# Patient Record
Sex: MALE | Race: WHITE | NOT HISPANIC OR LATINO | URBAN - METROPOLITAN AREA
[De-identification: names, ages, dates, MRNs, and addresses within clinical notes are randomized per-mention and may not be internally consistent; named-entity substitution may affect disease eponyms.]

---

## 2018-02-12 ENCOUNTER — AMBULATORY SURGICAL CENTER (OUTPATIENT)
Dept: URBAN - METROPOLITAN AREA SURGERY 20 | Facility: SURGERY | Age: 68
End: 2018-02-12

## 2018-02-12 ENCOUNTER — OFFICE (OUTPATIENT)
Dept: URBAN - METROPOLITAN AREA PATHOLOGY 4 | Facility: PATHOLOGY | Age: 68
End: 2018-02-12

## 2018-02-12 DIAGNOSIS — D12.2 BENIGN NEOPLASM OF ASCENDING COLON: ICD-10-CM

## 2018-02-12 DIAGNOSIS — D12.0 BENIGN NEOPLASM OF CECUM: ICD-10-CM

## 2018-02-12 DIAGNOSIS — K64.0 FIRST DEGREE HEMORRHOIDS: ICD-10-CM

## 2018-02-12 DIAGNOSIS — D12.6 BENIGN NEOPLASM OF COLON, UNSPECIFIED: ICD-10-CM

## 2018-02-12 DIAGNOSIS — Z86.010 PERSONAL HISTORY OF COLONIC POLYPS: ICD-10-CM

## 2018-02-12 LAB
GI HISTOLOGY: A. UNSPECIFIED: (no result)
GI HISTOLOGY: B. SELECT: (no result)
GI HISTOLOGY: C. SELECT: (no result)
GI HISTOLOGY: PDF REPORT: (no result)

## 2018-02-12 PROCEDURE — 88305 TISSUE EXAM BY PATHOLOGIST: CPT | Performed by: INTERNAL MEDICINE

## 2018-02-12 PROCEDURE — 45380 COLONOSCOPY AND BIOPSY: CPT | Mod: 59 | Performed by: INTERNAL MEDICINE

## 2018-02-12 PROCEDURE — 45385 COLONOSCOPY W/LESION REMOVAL: CPT | Performed by: INTERNAL MEDICINE

## 2021-02-23 ENCOUNTER — TRANSCRIBE ORDERS (OUTPATIENT)
Dept: SLEEP MEDICINE | Facility: HOSPITAL | Age: 71
End: 2021-02-23

## 2021-02-23 DIAGNOSIS — Z01.818 OTHER SPECIFIED PRE-OPERATIVE EXAMINATION: Primary | ICD-10-CM

## 2021-03-03 ENCOUNTER — LAB (OUTPATIENT)
Dept: LAB | Facility: HOSPITAL | Age: 71
End: 2021-03-03

## 2021-03-03 DIAGNOSIS — Z01.818 OTHER SPECIFIED PRE-OPERATIVE EXAMINATION: ICD-10-CM

## 2021-03-03 PROCEDURE — C9803 HOPD COVID-19 SPEC COLLECT: HCPCS

## 2021-03-03 PROCEDURE — U0005 INFEC AGEN DETEC AMPLI PROBE: HCPCS

## 2021-03-03 PROCEDURE — U0004 COV-19 TEST NON-CDC HGH THRU: HCPCS

## 2021-03-04 LAB — SARS-COV-2 RNA RESP QL NAA+PROBE: NOT DETECTED

## 2021-03-05 ENCOUNTER — ON CAMPUS - OUTPATIENT (OUTPATIENT)
Dept: URBAN - METROPOLITAN AREA HOSPITAL 114 | Facility: HOSPITAL | Age: 71
End: 2021-03-05

## 2021-03-05 ENCOUNTER — ANESTHESIA (OUTPATIENT)
Dept: GASTROENTEROLOGY | Facility: HOSPITAL | Age: 71
End: 2021-03-05

## 2021-03-05 ENCOUNTER — HOSPITAL ENCOUNTER (OUTPATIENT)
Facility: HOSPITAL | Age: 71
Setting detail: HOSPITAL OUTPATIENT SURGERY
Discharge: HOME | End: 2021-03-05
Attending: INTERNAL MEDICINE | Admitting: INTERNAL MEDICINE

## 2021-03-05 ENCOUNTER — ANESTHESIA EVENT (OUTPATIENT)
Dept: GASTROENTEROLOGY | Facility: HOSPITAL | Age: 71
End: 2021-03-05

## 2021-03-05 VITALS
HEIGHT: 69 IN | BODY MASS INDEX: 27.99 KG/M2 | RESPIRATION RATE: 16 BRPM | OXYGEN SATURATION: 97 % | HEART RATE: 73 BPM | WEIGHT: 189 LBS | DIASTOLIC BLOOD PRESSURE: 67 MMHG | SYSTOLIC BLOOD PRESSURE: 114 MMHG

## 2021-03-05 DIAGNOSIS — Z86.010 HISTORY OF COLONIC POLYPS: ICD-10-CM

## 2021-03-05 DIAGNOSIS — D12.3 BENIGN NEOPLASM OF TRANSVERSE COLON: ICD-10-CM

## 2021-03-05 DIAGNOSIS — K57.30 DIVERTICULOSIS OF LARGE INTESTINE WITHOUT PERFORATION OR ABS: ICD-10-CM

## 2021-03-05 DIAGNOSIS — K64.0 FIRST DEGREE HEMORRHOIDS: ICD-10-CM

## 2021-03-05 DIAGNOSIS — Z86.010 PERSONAL HISTORY OF COLONIC POLYPS: ICD-10-CM

## 2021-03-05 DIAGNOSIS — D12.2 BENIGN NEOPLASM OF ASCENDING COLON: ICD-10-CM

## 2021-03-05 PROCEDURE — 45385 COLONOSCOPY W/LESION REMOVAL: CPT | Mod: PT | Performed by: INTERNAL MEDICINE

## 2021-03-05 PROCEDURE — 25010000002 PROPOFOL 10 MG/ML EMULSION: Performed by: ANESTHESIOLOGY

## 2021-03-05 PROCEDURE — 45381 COLONOSCOPY SUBMUCOUS NJX: CPT | Mod: PT,59 | Performed by: INTERNAL MEDICINE

## 2021-03-05 PROCEDURE — 45381 COLONOSCOPY SUBMUCOUS NJX: CPT | Mod: 59,PT | Performed by: INTERNAL MEDICINE

## 2021-03-05 PROCEDURE — 88305 TISSUE EXAM BY PATHOLOGIST: CPT | Performed by: INTERNAL MEDICINE

## 2021-03-05 RX ORDER — ALLOPURINOL 100 MG/1
100 TABLET ORAL DAILY
COMMUNITY

## 2021-03-05 RX ORDER — OMEGA-3S/DHA/EPA/FISH OIL/D3 300MG-1000
400 CAPSULE ORAL DAILY
COMMUNITY
End: 2022-11-11

## 2021-03-05 RX ORDER — LIDOCAINE HYDROCHLORIDE 20 MG/ML
INJECTION, SOLUTION INFILTRATION; PERINEURAL AS NEEDED
Status: DISCONTINUED | OUTPATIENT
Start: 2021-03-05 | End: 2021-03-05 | Stop reason: SURG

## 2021-03-05 RX ORDER — AMLODIPINE BESYLATE 5 MG/1
5 TABLET ORAL DAILY
COMMUNITY

## 2021-03-05 RX ORDER — PROPOFOL 10 MG/ML
VIAL (ML) INTRAVENOUS AS NEEDED
Status: DISCONTINUED | OUTPATIENT
Start: 2021-03-05 | End: 2021-03-05 | Stop reason: SURG

## 2021-03-05 RX ORDER — LOSARTAN POTASSIUM 50 MG/1
100 TABLET ORAL DAILY
COMMUNITY
End: 2022-11-11

## 2021-03-05 RX ORDER — TRAVOPROST OPHTHALMIC SOLUTION 0.04 MG/ML
1 SOLUTION OPHTHALMIC EVERY EVENING
COMMUNITY
End: 2023-02-17

## 2021-03-05 RX ORDER — SODIUM CHLORIDE 0.9 % (FLUSH) 0.9 %
10 SYRINGE (ML) INJECTION AS NEEDED
Status: DISCONTINUED | OUTPATIENT
Start: 2021-03-05 | End: 2021-03-05 | Stop reason: HOSPADM

## 2021-03-05 RX ORDER — SODIUM CHLORIDE 0.9 % (FLUSH) 0.9 %
3 SYRINGE (ML) INJECTION EVERY 12 HOURS SCHEDULED
Status: DISCONTINUED | OUTPATIENT
Start: 2021-03-05 | End: 2021-03-05 | Stop reason: HOSPADM

## 2021-03-05 RX ORDER — ATORVASTATIN CALCIUM 20 MG/1
20 TABLET, FILM COATED ORAL DAILY
COMMUNITY

## 2021-03-05 RX ORDER — HYDROCODONE BITARTRATE AND ACETAMINOPHEN 7.5; 325 MG/1; MG/1
1 TABLET ORAL EVERY 6 HOURS PRN
COMMUNITY
End: 2022-08-26 | Stop reason: SDUPTHER

## 2021-03-05 RX ORDER — SODIUM CHLORIDE, SODIUM LACTATE, POTASSIUM CHLORIDE, CALCIUM CHLORIDE 600; 310; 30; 20 MG/100ML; MG/100ML; MG/100ML; MG/100ML
30 INJECTION, SOLUTION INTRAVENOUS CONTINUOUS PRN
Status: DISCONTINUED | OUTPATIENT
Start: 2021-03-05 | End: 2021-03-05 | Stop reason: HOSPADM

## 2021-03-05 RX ORDER — PROPOFOL 10 MG/ML
VIAL (ML) INTRAVENOUS CONTINUOUS PRN
Status: DISCONTINUED | OUTPATIENT
Start: 2021-03-05 | End: 2021-03-05 | Stop reason: SURG

## 2021-03-05 RX ADMIN — PROPOFOL 100 MG: 10 INJECTION, EMULSION INTRAVENOUS at 07:57

## 2021-03-05 RX ADMIN — PROPOFOL 100 MG: 10 INJECTION, EMULSION INTRAVENOUS at 08:28

## 2021-03-05 RX ADMIN — PROPOFOL 100 MCG/KG/MIN: 10 INJECTION, EMULSION INTRAVENOUS at 07:57

## 2021-03-05 RX ADMIN — SODIUM CHLORIDE, POTASSIUM CHLORIDE, SODIUM LACTATE AND CALCIUM CHLORIDE 30 ML/HR: 600; 310; 30; 20 INJECTION, SOLUTION INTRAVENOUS at 07:44

## 2021-03-05 RX ADMIN — LIDOCAINE HYDROCHLORIDE 60 MG: 20 INJECTION, SOLUTION INFILTRATION; PERINEURAL at 07:58

## 2021-03-05 NOTE — H&P
[facility]   HISTORY AND PHYSICAL    Patient Name: Larry Kwon  : 1950  MRN: [id number]  Primary Care Physician: Jf Lassiter MD  Date of admission: 3/5/2021    Subjective   Subjective     Chief Complaint: Personal history of colon polyps     History of Present Illness  The patient presents with no gastrointestinal  Symptoms or issues at this time   Review of Systems   All other systems reviewed and are negative.       Personal History     Past Medical History:   Diagnosis Date   • Elevated cholesterol    • Gout    • Hypertension        Past Surgical History:   Procedure Laterality Date   • APPENDECTOMY     • BACK SURGERY      x3   • COLONOSCOPY     • HERNIA REPAIR         Family History: family history is not on file. Otherwise pertinent FHx was reviewed and not pertinent to current issue.    Social History:  reports that he has never smoked. He has never used smokeless tobacco. He reports current alcohol use. He reports previous drug use.    Home Medications:  HYDROcodone-acetaminophen, allopurinol, amLODIPine, atorvastatin, cholecalciferol, losartan, and travoprost (ANA free)      Allergies:  No Known Allergies    Objective    Objective     Vitals:  Heart Rate:  [73] 73  Resp:  [16] 16  BP: (136)/(87) 136/87    Physical Exam  HENT:      Right Ear: External ear normal.      Left Ear: External ear normal.      Mouth/Throat:      Pharynx: Oropharynx is clear.   Eyes:      Conjunctiva/sclera: Conjunctivae normal.   Cardiovascular:      Rate and Rhythm: Normal rate.   Pulmonary:      Effort: Pulmonary effort is normal.   Abdominal:      General: Abdomen is flat.   Neurological:      General: No focal deficit present.      Mental Status: He is alert.   Psychiatric:         Mood and Affect: Mood normal.         Result Review    Result Review:  I have personally reviewed the results from the time of this admission to 21 7:55 AM EST and agree with these findings:  []  Laboratory  []   Microbiology  []  Radiology  []  EKG/Telemetry   []  Cardiology/Vascular   [x]  Pathology  [x]  Old records  []  Other:      Assessment/Plan   Assessment / Plan     Brief Patient Summary:  Larry Kwon is a 70 y.o. male who has Personal history of colon polyps   Active Hospital Problems:  There are no active hospital problems to display for this patient.      Plan: Colonoscopy risks, alternatives and benefits discussed with patient and the patient is agreeable to having procedure done.      CODE STATUS:    There are no questions and answers to display.       Electronically signed by Pavan Sauceda MD, 03/05/21, 7:55 AM EST.

## 2021-03-05 NOTE — DISCHARGE INSTRUCTIONS
For the next 24 hours patient needs to be with a responsible adult.    For 24 hours DO NOT drive, operate machinery, appliances, drink alcohol, make important decisions or sign legal documents.    Start with a light or bland diet if you are feeling sick to your stomach otherwise advance to regular diet as tolerated.    Follow recommendations on procedure report if provided by your doctor.    Call Dr. Sauceda for problems [phone]    Problems may include but not limited to: large amounts of bleeding, trouble breathing, repeated vomiting, severe unrelieved pain, fever or chills.

## 2021-03-05 NOTE — ANESTHESIA POSTPROCEDURE EVALUATION
"Patient: Larry Kwon    Procedure Summary     Date: 03/05/21 Room / Location:  [facility]    Anesthesia Start: 0753 Anesthesia Stop: 0845    Procedure: COLONOSCOPY TO CECUM AND TERM ILEUM WITH SALINE LIFT AND HOT SNARE POLYPECTOMIES (N/A ) Diagnosis:     Surgeon: Pavan Sauceda MD Provider: Jayjay Perez MD    Anesthesia Type: MAC ASA Status: 2          Anesthesia Type: MAC    Vitals  Vitals Value Taken Time   /67 03/05/21 0905   Temp     Pulse 73 03/05/21 0905   Resp 16 03/05/21 0905   SpO2 97 % 03/05/21 0905           Post Anesthesia Care and Evaluation    Patient location during evaluation: PACU  Patient participation: complete - patient participated  Level of consciousness: awake  Pain score: 0  Pain management: adequate  Airway patency: patent  Anesthetic complications: No anesthetic complications  PONV Status: none  Cardiovascular status: acceptable  Respiratory status: acceptable  Hydration status: acceptable    Comments: /67   Pulse 73   Resp 16   Ht 175.3 cm (69\")   Wt 85.7 kg (189 lb)   SpO2 97%   BMI 27.91 kg/m²       "

## 2021-03-05 NOTE — ANESTHESIA PREPROCEDURE EVALUATION
Anesthesia Evaluation     Patient summary reviewed and Nursing notes reviewed   NPO Solid Status: > 8 hours  NPO Liquid Status: > 2 hours           Airway   Mallampati: I  TM distance: >3 FB  Neck ROM: full  No difficulty expected  Dental - normal exam     Pulmonary - negative pulmonary ROS and normal exam   Cardiovascular - normal exam    (+) hypertension, hyperlipidemia,     ROS comment: rbbb    Neuro/Psych- negative ROS  GI/Hepatic/Renal/Endo - negative ROS     Musculoskeletal (-) negative ROS    Abdominal  - normal exam    Bowel sounds: normal.   Substance History - negative use     OB/GYN negative ob/gyn ROS         Other                      Anesthesia Plan    ASA 2     MAC       Anesthetic plan, all risks, benefits, and alternatives have been provided, discussed and informed consent has been obtained with: patient.

## 2021-03-08 LAB
LAB AP CASE REPORT: NORMAL
PATH REPORT.FINAL DX SPEC: NORMAL
PATH REPORT.GROSS SPEC: NORMAL

## 2022-08-05 ENCOUNTER — OFFICE VISIT (OUTPATIENT)
Dept: FAMILY MEDICINE CLINIC | Facility: CLINIC | Age: 72
End: 2022-08-05

## 2022-08-05 VITALS
DIASTOLIC BLOOD PRESSURE: 84 MMHG | HEART RATE: 76 BPM | SYSTOLIC BLOOD PRESSURE: 128 MMHG | OXYGEN SATURATION: 98 % | RESPIRATION RATE: 12 BRPM | BODY MASS INDEX: 28.85 KG/M2 | HEIGHT: 68 IN | TEMPERATURE: 98.4 F | WEIGHT: 190.4 LBS

## 2022-08-05 DIAGNOSIS — M54.41 CHRONIC RIGHT-SIDED LOW BACK PAIN WITH RIGHT-SIDED SCIATICA: ICD-10-CM

## 2022-08-05 DIAGNOSIS — I10 PRIMARY HYPERTENSION: ICD-10-CM

## 2022-08-05 DIAGNOSIS — M54.42 CHRONIC BILATERAL LOW BACK PAIN WITH LEFT-SIDED SCIATICA: ICD-10-CM

## 2022-08-05 DIAGNOSIS — E79.0 HYPERURICEMIA: ICD-10-CM

## 2022-08-05 DIAGNOSIS — E55.9 VITAMIN D INSUFFICIENCY: ICD-10-CM

## 2022-08-05 DIAGNOSIS — Z00.00 ROUTINE GENERAL MEDICAL EXAMINATION AT A HEALTH CARE FACILITY: Primary | ICD-10-CM

## 2022-08-05 DIAGNOSIS — G89.29 CHRONIC BILATERAL LOW BACK PAIN WITH LEFT-SIDED SCIATICA: ICD-10-CM

## 2022-08-05 DIAGNOSIS — E78.2 MIXED HYPERLIPIDEMIA: ICD-10-CM

## 2022-08-05 DIAGNOSIS — M25.561 CHRONIC PAIN OF RIGHT KNEE: ICD-10-CM

## 2022-08-05 DIAGNOSIS — G89.29 CHRONIC RIGHT-SIDED LOW BACK PAIN WITH RIGHT-SIDED SCIATICA: ICD-10-CM

## 2022-08-05 DIAGNOSIS — E79.0 HYPERURICEMIA WITHOUT SIGNS OF INFLAMMATORY ARTHRITIS AND TOPHACEOUS DISEASE: ICD-10-CM

## 2022-08-05 DIAGNOSIS — G89.29 CHRONIC PAIN OF RIGHT KNEE: ICD-10-CM

## 2022-08-05 PROBLEM — M47.812 ARTHROPATHY OF CERVICAL SPINE: Status: ACTIVE | Noted: 2022-08-05

## 2022-08-05 PROBLEM — N40.0 BPH (BENIGN PROSTATIC HYPERPLASIA): Status: ACTIVE | Noted: 2022-08-05

## 2022-08-05 PROBLEM — R45.4 IRRITABILITY: Status: ACTIVE | Noted: 2022-08-05

## 2022-08-05 PROBLEM — H40.89: Status: ACTIVE | Noted: 2022-08-05

## 2022-08-05 PROBLEM — K57.90 DIVERTICULOSIS: Status: ACTIVE | Noted: 2022-08-05

## 2022-08-05 PROBLEM — G57.12 MERALGIA PARESTHETICA OF LEFT SIDE: Status: ACTIVE | Noted: 2022-08-05

## 2022-08-05 PROBLEM — I45.10 RBBB: Status: ACTIVE | Noted: 2022-08-05

## 2022-08-05 PROCEDURE — 90715 TDAP VACCINE 7 YRS/> IM: CPT | Performed by: INTERNAL MEDICINE

## 2022-08-05 PROCEDURE — 90471 IMMUNIZATION ADMIN: CPT | Performed by: INTERNAL MEDICINE

## 2022-08-05 PROCEDURE — 36415 COLL VENOUS BLD VENIPUNCTURE: CPT | Performed by: INTERNAL MEDICINE

## 2022-08-05 PROCEDURE — 1170F FXNL STATUS ASSESSED: CPT | Performed by: INTERNAL MEDICINE

## 2022-08-05 PROCEDURE — 93000 ELECTROCARDIOGRAM COMPLETE: CPT | Performed by: INTERNAL MEDICINE

## 2022-08-05 PROCEDURE — 1160F RVW MEDS BY RX/DR IN RCRD: CPT | Performed by: INTERNAL MEDICINE

## 2022-08-05 PROCEDURE — G0439 PPPS, SUBSEQ VISIT: HCPCS | Performed by: INTERNAL MEDICINE

## 2022-08-05 RX ORDER — LOSARTAN POTASSIUM 100 MG/1
TABLET ORAL
COMMUNITY
Start: 2022-07-05

## 2022-08-05 RX ORDER — MELATONIN
COMMUNITY
Start: 2022-07-05

## 2022-08-05 RX ORDER — MONTELUKAST SODIUM 10 MG/1
TABLET ORAL
COMMUNITY
Start: 2022-06-07 | End: 2023-02-17

## 2022-08-05 RX ORDER — ATORVASTATIN CALCIUM 20 MG/1
20 TABLET, FILM COATED ORAL DAILY
COMMUNITY
End: 2022-11-11

## 2022-08-05 RX ORDER — LATANOPROST 50 UG/ML
SOLUTION/ DROPS OPHTHALMIC
COMMUNITY
Start: 2022-07-28

## 2022-08-05 RX ORDER — OMEGA-3S/DHA/EPA/FISH OIL/D3 300MG-1000
400 CAPSULE ORAL
COMMUNITY
End: 2022-11-11

## 2022-08-05 RX ORDER — ALLOPURINOL 100 MG/1
100 TABLET ORAL DAILY
COMMUNITY
End: 2022-11-11

## 2022-08-05 RX ORDER — TRAVOPROST OPHTHALMIC SOLUTION 0.04 MG/ML
1 SOLUTION OPHTHALMIC DAILY
COMMUNITY
End: 2022-11-11

## 2022-08-05 NOTE — PROGRESS NOTES
Male Physical Note      Date: 2022   Patient Name: Larry Kwon  : 1950   MRN: [id number]     Chief Complaint:    Chief Complaint   Patient presents with   • Annual Exam       History of Present Illness: Larry Kwon is a 72 y.o. male who is here today for their subsequent Medicare annual wellness visit and physical.  Patient has a history of chronic back pain with primarily left greater than right-sided sciatica, overall doing quite well of late, taking his regimen of Norco 7.5 mg prescribed twice daily, typically taken most days.  No side effects of medication and does get good relief in pain.  He always has had appropriate urine drug screens with no concern admits appropriate use.  Hypertension well controlled.  No cardiopulmonary complaints.  Has noted some right knee pain for the last 6 months, no swelling, no locking, just more uncomfortable at times.  Not a major problem however.  No history of related trauma.  He also noted that he had lifted some mulch several weeks ago and had some acute lower abdominal discomfort secondarily which has now resolved.  He just wanted to make sure he did not have a hernia.    No hospitalizations in the last 365 days.    No falls unsteadiness or concerns of falling the last 365 days, no functional limitations, no mood concerns    Patient has advanced care directive      Subjective      Review of Systems:   Review of Systems    Past Medical History, Social History, Family History and Care Team were all reviewed with patient and updated as appropriate.     Medications:     Current Outpatient Medications:   •  allopurinol (ZYLOPRIM) 100 MG tablet, Take 100 mg by mouth Daily., Disp: , Rfl:   •  amLODIPine (NORVASC) 5 MG tablet, Take 5 mg by mouth Daily., Disp: , Rfl:   •  atorvastatin (LIPITOR) 20 MG tablet, Take 20 mg by mouth Daily., Disp: , Rfl:   •  cholecalciferol (VITAMIN D3) 10 MCG (400 UNIT) tablet, Take 400 Units by mouth Daily., Disp: , Rfl:   •   HYDROcodone-acetaminophen (NORCO) 7.5-325 MG per tablet, Take 1 tablet by mouth Every 6 (Six) Hours As Needed for Moderate Pain ., Disp: , Rfl:   •  losartan (COZAAR) 50 MG tablet, Take 100 mg by mouth Daily., Disp: , Rfl:   •  travoprost, BAK free, (TRAVATAN) 0.004 % solution ophthalmic solution, 1 drop Every Evening. in affected eye(s), Disp: , Rfl:   •  allopurinol (ZYLOPRIM) 100 MG tablet, Take 100 mg by mouth Daily., Disp: , Rfl:   •  atorvastatin (LIPITOR) 20 MG tablet, Take 20 mg by mouth Daily., Disp: , Rfl:   •  cholecalciferol (VITAMIN D3) 10 MCG (400 UNIT) tablet, Take 400 Units by mouth., Disp: , Rfl:   •  cholecalciferol (VITAMIN D3) 25 MCG (1000 UT) tablet, , Disp: , Rfl:   •  latanoprost (XALATAN) 0.005 % ophthalmic solution, , Disp: , Rfl:   •  losartan (COZAAR) 100 MG tablet, , Disp: , Rfl:   •  montelukast (SINGULAIR) 10 MG tablet, , Disp: , Rfl:   •  travoprost, BAK free, (TRAVATAN) 0.004 % solution ophthalmic solution, Inject 1 drop into the eye Daily., Disp: , Rfl:     Allergies:   Allergies   Allergen Reactions   • Ace Inhibitors Cough       Immunization History   Administered Date(s) Administered   • COVID-19 (PFIZER) PURPLE CAP 03/07/2021, 03/28/2021, 10/20/2021   • Fluzone High Dose =>65 Years ([facility] ONLY) 10/02/2018, 10/03/2019, 10/06/2020, 10/29/2021   • Influenza, Unspecified 10/29/2021   • Pneumococcal Conjugate 13-Valent (PCV13) 06/21/2016   • Pneumococcal Polysaccharide (PPSV23) 03/24/2014, 11/25/2014   • Pneumococcal, Unspecified 11/25/2014   • Tdap 09/20/2012, 08/05/2022        Last Completed Colonoscopy          COLORECTAL CANCER SCREENING (COLONOSCOPY - Every 10 Years) Next due on 3/5/2031    03/05/2021  COLONOSCOPY    03/05/2021  Surgical Procedure: COLONOSCOPY                      CT for Smoker (Age 50-80, 20 pk yr): N/A    Hep C (Age 18-79 once): Pending today  HIV (Age 15-65 once): N/A  A1c: Pending today  Lipid panel: Pending today  The 10-year ASCVD risk score (Shree DC  " et al., 2013) is: 24.5%    Values used to calculate the score:      Age: 72 years      Sex: Male      Is Non- : No      Diabetic: No      Tobacco smoker: No      Systolic Blood Pressure: 128 mmHg      Is BP treated: Yes      HDL Cholesterol: 57 mg/dL      Total Cholesterol: 175 mg/dL    Dermatology: N/A  Ophthalmologist: Regular checkup  Dentist: Regular checkup    Tobacco Use: Low Risk    • Smoking Tobacco Use: Never Smoker   • Smokeless Tobacco Use: Never Used       Social History     Substance and Sexual Activity   Alcohol Use Yes    Comment: rarely        Social History     Substance and Sexual Activity   Drug Use Not Currently        Diet/Physical activity: Regular exercise, healthy diet    Depression: PHQ-2 Depression Screening  PHQ-9 Total Score: 0       Objective     Physical Exam:  Vital Signs:   Vitals:    08/05/22 1004   BP: 128/84   Pulse: 76   Resp: 12   Temp: 98.4 °F (36.9 °C)   SpO2: 98%   Weight: 86.4 kg (190 lb 6.4 oz)   Height: 172.7 cm (68\")     Body mass index is 28.95 kg/m².     General Appearance:    Alert, cooperative, no distress, appears stated age   Head:    Normocephalic, without obvious abnormality, atraumatic   Eyes:    PERRL, conjunctiva/corneas clear, EOM's intact, fundi     benign, both eyes   Ears:    Normal TM's and external ear canals, both ears   Nose:   Nares normal, septum midline, mucosa normal, no drainage    or sinus tenderness   Throat:   Lips, mucosa, and tongue normal; teeth and gums normal   Neck:   Supple, symmetrical, trachea midline, no adenopathy;     thyroid:  no enlargement/tenderness/nodules; no carotid    bruit or JVD   Back:     Symmetric, no curvature, ROM normal, no CVA tenderness   Lungs:     Clear to auscultation bilaterally, respirations unlabored   Chest Wall:    No tenderness or deformity    Heart:    Regular rate and rhythm, S1 and S2 normal, no murmur, rub   or gallop       Abdomen:     Soft, non-tender, bowel sounds active " all four quadrants,     no masses, no organomegaly   Genitalia:    Normal male without lesion, discharge or tenderness   Rectal:     normal sphincter tone, prostate moderately enlarged smooth and nontender with no focal nodules   Musculoskeletal:  Stream is significant only for some very mild vague right knee discomfort to full flexion extension with negative Rosy sign, no effusions, no pain to strain the medial or lateral collateral ligament, negative anterior and posterior drawer sign, otherwise unremarkable, atraumatic, no cyanosis or edema   Pulses:   2+ and symmetric all extremities   Skin:   Skin color, texture, turgor normal, no rashes or lesions   Lymph nodes:   Cervical, supraclavicular, and axillary nodes normal   Neurologic:   CNII-XII intact, normal strength, slight numbness on the left anterolateral thigh, 0-1 left knee jerk, 2+ right knee jerk, 2+ ankle jerks bilaterally, normal strength in lower extremities, normal gait         ECG 12 Lead    Date/Time: 8/5/2022 11:23 AM  Performed by: Jf Lassiter MD  Authorized by: Jf Lassiter MD   Comparison: compared with previous ECG from 7/29/2021  Comparison to previous ECG: Normal sinus rhythm rate 71 with right bundle branch block, no acute ischemia, no change versus prior tracing              Assessment / Plan      Assessment/Plan:   Diagnoses and all orders for this visit:    1. Routine general medical examination at a health care facility (Primary)  -     TSH Rfx On Abnormal To Free T4; Future  -     Hepatitis C Antibody; Future  Patient overall doing very well.  Check screening labs.    2. Chronic bilateral low back pain with left-sided sciatica  -     Urine Drug Screen - Urine, Clean Catch; Future  .  Doing well on his chronic pain medication regimen with no complications or concerns.  Check screening UDS.  [place] screen appropriate.    3. Chronic right-sided low back pain with right-sided sciatica  -     Urine Drug Screen - Urine, Clean  Catch; Future  Doing well again as noted above.    4. Mixed hyperlipidemia  -     Comprehensive Metabolic Panel; Future  -     Lipid Panel; Future  -     Hemoglobin A1c; Future  Update lipid profile.  Continues his atorvastatin.    5. Primary hypertension  -     Urinalysis With Culture If Indicated -; Future  -     Comprehensive Metabolic Panel; Future  -     Lipid Panel; Future  -     Hemoglobin A1c; Future  .  Doing well.  Continue current regimen.  Continue monitoring.  Ideal parameters discussed.  EKG stable with a right bundle branch block, no change over the last year  6. Chronic pain of right knee  .  Not a significant problem at this time.  7. Vitamin D insufficiency  -     Vitamin D 25 Hydroxy; Future  Update lab testing.  8. Hyperuricemia  -     Uric Acid; Future  On allopurinol prophylaxis.  Update uric acid.  9. 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  BMI is >= 25 and <30. (Overweight) The following options were offered after discussion;: exercise counseling/recommendations and nutrition counseling/recommendations    Larry Kwon voices understanding and acceptance of this advice and will call back with any further questions or concerns. AVS with preventive healthcare tips printed for patient.     Follow Up:   Return in about 3 months (around 11/5/2022) for Recheck, Labs today.    I have spent a total of 45 min on reviewing test results/preparing to see patient, counseling patient, performing medically appropriate exam and documenting clinical information in the electronic or other health record.     At [facility], we believe that sharing information builds trust and better relationships. You are receiving this note because you recently visited [facility]. It is possible you will see health information before a provider has talked  with you about it. This kind of information can be easy to misunderstand. To help you fully understand what it means for your health, we urge you to discuss this note with your provider.    Jf Lassiter MD  [facility]

## 2022-08-06 LAB
25(OH)D3+25(OH)D2 SERPL-MCNC: 36.9 NG/ML (ref 30–100)
ALBUMIN SERPL-MCNC: 4.8 G/DL (ref 3.7–4.7)
ALBUMIN/GLOB SERPL: 1.9 {RATIO} (ref 1.2–2.2)
ALP SERPL-CCNC: 132 IU/L (ref 44–121)
ALT SERPL-CCNC: 22 IU/L (ref 0–44)
AMPHETAMINES UR QL SCN: NEGATIVE NG/ML
APPEARANCE UR: CLEAR
AST SERPL-CCNC: 23 IU/L (ref 0–40)
BACTERIA #/AREA URNS HPF: NORMAL /[HPF]
BARBITURATES UR QL SCN: NEGATIVE NG/ML
BENZODIAZ UR QL SCN: NEGATIVE NG/ML
BILIRUB SERPL-MCNC: 0.9 MG/DL (ref 0–1.2)
BILIRUB UR QL STRIP: NEGATIVE
BUN SERPL-MCNC: 15 MG/DL (ref 8–27)
BUN/CREAT SERPL: 13 (ref 10–24)
BZE UR QL SCN: NEGATIVE NG/ML
CALCIUM SERPL-MCNC: 10.7 MG/DL (ref 8.6–10.2)
CANNABINOIDS UR QL SCN: NEGATIVE NG/ML
CASTS URNS QL MICRO: NORMAL /LPF
CHLORIDE SERPL-SCNC: 104 MMOL/L (ref 96–106)
CHOLEST SERPL-MCNC: 161 MG/DL (ref 100–199)
CO2 SERPL-SCNC: 22 MMOL/L (ref 20–29)
COLOR UR: YELLOW
CREAT SERPL-MCNC: 1.14 MG/DL (ref 0.76–1.27)
CREAT UR-MCNC: 101 MG/DL (ref 20–300)
EGFRCR SERPLBLD CKD-EPI 2021: 67 ML/MIN/1.73
EPI CELLS #/AREA URNS HPF: NORMAL /HPF (ref 0–10)
GLOBULIN SER CALC-MCNC: 2.5 G/DL (ref 1.5–4.5)
GLUCOSE SERPL-MCNC: ABNORMAL MG/DL
GLUCOSE UR QL STRIP: NEGATIVE
HBA1C MFR BLD: 5.7 % (ref 4.8–5.6)
HCV AB S/CO SERPL IA: <0.1 S/CO RATIO (ref 0–0.9)
HDLC SERPL-MCNC: 58 MG/DL
HGB UR QL STRIP: NEGATIVE
KETONES UR QL STRIP: NEGATIVE
LABORATORY COMMENT REPORT: ABNORMAL
LDLC SERPL CALC-MCNC: 85 MG/DL (ref 0–99)
LEUKOCYTE ESTERASE UR QL STRIP: NEGATIVE
METHADONE UR QL SCN: NEGATIVE NG/ML
MICRO URNS: NORMAL
MICRO URNS: NORMAL
NITRITE UR QL STRIP: NEGATIVE
OPIATES UR QL SCN: POSITIVE NG/ML
OXYCODONE+OXYMORPHONE UR QL SCN: NEGATIVE NG/ML
PCP UR QL: NEGATIVE NG/ML
PH UR STRIP: 5.5 [PH] (ref 5–7.5)
PH UR: 5.7 [PH] (ref 4.5–8.9)
POTASSIUM SERPL-SCNC: ABNORMAL MMOL/L
PROPOXYPH UR QL SCN: NEGATIVE NG/ML
PROT SERPL-MCNC: 7.3 G/DL (ref 6–8.5)
PROT UR QL STRIP: NEGATIVE
RBC #/AREA URNS HPF: NORMAL /HPF (ref 0–2)
SODIUM SERPL-SCNC: 141 MMOL/L (ref 134–144)
SP GR UR STRIP: 1.01 (ref 1–1.03)
TRIGL SERPL-MCNC: 97 MG/DL (ref 0–149)
TSH SERPL DL<=0.005 MIU/L-ACNC: 2.38 UIU/ML (ref 0.45–4.5)
URATE SERPL-MCNC: 4.9 MG/DL (ref 3.8–8.4)
URINALYSIS REFLEX: NORMAL
UROBILINOGEN UR STRIP-MCNC: 0.2 MG/DL (ref 0.2–1)
VLDLC SERPL CALC-MCNC: 18 MG/DL (ref 5–40)
WBC #/AREA URNS HPF: NORMAL /HPF (ref 0–5)

## 2022-08-08 ENCOUNTER — TELEPHONE (OUTPATIENT)
Dept: FAMILY MEDICINE CLINIC | Facility: CLINIC | Age: 72
End: 2022-08-08

## 2022-08-08 NOTE — TELEPHONE ENCOUNTER
----- Message from Jf Lassiter MD sent at 8/8/2022 10:49 AM EDT -----  Please contact patient advising him that I have reviewed lab testing for today, all of which were very satisfactory other than a very borderline elevated calcium level, and borderline hemoglobin A1c of 5.7%.  Patient will be advised to initiate regular exercise and monitor his diet closely.  We will repeat a hemoglobin A1c at his follow-up visit in 3 months and also add an ionized calcium and intact PTH at that time.

## 2022-08-26 DIAGNOSIS — M54.31 BACK PAIN WITH RIGHT-SIDED SCIATICA: Primary | ICD-10-CM

## 2022-08-26 RX ORDER — COVID-19 MOLECULAR TEST ASSAY
KIT MISCELLANEOUS
COMMUNITY
Start: 2022-08-13

## 2022-08-26 RX ORDER — HYDROCODONE BITARTRATE AND ACETAMINOPHEN 7.5; 325 MG/1; MG/1
1 TABLET ORAL 2 TIMES DAILY
Qty: 60 TABLET | Refills: 0 | Status: SHIPPED | OUTPATIENT
Start: 2022-08-26 | End: 2022-09-28 | Stop reason: SDUPTHER

## 2022-09-28 DIAGNOSIS — M54.31 BACK PAIN WITH RIGHT-SIDED SCIATICA: ICD-10-CM

## 2022-09-28 RX ORDER — HYDROCODONE BITARTRATE AND ACETAMINOPHEN 7.5; 325 MG/1; MG/1
1 TABLET ORAL 2 TIMES DAILY
Qty: 60 TABLET | Refills: 0 | Status: SHIPPED | OUTPATIENT
Start: 2022-09-28 | End: 2022-10-27 | Stop reason: SDUPTHER

## 2022-09-28 NOTE — TELEPHONE ENCOUNTER
His wife has called asking if we would fill his pain medication. I have sent the rx to Dr. Rhoades to be filled. TF

## 2022-10-27 DIAGNOSIS — M54.31 BACK PAIN WITH RIGHT-SIDED SCIATICA: ICD-10-CM

## 2022-10-27 RX ORDER — HYDROCODONE BITARTRATE AND ACETAMINOPHEN 7.5; 325 MG/1; MG/1
1 TABLET ORAL 2 TIMES DAILY
Qty: 60 TABLET | Refills: 0 | Status: SHIPPED | OUTPATIENT
Start: 2022-10-27 | End: 2022-11-28 | Stop reason: SDUPTHER

## 2022-11-11 ENCOUNTER — OFFICE VISIT (OUTPATIENT)
Dept: FAMILY MEDICINE CLINIC | Facility: CLINIC | Age: 72
End: 2022-11-11

## 2022-11-11 VITALS
OXYGEN SATURATION: 96 % | SYSTOLIC BLOOD PRESSURE: 120 MMHG | BODY MASS INDEX: 29.49 KG/M2 | DIASTOLIC BLOOD PRESSURE: 78 MMHG | HEIGHT: 68 IN | WEIGHT: 194.6 LBS | TEMPERATURE: 98.2 F | HEART RATE: 73 BPM

## 2022-11-11 DIAGNOSIS — R73.03 PREDIABETES: ICD-10-CM

## 2022-11-11 DIAGNOSIS — E83.52 HYPERCALCEMIA: ICD-10-CM

## 2022-11-11 DIAGNOSIS — Z23 NEED FOR PROPHYLACTIC VACCINATION AND INOCULATION AGAINST INFLUENZA: ICD-10-CM

## 2022-11-11 DIAGNOSIS — I10 BENIGN HYPERTENSION: ICD-10-CM

## 2022-11-11 DIAGNOSIS — G89.29 CHRONIC BILATERAL LOW BACK PAIN WITH LEFT-SIDED SCIATICA: Primary | ICD-10-CM

## 2022-11-11 DIAGNOSIS — M54.42 CHRONIC BILATERAL LOW BACK PAIN WITH LEFT-SIDED SCIATICA: Primary | ICD-10-CM

## 2022-11-11 PROCEDURE — G0008 ADMIN INFLUENZA VIRUS VAC: HCPCS | Performed by: INTERNAL MEDICINE

## 2022-11-11 PROCEDURE — 90662 IIV NO PRSV INCREASED AG IM: CPT | Performed by: INTERNAL MEDICINE

## 2022-11-11 PROCEDURE — 36415 COLL VENOUS BLD VENIPUNCTURE: CPT | Performed by: INTERNAL MEDICINE

## 2022-11-11 PROCEDURE — 99214 OFFICE O/P EST MOD 30 MIN: CPT | Performed by: INTERNAL MEDICINE

## 2022-11-11 RX ORDER — CEPHALEXIN 500 MG/1
500 CAPSULE ORAL EVERY 12 HOURS
COMMUNITY
Start: 2022-11-08 | End: 2022-11-11

## 2022-11-11 RX ORDER — TAMSULOSIN HYDROCHLORIDE 0.4 MG/1
1 CAPSULE ORAL DAILY
COMMUNITY
Start: 2022-11-08

## 2022-11-11 NOTE — PROGRESS NOTES
Follow Up Office Visit      Date: 2022   Patient Name: Larry Kwon  : 1950   MRN: [id number]     Chief Complaint:    Chief Complaint   Patient presents with   • Follow-up       History of Present Illness: Larry Kwon is a 72 y.o. male who is here today for his chronic back pain with intermittent sciatica, more typically on the left, the sciatica actually little bit better, taking Norco 7.5 mg prescribed twice daily, sometimes taking more sporadically but overall averaging 60 tablets monthly.  No side effects of the Norco and he does feel it improves his quality life.  He will also supplement with some ibuprofen.  History of hypertension with blood pressures averaging 120-130 over the 70s checked once or twice weekly with no chest pains palpitations dyspnea dizziness or edema.  He has recently been diagnosed with hemoglobin A1c of 5.7% consistent with borderline prediabetes.  Also had some hypercalcemia noted on last lab testing and is here to have some follow-up testing including intact PTH.  Since I last saw him he also underwent lithotripsy of a large kidney stone on the right by his urologist in [place].  Not having any side effects from this.  He requests flu vaccine today.  Indicates he has had recently COVID booster but it was not the new bivalent booster.    Subjective      Review of Systems:   Review of Systems    I have reviewed the patients family history, social history, past medical history, past surgical history and have updated it as appropriate.     Medications:     Current Outpatient Medications:   •  allopurinol (ZYLOPRIM) 100 MG tablet, Take 100 mg by mouth Daily., Disp: , Rfl:   •  amLODIPine (NORVASC) 5 MG tablet, Take 5 mg by mouth Daily., Disp: , Rfl:   •  atorvastatin (LIPITOR) 20 MG tablet, Take 20 mg by mouth Daily., Disp: , Rfl:   •  BinaxNOW COVID-19 Ag Home Test kit, TEST AS DIRECTED TODAY, Disp: , Rfl:   •  cholecalciferol (VITAMIN D3) 25 MCG (1000 UT) tablet, , Disp:  ", Rfl:   •  HYDROcodone-acetaminophen (NORCO) 7.5-325 MG per tablet, Take 1 tablet by mouth 2 (Two) Times a Day., Disp: 60 tablet, Rfl: 0  •  latanoprost (XALATAN) 0.005 % ophthalmic solution, , Disp: , Rfl:   •  losartan (COZAAR) 100 MG tablet, , Disp: , Rfl:   •  montelukast (SINGULAIR) 10 MG tablet, , Disp: , Rfl:   •  travoprost, ANA free, (TRAVATAN) 0.004 % solution ophthalmic solution, 1 drop Every Evening. in affected eye(s), Disp: , Rfl:   •  tamsulosin (FLOMAX) 0.4 MG capsule 24 hr capsule, Take 1 capsule by mouth Daily., Disp: , Rfl:     Allergies:   Allergies   Allergen Reactions   • Ace Inhibitors Cough       Objective     Physical Exam: Please see above  Vital Signs:   Vitals:    11/11/22 1452   BP: 120/78   BP Location: Left arm   Patient Position: Sitting   Cuff Size: Adult   Pulse: 73   Temp: 98.2 °F (36.8 °C)   TempSrc: Temporal   SpO2: 96%   Weight: 88.3 kg (194 lb 9.6 oz)   Height: 172.7 cm (68\")     Body mass index is 29.59 kg/m².  BMI is >= 25 and <30. (Overweight) The following options were offered after discussion;: weight loss educational material (shared in after visit summary), exercise counseling/recommendations and nutrition counseling/recommendations       Physical Exam  General: Very pleasant well spoken 72-year-old white male who is in no acute distress, BMI of 29.5.  Lungs clear  Cardiac regular rate rhythm with no murmurs gallops or rubs with no dependent edema  Musculoskeletal exam with minimal tenderness palpation across the beltline in the lumbar region, straight leg raise testing in a seated position mildly positive on the left causing some left posterior thigh pain, negative on the right, reflex testing 0-1+ left knee jerk, 2+ right knee jerk, 2+ bilateral ankle jerks  Bipedal exam with 2+ pulses, normal sensation both feet to fine touch vibration and pinprick with motor exam normal, no edema  Diabetic Foot Exam Performed and Monofilament Test Performed  Procedures    Results: "   Labs:   Hemoglobin A1C   Date Value Ref Range Status   08/05/2022 5.7 (H) 4.8 - 5.6 % Final     Comment:              Prediabetes: 5.7 - 6.4           Diabetes: >6.4           Glycemic control for adults with diabetes: <7.0     11/04/2021 5.4 4.3 - 5.6 % Final     Comment:     (note)  A1C% Reference Range:  4.3 - 5.6  Normal range  5.7 - 6.4  Pre-diabetic -increased risk for developing diabetes  mellitus.  >=6.5      Diabetic -diagnostic of diabetes mellitus.    Note: For diagnosis of diabetes in individuals without unequivocal  hyperglycemia, results should be confirmed by repeat testing.  Patients with conditions that shorten erythrocyte survival, such as  recovery from acute blood loss, hemolytic anemia, kidney disease,  or the presence of unstable hemogloblins like HbSS, HbCC, and HbSC  may yield falsely decreased HbA1c test results. Iron deficiency may  yield falsely increased HbA1c test results.     TSH   Date Value Ref Range Status   08/05/2022 2.380 0.450 - 4.500 uIU/mL Final   10/30/2020 1.460 0.470 - 4.680 u(iU)/mL Final     Comment:     (note)  Higher dose biotin supplements may interfere with this test.  Interpret results within the context of patient's clinical picture.        POCT Results (if applicable):   Results for orders placed or performed in visit on 08/05/22   Hepatitis C Antibody    Specimen: Blood    Blood  Release to bruno   Result Value Ref Range    Hep C Virus Ab <0.1 0.0 - 0.9 s/co ratio   Hemoglobin A1c    Specimen: Blood    Blood  Release to bruno   Result Value Ref Range    Hemoglobin A1C 5.7 (H) 4.8 - 5.6 %   Lipid Panel    Specimen: Blood    Blood  Release to bruno   Result Value Ref Range    Total Cholesterol 161 100 - 199 mg/dL    Triglycerides 97 0 - 149 mg/dL    HDL Cholesterol 58 >39 mg/dL    VLDL Cholesterol Dhruv 18 5 - 40 mg/dL    LDL Chol Calc (NIH) 85 0 - 99 mg/dL   Comprehensive Metabolic Panel    Specimen: Blood    Blood  Release to bruno   Result Value Ref Range    Glucose  CANCELED mg/dL    BUN 15 8 - 27 mg/dL    Creatinine 1.14 0.76 - 1.27 mg/dL    EGFR Result 67 >59 mL/min/1.73    BUN/Creatinine Ratio 13 10 - 24    Sodium 141 134 - 144 mmol/L    Potassium CANCELED mmol/L    Chloride 104 96 - 106 mmol/L    Total CO2 22 20 - 29 mmol/L    Calcium 10.7 (H) 8.6 - 10.2 mg/dL    Total Protein 7.3 6.0 - 8.5 g/dL    Albumin 4.8 (H) 3.7 - 4.7 g/dL    Globulin 2.5 1.5 - 4.5 g/dL    A/G Ratio 1.9 1.2 - 2.2    Total Bilirubin 0.9 0.0 - 1.2 mg/dL    Alkaline Phosphatase 132 (H) 44 - 121 IU/L    AST (SGOT) 23 0 - 40 IU/L    ALT (SGPT) 22 0 - 44 IU/L   TSH Rfx On Abnormal To Free T4    Specimen: Blood    Blood  Release to bruno   Result Value Ref Range    TSH 2.380 0.450 - 4.500 uIU/mL   Uric Acid    Specimen: Blood    Blood  Release to bruno   Result Value Ref Range    Uric Acid 4.9 3.8 - 8.4 mg/dL   Urinalysis With Culture If Indicated -    Specimen: Blood    Blood  Release to bruno   Result Value Ref Range    Specific Gravity, UA 1.012 1.005 - 1.030    pH, UA 5.5 5.0 - 7.5    Color, UA Yellow Yellow    Appearance, UA Clear Clear    Leukocytes, UA Negative Negative    Protein Negative Negative/Trace    Glucose, UA Negative Negative    Ketones Negative Negative    Blood, UA Negative Negative    Bilirubin, UA Negative Negative    Urobilinogen, UA 0.2 0.2 - 1.0 mg/dL    Nitrite, UA Negative Negative    Microscopic Examination Comment     Microscopic Examination See below:     Urinalysis Reflex Comment    Vitamin D 25 Hydroxy    Specimen: Blood    Blood  Release to bruno   Result Value Ref Range    25 Hydroxy, Vitamin D 36.9 30.0 - 100.0 ng/mL   Urine Drug Screen -    Specimen: Blood    Blood  Release to bruno   Result Value Ref Range    Amphetamine, Urine Qual Negative Xcruun=1706 ng/mL    Barbiturates Screen, Urine Negative Ewsmdi=232 ng/mL    Benzodiazepine Screen, Urine Negative Vrsesz=617 ng/mL    THC Screen, Urine Negative Cutoff=20 ng/mL    Cocaine Screen, Urine Negative Gphcft=064 ng/mL    Opiate  Screen, Urine Positive (A) Fhpewk=329 ng/mL    Oxycodone/Oxymorphone, Urine Negative Nekoks=665 ng/mL    Phencyclidine (PCP), Urine Negative Cutoff=25 ng/mL    Methadone Screen, Urine Negative Xoqftw=193 ng/mL    Propoxyphene Screen Negative Zpkubo=147 ng/mL    Creatinine, Urine 101.0 20.0 - 300.0 mg/dL    pH, UA 5.7 4.5 - 8.9    Please note Comment    Microscopic Examination -    Blood  Release to bruno   Result Value Ref Range    WBC, UA None seen 0 - 5 /hpf    RBC, UA None seen 0 - 2 /hpf    Epithelial Cells (non renal) None seen 0 - 10 /hpf    Casts None seen None seen /lpf    Bacteria, UA None seen None seen/Few       Imaging:   No valid procedures specified.     Measures:   Advanced Care Planning:   Patient has an advance directive (not in EMR), copy requested.    Assessment / Plan      Assessment/Plan:   Diagnoses and all orders for this visit:    1. Chronic bilateral low back pain with left-sided sciatica (Primary)  Clinically very stable taking his Norco 7.5 mg number 60 tablets monthly, averaging twice daily, generally having good control of his back pain with no significant side effects.  He recently has noted less left-sided sciatica.  Continue current regimen supplemented by ibuprofen as needed.  Reassess in 3 months.  Note [place] screen and urine drug screens are appropriate.  2. Hypercalcemia  -     PTH, Intact; Future  -     Calcium, Ionized; Future  -     Cancel: Calcium, Ionized  -     PTH, Intact  -     Cancel: Calcium; Future  -     Calcium, Ionized; Future  -     Calcium, Ionized  Total calcium level noted elevated on screening at 10.7 now 3 months ago, normal range 8.6-10.2.  We will check an ionized calcium and intact PTH, calling for results.  3. Prediabetes  -     Basic Metabolic Panel; Future  -     Hemoglobin A1c; Future  -     Hemoglobin A1c  -     Basic Metabolic Panel  Hemoglobin A1c screen 3 months ago 5.7%, new diagnosis of prediabetes.  Repeat testing including glucose and another  hemoglobin A1c.  Emphasize healthy lifestyle.  4. Benign hypertension  -     Basic Metabolic Panel; Future  -     Basic Metabolic Panel  Blood pressures well controlled taking his regimen of losartan 100 mg daily and amlodipine 5 mg daily.  Continue current regimen with monitoring along with lifestyle change.  5. Need for prophylactic vaccination and inoculation against influenza  -     Fluzone High-Dose 65+yrs        Vaccine Counseling:  “Discussed risks/benefits to vaccination, reviewed components of the vaccine, discussed VIS, discussed informed consent, informed consent obtained. Patient/Parent was allowed to accept or refuse vaccine. Questions answered to satisfactory state of patient/Parent. We reviewed typical age appropriate and seasonally appropriate vaccinations. Reviewed immunization history and updated state vaccination form as needed. Patient was counseled on Influenza      Follow Up:   Return in about 3 months (around 2/11/2023) for Labs today.      At [facility], we believe that sharing information builds trust and better relationships. You are receiving this note because you recently visited [facility]. It is possible you will see health information before a provider has talked with you about it. This kind of information can be easy to misunderstand. To help you fully understand what it means for your health, we urge you to discuss this note with your provider.    Jf Lassiter MD  [facility]   Answers for HPI/ROS submitted by the patient on 11/4/2022  What is the primary reason for your visit?: Other  Please describe your symptoms.: This is my routine 90 day visit.  Have you had these symptoms before?: No  How long have you been having these symptoms?: 1-4 days

## 2022-11-11 NOTE — PROGRESS NOTES
Venipuncture Blood Specimen Collection  Venipuncture performed in left arm by Yessi Albarado MA with good hemostasis. Patient tolerated the procedure well without complications.   11/11/22   Yessi Albarado MA

## 2022-11-12 LAB
BUN SERPL-MCNC: 17 MG/DL (ref 8–27)
BUN/CREAT SERPL: 15 (ref 10–24)
CA-I SERPL ISE-MCNC: 5.8 MG/DL (ref 4.5–5.6)
CALCIUM SERPL-MCNC: 10 MG/DL (ref 8.6–10.2)
CHLORIDE SERPL-SCNC: 102 MMOL/L (ref 96–106)
CO2 SERPL-SCNC: 23 MMOL/L (ref 20–29)
CREAT SERPL-MCNC: 1.15 MG/DL (ref 0.76–1.27)
EGFRCR SERPLBLD CKD-EPI 2021: 67 ML/MIN/1.73
GLUCOSE SERPL-MCNC: 97 MG/DL (ref 70–99)
HBA1C MFR BLD: 5.6 % (ref 4.8–5.6)
POTASSIUM SERPL-SCNC: 4.3 MMOL/L (ref 3.5–5.2)
PTH-INTACT SERPL-MCNC: NORMAL PG/ML
SODIUM SERPL-SCNC: 140 MMOL/L (ref 134–144)
SPECIMEN STATUS: NORMAL

## 2022-11-15 ENCOUNTER — TELEPHONE (OUTPATIENT)
Dept: FAMILY MEDICINE CLINIC | Facility: CLINIC | Age: 72
End: 2022-11-15

## 2022-11-15 NOTE — TELEPHONE ENCOUNTER
Message left on patient's answering machine regarding results of recently obtained laboratory testing as follows:    BMP normal including total calcium of 10.0,  Intact PTH canceled for technical reasons  Ionized calcium mildly elevated at 5.8 with normal range 4.5-5.6  Hemoglobin A1c 5.6% versus previous value of 5.7%    Assessment/plan:  1.  Mild hypercalcemia.  Intact PTH unfortunately was not run for technical reasons.  We will repeat intact PTH and ionized calcium when he comes in for a follow-up visit in 3 months.  2.  Prediabetes, now improved to normal glycemia with high normal hemoglobin A1c.  Follow-up periodically.  3.  Remainder of lab testing satisfactory.  4.  Patient advised of test results and advised to contact me for any questions regarding plan of action

## 2022-11-28 DIAGNOSIS — M54.31 BACK PAIN WITH RIGHT-SIDED SCIATICA: ICD-10-CM

## 2022-11-28 RX ORDER — HYDROCODONE BITARTRATE AND ACETAMINOPHEN 7.5; 325 MG/1; MG/1
1 TABLET ORAL 2 TIMES DAILY
Qty: 60 TABLET | Refills: 0 | Status: SHIPPED | OUTPATIENT
Start: 2022-11-28 | End: 2022-12-27 | Stop reason: SDUPTHER

## 2022-11-28 NOTE — TELEPHONE ENCOUNTER
Caller: Leslie Kwon    Relationship: Emergency Contact    Best call back number: [phone]    Requested Prescriptions:   Requested Prescriptions     Pending Prescriptions Disp Refills   • HYDROcodone-acetaminophen (NORCO) 7.5-325 MG per tablet 60 tablet 0     Sig: Take 1 tablet by mouth 2 (Two) Times a Day.        [id number] [address]  AT [facility] [phone] [facility] [phone]      Additional details provided by patient: MEDICATION DUE TODAY.      Does the patient have less than a 3 day supply:  [x] Yes  [] No    Marion Bliss   11/28/22 10:19 EST

## 2022-12-27 DIAGNOSIS — M54.31 BACK PAIN WITH RIGHT-SIDED SCIATICA: ICD-10-CM

## 2022-12-27 RX ORDER — HYDROCODONE BITARTRATE AND ACETAMINOPHEN 7.5; 325 MG/1; MG/1
1 TABLET ORAL 2 TIMES DAILY
Qty: 60 TABLET | Refills: 0 | Status: SHIPPED | OUTPATIENT
Start: 2022-12-27 | End: 2023-01-25 | Stop reason: SDUPTHER

## 2022-12-27 NOTE — TELEPHONE ENCOUNTER
Caller: Leslie Kwon    Relationship: Emergency Contact    Best call back number:   ANNABEL  [phone]  Requested Prescriptions:   Requested Prescriptions     Pending Prescriptions Disp Refills   • HYDROcodone-acetaminophen (NORCO) 7.5-325 MG per tablet 60 tablet 0     Sig: Take 1 tablet by mouth 2 (Two) Times a Day.        Pharm[facility] where [facility] be sent[id number] [address] AT [facility] [phone] [facility] [phone] FX     Additional details provided by patient:      Does the patient have less than a 3 day supply:  [x] Yes  [] No    Would you like a call back once the refill request has been completed: [x] Yes [] No

## 2023-01-25 DIAGNOSIS — M54.31 BACK PAIN WITH RIGHT-SIDED SCIATICA: ICD-10-CM

## 2023-01-26 RX ORDER — CEPHALEXIN 500 MG/1
CAPSULE ORAL
COMMUNITY
Start: 2022-12-06

## 2023-01-26 RX ORDER — HYDROCODONE BITARTRATE AND ACETAMINOPHEN 7.5; 325 MG/1; MG/1
1 TABLET ORAL 2 TIMES DAILY
Qty: 60 TABLET | Refills: 0 | Status: SHIPPED | OUTPATIENT
Start: 2023-01-26 | End: 2023-02-24 | Stop reason: SDUPTHER

## 2023-02-17 ENCOUNTER — OFFICE VISIT (OUTPATIENT)
Dept: FAMILY MEDICINE CLINIC | Facility: CLINIC | Age: 73
End: 2023-02-17
Payer: MEDICARE

## 2023-02-17 VITALS
HEIGHT: 68 IN | DIASTOLIC BLOOD PRESSURE: 77 MMHG | TEMPERATURE: 98.2 F | HEART RATE: 81 BPM | BODY MASS INDEX: 29.04 KG/M2 | WEIGHT: 191.6 LBS | SYSTOLIC BLOOD PRESSURE: 121 MMHG | OXYGEN SATURATION: 98 %

## 2023-02-17 DIAGNOSIS — E83.52 HYPERCALCEMIA: ICD-10-CM

## 2023-02-17 DIAGNOSIS — N20.1 URETEROLITHIASIS: ICD-10-CM

## 2023-02-17 DIAGNOSIS — R73.03 PREDIABETES: ICD-10-CM

## 2023-02-17 DIAGNOSIS — I10 BENIGN HYPERTENSION: ICD-10-CM

## 2023-02-17 DIAGNOSIS — G89.29 CHRONIC BILATERAL LOW BACK PAIN WITH LEFT-SIDED SCIATICA: Primary | ICD-10-CM

## 2023-02-17 DIAGNOSIS — M54.42 CHRONIC BILATERAL LOW BACK PAIN WITH LEFT-SIDED SCIATICA: Primary | ICD-10-CM

## 2023-02-17 PROCEDURE — 36415 COLL VENOUS BLD VENIPUNCTURE: CPT | Performed by: INTERNAL MEDICINE

## 2023-02-17 PROCEDURE — 99214 OFFICE O/P EST MOD 30 MIN: CPT | Performed by: INTERNAL MEDICINE

## 2023-02-17 NOTE — PROGRESS NOTES
Follow Up Office Visit      Date: 2023   Patient Name: Larry Kwon  : 1950   MRN: [id number]     Chief Complaint:    Chief Complaint   Patient presents with   • Follow-up       History of Present Illness: Larry Kwon is a 72 y.o. male who is here today for routine 3-month review of his chronic back pain with primarily left-sided sciatica intermittently, going down to his ankle but not into his foot, no numbness no weakness, no loss of bowel or bladder control.  Pain is variable but always present on a daily basis, using Norco 7.5 mg prescribed twice daily for 60 tablets monthly though sometimes taking 01 tablet daily and other x3-4 daily depending on the degree of his pain.  Overall has not improved quality of life with the Norco with no significant side effects, [place] screens always appropriate, urine drug screen appropriate from 2022.  Also history of prediabetes with most recent hemoglobin A1c improved to 5.6% in 2022.  Hypertension with blood pressures averaging 120s over 70s checked twice monthly with no chest pains palpitations dyspnea or edema.  History of hypercalcemia with most recent ionized calcium slightly elevated at 5.8 in 2022 but intact PTH not obtained by lab for ill disclosed technical reason.  Patient also has a history of recurrent kidney stones apparently in the last month and 1/2 to 2 months having had a recurrent right-sided stone that required ureteral stent placement and subsequent removal by Dr. Das of urology in [place] at [facility].    Subjective      Review of Systems:   Review of Systems    I have reviewed the patients family history, social history, past medical history, past surgical history and have updated it as appropriate.     Medications:     Current Outpatient Medications:   •  allopurinol (ZYLOPRIM) 100 MG tablet, Take 100 mg by mouth Daily., Disp: , Rfl:   •  amLODIPine (NORVASC) 5 MG tablet, Take 5 mg by mouth Daily., Disp: , Rfl:   •   "atorvastatin (LIPITOR) 20 MG tablet, Take 20 mg by mouth Daily., Disp: , Rfl:   •  BinaxNOW COVID-19 Ag Home Test kit, TEST AS DIRECTED TODAY, Disp: , Rfl:   •  cephalexin (KEFLEX) 500 MG capsule, TAKE 1 CAPSULE BY MOUTH TWICE DAILY FOR 3 DAYS, Disp: , Rfl:   •  cholecalciferol (VITAMIN D3) 25 MCG (1000 UT) tablet, , Disp: , Rfl:   •  HYDROcodone-acetaminophen (NORCO) 7.5-325 MG per tablet, Take 1 tablet by mouth 2 (Two) Times a Day., Disp: 60 tablet, Rfl: 0  •  latanoprost (XALATAN) 0.005 % ophthalmic solution, , Disp: , Rfl:   •  losartan (COZAAR) 100 MG tablet, , Disp: , Rfl:   •  tamsulosin (FLOMAX) 0.4 MG capsule 24 hr capsule, Take 1 capsule by mouth Daily., Disp: , Rfl:     Allergies:   Allergies   Allergen Reactions   • Ace Inhibitors Cough       Objective     Physical Exam: Please see above  Vital Signs:   Vitals:    02/17/23 1432   BP: 121/77   Pulse: 81   Temp: 98.2 °F (36.8 °C)   TempSrc: Temporal   SpO2: 98%   Weight: 86.9 kg (191 lb 9.6 oz)   Height: 172.7 cm (68\")     Body mass index is 29.13 kg/m².       Physical Exam  General: Healthy pleasant 72-year-old white male alert and oriented in no acute distress.  Neck supple with no masses or tenderness  Lungs clear  Cardiac regular rate rhythm with no murmurs gallops or rubs, no dependent edema  Musculoskeletal exam with some mild left SI tenderness to palpation, slight leg raise in the supine and seated position causing some mild posterior thigh discomfort,  Neurological exam with 2+ right knee jerk, absent left knee jerk, 2+ ankle jerks bilaterally with normal strength and sensation in his lower extremities, positive straight leg raise with mild leg discomfort on the left as noted above  Procedures    Results:   Labs:   Hemoglobin A1C   Date Value Ref Range Status   11/11/2022 5.6 4.8 - 5.6 % Final     Comment:              Prediabetes: 5.7 - 6.4           Diabetes: >6.4           Glycemic control for adults with diabetes: <7.0     11/04/2021 5.4 4.3 - " 5.6 % Final     Comment:     (note)  A1C% Reference Range:  4.3 - 5.6  Normal range  5.7 - 6.4  Pre-diabetic -increased risk for developing diabetes  mellitus.  >=6.5      Diabetic -diagnostic of diabetes mellitus.    Note: For diagnosis of diabetes in individuals without unequivocal  hyperglycemia, results should be confirmed by repeat testing.  Patients with conditions that shorten erythrocyte survival, such as  recovery from acute blood loss, hemolytic anemia, kidney disease,  or the presence of unstable hemogloblins like HbSS, HbCC, and HbSC  may yield falsely decreased HbA1c test results. Iron deficiency may  yield falsely increased HbA1c test results.     TSH   Date Value Ref Range Status   08/05/2022 2.380 0.450 - 4.500 uIU/mL Final   10/30/2020 1.460 0.470 - 4.680 u(iU)/mL Final     Comment:     (note)  Higher dose biotin supplements may interfere with this test.  Interpret results within the context of patient's clinical picture.        POCT Results (if applicable):   Results for orders placed or performed in visit on 11/11/22   PTH, Intact    Specimen: Arm, Left; Blood    Blood  Release to bruno   Result Value Ref Range    PTH, Intact CANCELED pg/mL   Hemoglobin A1c    Specimen: Arm, Left; Blood    Blood  Release to bruno   Result Value Ref Range    Hemoglobin A1C 5.6 4.8 - 5.6 %   Basic Metabolic Panel    Specimen: Arm, Left; Blood    Blood  Release to bruno   Result Value Ref Range    Glucose 97 70 - 99 mg/dL    BUN 17 8 - 27 mg/dL    Creatinine 1.15 0.76 - 1.27 mg/dL    EGFR Result 67 >59 mL/min/1.73    BUN/Creatinine Ratio 15 10 - 24    Sodium 140 134 - 144 mmol/L    Potassium 4.3 3.5 - 5.2 mmol/L    Chloride 102 96 - 106 mmol/L    Total CO2 23 20 - 29 mmol/L    Calcium 10.0 8.6 - 10.2 mg/dL   Calcium, Ionized    Specimen: Arm, Left; Blood    Blood  Release to bruno   Result Value Ref Range    Ionized Calcium 5.8 (H) 4.5 - 5.6 mg/dL   Specimen Status Report    Blood  Release to bruno   Result Value Ref  Range    Specimen Status CANCELED        Imaging:   No valid procedures specified.     Assessment / Plan      Assessment/Plan:   Diagnoses and all orders for this visit:    1. Chronic bilateral low back pain with left-sided sciatica (Primary)  Chronic pain syndrome with intermittent but frequent left-sided sciatica, overall does well taking his Norco 7.5 mg averaging twice daily or 60 tablets/month.  Not yet due for refill.  No acute suspicion of inappropriate use of medication or diversion.  Continue current regimen  2. Prediabetes  Most recent hemoglobin A1c improved to 5.6% in 11/2022.  We will update with his next yearly labs.  3. Benign hypertension  Blood pressure control acutely as well as chronically well controlled on his current regimen of losartan 100 mg daily and amlodipine 5 mg daily  4. Hypercalcemia  -     PTH, Intact; Future  -     Calcium, Ionized; Future  Most recent ionized calcium 5.8, mildly elevated in 11/2022 with intact PTH at that time not obtained by lab for technical reason.  We will repeat an ionized calcium and reorder an intact PTH.  We will notify patient of results  5. Ureterolithiasis  Recurrent to ureterolithiasis on the right by history approximately 1-1/2 to 2 months ago, status post ureteral stent placement and subsequent removal by Dr. Das of urology at [facility] in [place].  Keep follow-up with urology regularly      Follow Up:   Return in about 3 months (around 5/17/2023) for Recheck, Labs today.      At [facility], we believe that sharing information builds trust and better relationships. You are receiving this note because you recently visited [facility]. It is possible you will see health information before a provider has talked with you about it. This kind of information can be easy to misunderstand. To help you fully understand what it means for your health, we urge you to discuss this note with your provider.    Jf Lassiter MD  [facility]

## 2023-02-17 NOTE — PROGRESS NOTES
Venipuncture Blood Specimen Collection  Venipuncture performed in left arm by Laura Pollard MA with good hemostasis. Patient tolerated the procedure well without complications.   02/17/23   Laura Pollard MA

## 2023-02-18 LAB
CA-I SERPL ISE-MCNC: 5.7 MG/DL (ref 4.5–5.6)
PTH-INTACT SERPL-MCNC: 61 PG/ML (ref 15–65)

## 2023-02-21 ENCOUNTER — TELEPHONE (OUTPATIENT)
Dept: FAMILY MEDICINE CLINIC | Facility: CLINIC | Age: 73
End: 2023-02-21
Payer: MEDICARE

## 2023-02-21 DIAGNOSIS — E83.52 HYPERCALCEMIA: Primary | ICD-10-CM

## 2023-02-21 NOTE — TELEPHONE ENCOUNTER
Conversation with patient regarding recent lab testing from 2/17/2023 as follows:    Intact PTH high normal at 61 with normal 15-65  Ionized calcium elevated at 5.7, normal 4.5-5.6, noting previous has calcium of 3 months prior 5.8, total calcium elevated at 10.7 with albumin 4.8 in 8/2022, total calcium 11.0 with albumin 4.2 in 11/2021, and first noted elevated total calcium of 10.4 with no corresponding albumin in 10/2021.  25 hydroxy vitamin D 36.9 on supplement from 8/2022    Assessment/plan:  1.  Hypercalcemia with inappropriately high normal intact PTH.  Suspect primary hyperparathyroidism, though could potentially have familial hypocalciuric hypercalcemia.  First noted of elevated total calcium in 10/2021, patient having history of recurrent renal stones but no other known potential side effects.  I will make a referral to endocrinology in [place], patient's primary residence, for further evaluation.

## 2023-02-24 DIAGNOSIS — M54.31 BACK PAIN WITH RIGHT-SIDED SCIATICA: ICD-10-CM

## 2023-02-24 RX ORDER — HYDROCODONE BITARTRATE AND ACETAMINOPHEN 7.5; 325 MG/1; MG/1
1 TABLET ORAL 2 TIMES DAILY
Qty: 60 TABLET | Refills: 0 | Status: SHIPPED | OUTPATIENT
Start: 2023-02-24 | End: 2023-03-23 | Stop reason: SDUPTHER

## 2023-02-24 NOTE — TELEPHONE ENCOUNTER
He has called for a refill on his pain medication. His last appt for this was 02/17/2023.He does have his follow up scheduled. KELLY

## 2023-02-28 ENCOUNTER — OFFICE VISIT (OUTPATIENT)
Dept: ENDOCRINOLOGY | Age: 73
End: 2023-02-28
Payer: MEDICARE

## 2023-02-28 VITALS
TEMPERATURE: 98 F | WEIGHT: 193.6 LBS | HEIGHT: 68 IN | OXYGEN SATURATION: 96 % | HEART RATE: 84 BPM | SYSTOLIC BLOOD PRESSURE: 110 MMHG | BODY MASS INDEX: 29.34 KG/M2 | DIASTOLIC BLOOD PRESSURE: 72 MMHG

## 2023-02-28 DIAGNOSIS — E21.0 PRIMARY HYPERPARATHYROIDISM: ICD-10-CM

## 2023-02-28 DIAGNOSIS — E83.52 HYPERCALCEMIA: Primary | ICD-10-CM

## 2023-02-28 PROCEDURE — 99204 OFFICE O/P NEW MOD 45 MIN: CPT | Performed by: INTERNAL MEDICINE

## 2023-02-28 NOTE — PROGRESS NOTES
New Patient      Chief Complaint    Chief Complaint   Patient presents with   • Abnormal Calcium        HPI:   Larry Kwon is a 72 y.o. male sent to us for consultative evaluation and management of hypercalcemia.  He thinks that the problem was probably noticed about 6 months ago. However, looking through his records, the last time his calcium was normal was on October 30, 2020, when it was 9.9 mg/dL with albumin of 4.0 g/dL.  His vitamin D at the time was 38.8 ng/mL.  Thereafter his calcium levels have been elevated.  His ionized calcium on February 17, 2023, was 5.7 mg/dL (<5.6), with intact PTH of 61 pg/mL and on November 11, 2022, his ionized calcium was 5.8 mg/dL.  His calcium was 10.0 mg/dL.  There was no intact PTH.  On August 5, 2022, his calcium was 10.7 mg/dL with an albumin of 4.8 g/dL which would correct to 10.14 mg/dL.  His vitamin D level at the time was 36.9 ng/mL.  On November 4, 2021, his calcium was 11.0 mg/dL with an albumin of 4.2 g/dL.  He does not have any history of bone fractures but he has a history of kidney stones.  In fact the last one was about 1 to 2 months ago.  He is not on calcium supplementation and he is not on hydrochlorothiazide.  He is on vitamin D as of 1000 international units daily and he does regular walking.  He does not have any history of thyroid disease and his TSH checked on August 5, 2022 was 2.380 uIU/mL.          Past Medical History:   Diagnosis Date   • Allergic rhinitis    • Arthropathy of cervical spine    • BPH (benign prostatic hyperplasia)    • Callus of toe     LEFT GREAT TOE   • Diverticulosis of large intestine without perforation or abscess without bleeding    • Dyslipidemia    • Dysthymia    • Elevated cholesterol    • Glaucoma     BILATERAL   • Gout    • Hypertension    • Irritability    • Lumbago     INTERMITTENT   • Meralgia paresthetica, left lower limb    • Ocular hypertension     BORDERLINE   • Pain in right knee    • Radiculopathy of leg     LEFT    • Radiculopathy, cervical     LEFT SIDED   • RBBB    • Right nephrolithiasis    • Urinary calculi    • Vitamin D deficiency 2016    Dr. Lassiter prescribes.          ROS:  Pertinent to this visit, only as mentioned above.  The rest was negative.    Social History     Socioeconomic History   • Marital status:    Tobacco Use   • Smoking status: Never   • Smokeless tobacco: Never   Vaping Use   • Vaping Use: Never used   Substance and Sexual Activity   • Alcohol use: Yes     Alcohol/week: 2.0 - 3.0 standard drinks     Types: 2 - 3 Glasses of wine per week     Comment: rarely   • Drug use: Never   • Sexual activity: Yes     Partners: Female     Birth control/protection: Vasectomy       Physical Exam:  GENERAL: He looked well.  HEENT: Normal examination.  NECK: Normal examination without any readily palpable thyroid enlargement, discrete nodules or any other masses  LUNGS: Clear to auscultation bilaterally  CVS: RRR  EXTREMITIES: Normal examination.    Assessment:  1.  Hypercalcemia with elevated intact PTH consistent with primary hyperparathyroidism    Diagnoses and all orders for this visit:    1. Hypercalcemia (Primary)  -     Calcium  -     Albumin  -     PTH, Intact  -     Vitamin D 25 Hydroxy  -     NM Parathyroid Scan w SPECT; Future  -     DEXA Bone Density Axial; Future  -     Ambulatory Referral to General Surgery    2. Primary hyperparathyroidism (HCC)  -     Calcium  -     Albumin  -     PTH, Intact  -     Vitamin D 25 Hydroxy  -     NM Parathyroid Scan w SPECT; Future  -     DEXA Bone Density Axial; Future  -     Ambulatory Referral to General Surgery    Recommendations:  1.  We reviewed with Mr. Kwon the history and the finding of the elevated calcium along with elevated intact PTH, consistent with primary hyperparathyroidism.  2.  We discussed the potential complications of untreated elevated calcium such as kidney stones, bone loss and osteoporosis and kidney disease.  3.  We recommend to obtain a  repeat calcium along with intact PTH, vitamin D and albumin levels.  4.  We discussed the various treatment options including conservative follow-up, use of medication such as Sensipar (Cinacalcet) to lower the PTH and thereby lower the calcium levels or sending him for a parathyroidectomy.  5.  He prefers to go along with a parathyroidectomy and so we do recommend to obtain a parathyroid sestamibi scan and we also recommend to obtain a bone mineral density to assess for any bone loss and we placed orders for both of those.  6.  We are referring him to Dr. Cecilia Gil for the parathyroidectomy.  7.  He will come back for follow-up after the parathyroidectomy and at that time we will review his bone mineral density and talk about treatment options if he has sustained significant bone loss.  8.  In the meantime we asked him to optimize his vitamin D intake to 2000 international units daily with his evening meal and to maintain regular weightbearing exercise as he is currently doing.  9.  We gave him the opportunity to ask questions, which we answered and we addressed his concerns.

## 2023-03-02 ENCOUNTER — PATIENT ROUNDING (BHMG ONLY) (OUTPATIENT)
Dept: ENDOCRINOLOGY | Age: 73
End: 2023-03-02

## 2023-03-23 DIAGNOSIS — M54.31 BACK PAIN WITH RIGHT-SIDED SCIATICA: ICD-10-CM

## 2023-03-23 RX ORDER — HYDROCODONE BITARTRATE AND ACETAMINOPHEN 7.5; 325 MG/1; MG/1
1 TABLET ORAL 2 TIMES DAILY
Qty: 60 TABLET | Refills: 0 | Status: SHIPPED | OUTPATIENT
Start: 2023-03-23

## 2023-03-23 NOTE — TELEPHONE ENCOUNTER
Caller: Leslie Kwon    Relationship: Emergency Contact    Best call back number: [phone]    Requested Prescriptions:   Requested Prescriptions     Pending Prescriptions Disp Refills   • HYDROcodone-acetaminophen (NORCO) 7.5-325 MG per tablet 60 tablet 0     Sig: Take 1 tablet by mouth 2 (Two) Times a Day.        Pharmacy where request should be sent: [facility] #[id number] [address] [place] [address]  AT [facility] [phone] [facility] [phone]      Last office visit with prescribing clinician: 2/17/2023   Last telemedicine visit with prescribing clinician: 5/19/2023   Next office visit with prescribing clinician: 5/19/2023     Additional details provided by patient: OUT OF MEDICATION     Does the patient have less than a 3 day supply:  [x] Yes  [] No    Would you like a call back once the refill request has been completed: [] Yes [x] No    If the office needs to give you a call back, can they leave a voicemail: [] Yes [x] No    Sanaz Nuñez Rep   03/23/23 10:48 EDT

## 2023-03-31 LAB
25(OH)D3+25(OH)D2 SERPL-MCNC: 38.4 NG/ML (ref 30–100)
ALBUMIN SERPL-MCNC: 4.2 G/DL (ref 3.7–4.7)
CALCIUM SERPL-MCNC: 10 MG/DL (ref 8.6–10.2)
PTH-INTACT SERPL-MCNC: 77 PG/ML (ref 15–65)

## 2023-04-06 ENCOUNTER — TELEPHONE (OUTPATIENT)
Dept: SURGERY | Facility: CLINIC | Age: 73
End: 2023-04-06
Payer: MEDICARE

## 2023-04-06 NOTE — TELEPHONE ENCOUNTER
SPOKE TO PT AND INFORMED THAT THE APPT SCHEDULED ON 4/10 WITH DR BROWN NEEDED TO BE AFTER HE HAS THE PARATHYROID SPECT SCAN. INSTRUCTED HIM TO CALL US AFTER HE HAS THE TEST SCHEDULED SO WE CAN RESCHEDULE HIS APPT WITH US.

## 2023-04-17 DIAGNOSIS — E21.0 PRIMARY HYPERPARATHYROIDISM: Primary | ICD-10-CM

## 2023-04-21 DIAGNOSIS — M54.31 BACK PAIN WITH RIGHT-SIDED SCIATICA: ICD-10-CM

## 2023-04-21 RX ORDER — HYDROCODONE BITARTRATE AND ACETAMINOPHEN 7.5; 325 MG/1; MG/1
1 TABLET ORAL 2 TIMES DAILY
Qty: 60 TABLET | Refills: 0 | Status: SHIPPED | OUTPATIENT
Start: 2023-04-21

## 2023-04-21 NOTE — TELEPHONE ENCOUNTER
Caller: Leslie Kwon    Relationship: Emergency Contact    Best call back number: [phone]    Requested Prescriptions:   Requested Prescriptions     Pending Prescriptions Disp Refills   • HYDROcodone-acetaminophen (NORCO) 7.5-325 MG per tablet 60 tablet 0     Sig: Take 1 tablet by mouth 2 (Two) Times a Day.        Pharmacy where request should be sent: [facility] #[id number] [address]  AT [facility] [phone] [facility] [phone] [place]     Last office visit with prescribing clinician: 2/17/2023   Last telemedicine visit with prescribing clinician: 5/19/2023   Next office visit with prescribing clinician: 5/19/2023       Does the patient have less than a 3 day supply:  [x] Yes  [] No    Would you like a call back once the refill request has been completed: [x] Yes [] No    If the office needs to give you a call back, can they leave a voicemail: [x] Yes [] No    Sanaz Montemayor Rep   04/21/23 09:24 EDT

## 2023-04-28 ENCOUNTER — HOSPITAL ENCOUNTER (OUTPATIENT)
Dept: NUCLEAR MEDICINE | Facility: HOSPITAL | Age: 73
Discharge: HOME | End: 2023-04-28
Payer: MEDICARE

## 2023-04-28 DIAGNOSIS — E21.0 PRIMARY HYPERPARATHYROIDISM: ICD-10-CM

## 2023-04-28 PROCEDURE — 78072 PARATHYRD PLANAR W/SPECT&CT: CPT

## 2023-04-28 PROCEDURE — A9500 TC99M SESTAMIBI: HCPCS | Performed by: INTERNAL MEDICINE

## 2023-04-28 PROCEDURE — 0 TECHNETIUM SESTAMIBI: Performed by: INTERNAL MEDICINE

## 2023-04-28 RX ADMIN — TECHNETIUM TC 99M SESTAMIBI 1 DOSE: 1 INJECTION INTRAVENOUS at 11:20

## 2023-05-18 PROBLEM — M51.369 DEGENERATION OF INTERVERTEBRAL DISC OF LUMBAR REGION: Status: ACTIVE | Noted: 2023-05-18

## 2023-05-18 PROBLEM — N20.0 KIDNEY STONES: Status: ACTIVE | Noted: 2023-05-18

## 2023-05-18 PROBLEM — Z23 COVID-19 VACCINE ADMINISTERED: Status: ACTIVE | Noted: 2023-05-18

## 2023-05-18 PROBLEM — J30.2 SEASONAL ALLERGIES: Status: ACTIVE | Noted: 2023-05-18

## 2023-05-18 PROBLEM — H40.9 GLAUCOMA: Status: ACTIVE | Noted: 2023-05-18

## 2023-05-18 PROBLEM — R79.89 LOW SERUM VITAMIN D: Status: ACTIVE | Noted: 2023-05-18

## 2023-05-18 PROBLEM — M10.9 GOUT: Status: ACTIVE | Noted: 2023-05-18

## 2023-05-18 PROBLEM — T88.59XA COMPLICATION OF ANESTHESIA: Status: ACTIVE | Noted: 2023-05-18

## 2023-05-18 PROBLEM — M51.36 DEGENERATION OF INTERVERTEBRAL DISC OF LUMBAR REGION: Status: ACTIVE | Noted: 2023-05-18

## 2023-05-18 PROBLEM — I10 HTN (HYPERTENSION): Status: ACTIVE | Noted: 2023-05-18

## 2023-05-18 RX ORDER — MONTELUKAST SODIUM 10 MG/1
1 TABLET ORAL NIGHTLY
COMMUNITY
Start: 2023-03-09

## 2023-05-19 ENCOUNTER — OFFICE VISIT (OUTPATIENT)
Dept: FAMILY MEDICINE CLINIC | Facility: CLINIC | Age: 73
End: 2023-05-19
Payer: MEDICARE

## 2023-05-19 VITALS
WEIGHT: 187.6 LBS | SYSTOLIC BLOOD PRESSURE: 124 MMHG | HEART RATE: 69 BPM | BODY MASS INDEX: 28.43 KG/M2 | HEIGHT: 68 IN | TEMPERATURE: 98.6 F | DIASTOLIC BLOOD PRESSURE: 80 MMHG | OXYGEN SATURATION: 98 %

## 2023-05-19 DIAGNOSIS — M54.41 CHRONIC BILATERAL LOW BACK PAIN WITH BILATERAL SCIATICA: Primary | ICD-10-CM

## 2023-05-19 DIAGNOSIS — M54.42 CHRONIC BILATERAL LOW BACK PAIN WITH BILATERAL SCIATICA: Primary | ICD-10-CM

## 2023-05-19 DIAGNOSIS — G89.29 CHRONIC BILATERAL LOW BACK PAIN WITH BILATERAL SCIATICA: Primary | ICD-10-CM

## 2023-05-19 DIAGNOSIS — I10 BENIGN HYPERTENSION: ICD-10-CM

## 2023-05-19 DIAGNOSIS — E66.3 OVERWEIGHT (BMI 25.0-29.9): ICD-10-CM

## 2023-05-19 DIAGNOSIS — E21.0 PRIMARY HYPERPARATHYROIDISM: ICD-10-CM

## 2023-05-19 DIAGNOSIS — R73.03 PREDIABETES: ICD-10-CM

## 2023-05-19 LAB
EXPIRATION DATE: NORMAL
GLUCOSE BLDC GLUCOMTR-MCNC: 98 MG/DL (ref 70–130)
HBA1C MFR BLD: 5.6 %
Lab: NORMAL

## 2023-05-19 RX ORDER — HYDROCODONE BITARTRATE AND ACETAMINOPHEN 7.5; 325 MG/1; MG/1
1 TABLET ORAL 2 TIMES DAILY
Qty: 60 TABLET | Refills: 0 | Status: SHIPPED | OUTPATIENT
Start: 2023-05-19

## 2023-05-19 NOTE — PROGRESS NOTES
Follow Up Office Visit      Date: 2023   Patient Name: Larry Kwon  : 1950   MRN: [id number]     Chief Complaint:    Chief Complaint   Patient presents with   • Follow-up       History of Present Illness: Larry Kwon is a 73 y.o. male who is here today for routine 3-month review of his chronic back pain with primarily left-sided but occasionally right-sided sciatica, the pain primarily going down his left leg to his knee, skipping his foreleg and going into his foot.  The pain occurs most days but is variable and frequency and intensity, also having fairly persistent low back pain with more severe exacerbations intermittently.  Uses Norco 7.5 mg historically sporadically but total 60 over the course of month, but is now been taking Norco 1.5 mg at half tablet twice daily with extra Norco when he has more of a flareup of pain multiple times monthly.  No side effects of medication overall having improved quality of life.  [place] screen appropriate, UDS screen appropriate from 2022.  Since last being seen I had noted elevated calcium, elevated subsequent ionized calcium with inappropriately elevated intact PTH, referring him to endocrinologist due to concern regarding primary hyperparathyroidism which was confirmed by the endocrinologist, subsequently having a sestamibi scan of his neck revealing tracer uptake both in the right thyroid and left lower thyroid, being referred to Dr. Collins of general surgery for consideration of parathyroidectomy, this choice elected by patient rather than medication control, appoint with Dr. Collins on 2023 upcoming next week, and appointment to follow-up with endocrinologist on 2023.  Patient also has history of prediabetes with a hemoglobin A1c maximum 5.7% 9 months ago and 5.6% 6 months ago.  No neuropathic symptoms other than his radiculopathy from his lumbago.  History of hypertension taking amlodipine 5 mg daily and losartan 100 mg daily with blood  "pressures checked weekly averaging 120s over upper 70s to low 80s.  No chest pains palpitations unusual dyspnea dizziness headaches or edema.  Due for his complete physical in 3 months.    Subjective      Review of Systems:   Review of Systems    I have reviewed the patients family history, social history, past medical history, past surgical history and have updated it as appropriate.     Medications:     Current Outpatient Medications:   •  allopurinol (ZYLOPRIM) 100 MG tablet, Take 1 tablet by mouth Daily., Disp: , Rfl:   •  amLODIPine (NORVASC) 5 MG tablet, Take 1 tablet by mouth Daily., Disp: , Rfl:   •  atorvastatin (LIPITOR) 20 MG tablet, Take 1 tablet by mouth Daily., Disp: , Rfl:   •  BinaxNOW COVID-19 Ag Home Test kit, TEST AS DIRECTED TODAY, Disp: , Rfl:   •  cholecalciferol (VITAMIN D3) 25 MCG (1000 UT) tablet, , Disp: , Rfl:   •  HYDROcodone-acetaminophen (NORCO) 7.5-325 MG per tablet, Take 1 tablet by mouth 2 (Two) Times a Day., Disp: 60 tablet, Rfl: 0  •  latanoprost (XALATAN) 0.005 % ophthalmic solution, , Disp: , Rfl:   •  losartan (COZAAR) 100 MG tablet, , Disp: , Rfl:   •  montelukast (SINGULAIR) 10 MG tablet, Take 1 tablet by mouth Every Night., Disp: , Rfl:   •  tamsulosin (FLOMAX) 0.4 MG capsule 24 hr capsule, Take 1 capsule by mouth Daily., Disp: , Rfl:     Allergies:   Allergies   Allergen Reactions   • Ace Inhibitors Cough       Objective     Physical Exam: Please see above  Vital Signs:   Vitals:    05/19/23 1326   BP: 124/80   BP Location: Left arm   Patient Position: Sitting   Cuff Size: Adult   Pulse: 69   Temp: 98.6 °F (37 °C)   TempSrc: Temporal   SpO2: 98%   Weight: 85.1 kg (187 lb 9.6 oz)   Height: 172.7 cm (68\")     Body mass index is 28.52 kg/m².  BMI is >= 25 and <30. (Overweight) The following options were offered after discussion;: exercise counseling/recommendations and nutrition counseling/recommendations        Physical Exam  General: Always a very pleasant healthy 73-year-old " male in no acute distress.  Alert and oriented.  Affect very appropriate.  Neck is supple with no masses or tenderness  Lungs clear with no wheeze tachypnea or cough  Cardiac regular rate rhythm with no murmurs gallops or rubs and no dependent edema  Musculoskeletal exam chronically having some mild generalized tenderness to palpation across his beltline in both SI regions more prominently,  Neurological exam with 2+ right knee jerk, essentially absent left knee jerk, with 2+ ankle jerks bilaterally and symmetrically, having normal strength and sensation in his lower extremities, currently negative straight leg raise bilaterally in a seated position  Procedures    Results:   Labs:   Hemoglobin A1C   Date Value Ref Range Status   05/19/2023 5.6 % Final   11/04/2021 5.4 4.3 - 5.6 % Final     Comment:     (note)  A1C% Reference Range:  4.3 - 5.6  Normal range  5.7 - 6.4  Pre-diabetic -increased risk for developing diabetes  mellitus.  >=6.5      Diabetic -diagnostic of diabetes mellitus.    Note: For diagnosis of diabetes in individuals without unequivocal  hyperglycemia, results should be confirmed by repeat testing.  Patients with conditions that shorten erythrocyte survival, such as  recovery from acute blood loss, hemolytic anemia, kidney disease,  or the presence of unstable hemogloblins like HbSS, HbCC, and HbSC  may yield falsely decreased HbA1c test results. Iron deficiency may  yield falsely increased HbA1c test results.     TSH   Date Value Ref Range Status   08/05/2022 2.380 0.450 - 4.500 uIU/mL Final   10/30/2020 1.460 0.470 - 4.680 u(iU)/mL Final     Comment:     (note)  Higher dose biotin supplements may interfere with this test.  Interpret results within the context of patient's clinical picture.        POCT Results (if applicable):   Results for orders placed or performed in visit on 05/19/23   POC Glucose    Specimen: Blood   Result Value Ref Range    Glucose 98 70 - 130 mg/dL   POC Glycosylated  Hemoglobin (Hb A1C)    Specimen: Blood   Result Value Ref Range    Hemoglobin A1C 5.6 %    Lot Number [id number]     Expiration Date 01/31/2025        Imaging:   No valid procedures specified.     Assessment / Plan      Assessment/Plan:   Diagnoses and all orders for this visit:    1. Chronic bilateral low back pain with bilateral sciatica (Primary)  -     HYDROcodone-acetaminophen (NORCO) 7.5-325 MG per tablet; Take 1 tablet by mouth 2 (Two) Times a Day.  Dispense: 60 tablet; Refill: 0  Patient overall doing well with chronic back pain and intermittent but frequent left greater than right sided sciatica, chronically depressed left knee jerk, due for refill of his Norco 7.5 mg averaging twice daily for number 60 tablets/month, recently taking 1/2 tablet twice daily scheduled with extra Norco for flareups of his pain.  UDS from 8/2022 appropriate, pain management agreement filled out today.  No concern on my part regarding inappropriate use or diversion.  Reassess in 3 months  2. Benign hypertension  Good pressure control acutely and chronically taking amlodipine 5 mg daily and losartan 100 mg daily.  Continue current regimen with monitoring.  3. Prediabetes  -     POC Glucose  -     POC Glycosylated Hemoglobin (Hb A1C)  Hemoglobin A1c today stable at 5.6% versus previous value 6 months prior, noting prior to that had a hemoglobin A1c mildly elevated at 5.7%.  Emphasize healthy lifestyle with diet and exercise and we will follow-up testing periodically  4. Primary hyperparathyroidism  Recent diagnosis of primary hyperparathyroidism, sestamibi scan ordered by endocrinologist on 4/28/2023 revealing tracer uptake both in the right thyroid as well as left lower thyroid.  He is scheduled to see a Dr. Collins of general surgery in [place], his place of residence, on 5/24/2023 to discuss surgical resection, this treatment being opted over medication therapy.  5.  Overweight (BMI 25.0-29.9)  Patient has lost 6 pounds  weight since he was last seen 3 months ago.  Continue healthy lifestyle with diet and exercise.    Follow Up:   Return in about 3 months (around 8/19/2023) for Medicare Subsq..      At [facility], we believe that sharing information builds trust and better relationships. You are receiving this note because you recently visited [facility]. It is possible you will see health information before a provider has talked with you about it. This kind of information can be easy to misunderstand. To help you fully understand what it means for your health, we urge you to discuss this note with your provider.    Jf Lassiter MD  [facility]   Answers for HPI/ROS submitted by the patient on 5/18/2023  What is the primary reason for your visit?: Other  Please describe your symptoms.: 90 day check up to follow up on meds  Have you had these symptoms before?: No  How long have you been having these symptoms?: 1-4 days  Please list any medications you are currently taking for this condition.: NA  Please describe any probable cause for these symptoms. : NA

## 2023-05-23 NOTE — PROGRESS NOTES
SURGERY  Larry Kwon   1950 05/24/23    Chief Complaint: Hyperparathyroidism    HPI    Mr. Kwon is a very nice 73 y.o. male referred by Dr. Beverly with hypercalcemia and hyper parathyroidism, primary.  He's here with his wife of 40 years, they obviously having a great relationship.  He has had kidney stones, with procedures twice and one stone still remaining.  He does have hypertension on Cozaar and amlodipine.  DEXA scan has been ordered and is scheduled for Rafaela 15..  SPECT-CT scan with areas of activity both in the left lower neck and right thyroid, the left lower appearing more notable to me, very posterior.  Other symptoms of fatigue and muscle aches.    His vit D is 38.4, ionized calcium 5.7 (4.5-5.6), intact PTH 77.  Alk phos 132.      Family history without parathyroid problems that were known, they dieing early from lung cancer having smoked.     Past Medical History:   Diagnosis Date   • Allergic rhinitis    • Arthropathy of cervical spine    • BPH (benign prostatic hyperplasia)    • Callus of toe     LEFT GREAT TOE   • Colon polyp at age 48    reviewed every 3 yrs   • Diverticulosis of large intestine without perforation or abscess without bleeding    • Dyslipidemia    • Dysthymia    • Elevated cholesterol    • Glaucoma     BILATERAL   • Gout    • Hypertension    • Irritability    • Lumbago     INTERMITTENT   • Meralgia paresthetica, left lower limb    • Ocular hypertension     BORDERLINE   • Pain in right knee    • PONV (postoperative nausea and vomiting) ?    One incident years ago   • Primary hyperparathyroidism 05/19/2023   • Radiculopathy of leg     LEFT   • Radiculopathy, cervical     LEFT SIDED   • RBBB    • Right nephrolithiasis    • Urinary calculi    • Vitamin D deficiency 2016    Dr. Lassiter prescribes.     Past Surgical History:   Procedure Laterality Date   • APPENDECTOMY      5 YEARS OLD   • BACK SURGERY      x3   • CERVICAL FUSION N/A 11/26/2013   • COLONOSCOPY N/A 01/04/2013    Normal  ileum, two 3-5 mm polyps in the ascending colon-Dr. Pavan Sauceda   • COLONOSCOPY N/A 2021    Procedure: COLONOSCOPY TO CECUM AND TERM ILEUM WITH SALINE LIFT AND HOT SNARE POLYPECTOMIES;  Surgeon: Pavan Sauceda MD;  [facility] ENDOSCOPY;  Service: Gastroenterology;  Laterality: N/A;  PRE OP - PERS H/O POLYPS  POST OP - COLON POLYPS   • EXTRACORPOREAL SHOCK WAVE LITHOTRIPSY (ESWL)      multiple   • HERNIA REPAIR     • INGUINAL HERNIA REPAIR Bilateral    • LUMBAR DISCECTOMY      X2 ,   • PROSTATE BIOPSY  2019   • PROSTATE SURGERY  2015    TUNA PROCEDURE   • URETERAL STENT INSERTION Right 2014    WITH BASKET STONE EXTRACTION     Family History   Problem Relation Age of Onset   • Pancreatic cancer Mother    • Cancer Mother            • Suicidality Brother    • Hypertension Maternal Uncle            • Hypertension Maternal Uncle            • Diabetes Maternal Uncle            • Obesity Maternal Uncle              Social History     Socioeconomic History   • Marital status:    Tobacco Use   • Smoking status: Never   • Smokeless tobacco: Never   Vaping Use   • Vaping Use: Never used   Substance and Sexual Activity   • Alcohol use: Yes     Alcohol/week: 2.0 - 3.0 standard drinks     Types: 2 - 3 Glasses of wine per week     Comment: rarely   • Drug use: Never   • Sexual activity: Yes     Partners: Female     Birth control/protection: Vasectomy         Current Outpatient Medications:   •  allopurinol (ZYLOPRIM) 100 MG tablet, Take 1 tablet by mouth Daily., Disp: , Rfl:   •  amLODIPine (NORVASC) 5 MG tablet, Take 1 tablet by mouth Daily., Disp: , Rfl:   •  atorvastatin (LIPITOR) 20 MG tablet, Take 1 tablet by mouth Daily., Disp: , Rfl:   •  BinaxNOW COVID-19 Ag Home Test kit, TEST AS DIRECTED TODAY, Disp: , Rfl:   •  cholecalciferol (VITAMIN D3) 25 MCG (1000 UT) tablet, , Disp: , Rfl:   •  HYDROcodone-acetaminophen (NORCO) 7.5-325 MG per  "tablet, Take 1 tablet by mouth 2 (Two) Times a Day., Disp: 60 tablet, Rfl: 0  •  latanoprost (XALATAN) 0.005 % ophthalmic solution, , Disp: , Rfl:   •  losartan (COZAAR) 100 MG tablet, , Disp: , Rfl:   •  montelukast (SINGULAIR) 10 MG tablet, Take 1 tablet by mouth Every Night., Disp: , Rfl:   •  tamsulosin (FLOMAX) 0.4 MG capsule 24 hr capsule, Take 1 capsule by mouth Daily., Disp: , Rfl:     Allergies   Allergen Reactions   • Ace Inhibitors Cough       PHYSICAL EXAM:    /80 (BP Location: Left arm, Patient Position: Sitting, Cuff Size: Adult)   Ht 175.3 cm (69\")   Wt 86.2 kg (190 lb)   BMI 28.06 kg/m²   Body mass index is 28.06 kg/m².    Constitutional: well developed, well nourished, appears  healthy, younger than stated age  ENMT: Hearing intact, neck stiff with prior right cervical fusion  CVS: RRR, no murmur  Respiratory: CTA, normal respiratory effort   Gastrointestinal: abdomen soft  Musculoskeletal: gait normal, muscle mass normal  Neurological: awake and alert, seems to have reasonable capacity for understanding for medical decision making  Psychiatric: appears to have reasonable judgement, pleasant    Radiographic/Lab Findings:       Reviewed: SPECT CT with patient and the parathyroid pamphlet.     IMPRESSION:  · Hyperparathyroidism  · Hypercalcemia (ionized calcium)  · Metabolic bone disease with alk phos 132.  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    PLAN:  · Ultrasound of the thyroid and neck.  This will be needed to assess if there is a mass within the thyroid.  · Left inferior parathyroid adenoma resection, right exploration.  · Injection day of procedure  The risk of parathyroid surgery were discussed with the patient including bleeding, infection, " recurrent laryngeal nerve injury, hypocalcemia potentially necessitating temporary or permanent supplementation with calcium and/or activated vitamin D, with repeated labs.  We also discussed the accuracy rate of pre op studies not being ideal and the potential that the affected gland may not be located and hypercalcemia may remain.  Of course, scarring will be present.  · We discussed the intended outpatient nature of this procedure but if the gland cannot be found in the expected location, and/or bilateral exploration is need, there is the possibility of the need for an overnight stay.  In this case, supplementation of a more complex nature will be more likely and for a more extended period.      Cecilia Gil MD  13:54 EDT      In order to provide a more personal and interactive patient experience as well as improve efficiency, this note was started prior to the office visit, including review of past history and pertinent images, surgeries.

## 2023-05-23 NOTE — H&P (VIEW-ONLY)
SURGERY  Larry Kwon   1950 05/24/23    Chief Complaint: Hyperparathyroidism    HPI    Mr. Kwon is a very nice 73 y.o. male referred by Dr. Beverly with hypercalcemia and hyper parathyroidism, primary.  He's here with his wife of 40 years, they obviously having a great relationship.  He has had kidney stones, with procedures twice and one stone still remaining.  He does have hypertension on Cozaar and amlodipine.  DEXA scan has been ordered and is scheduled for Rafaela 15..  SPECT-CT scan with areas of activity both in the left lower neck and right thyroid, the left lower appearing more notable to me, very posterior.  Other symptoms of fatigue and muscle aches.    His vit D is 38.4, ionized calcium 5.7 (4.5-5.6), intact PTH 77.  Alk phos 132.      Family history without parathyroid problems that were known, they dieing early from lung cancer having smoked.     Past Medical History:   Diagnosis Date   • Allergic rhinitis    • Arthropathy of cervical spine    • BPH (benign prostatic hyperplasia)    • Callus of toe     LEFT GREAT TOE   • Colon polyp at age 48    reviewed every 3 yrs   • Diverticulosis of large intestine without perforation or abscess without bleeding    • Dyslipidemia    • Dysthymia    • Elevated cholesterol    • Glaucoma     BILATERAL   • Gout    • Hypertension    • Irritability    • Lumbago     INTERMITTENT   • Meralgia paresthetica, left lower limb    • Ocular hypertension     BORDERLINE   • Pain in right knee    • PONV (postoperative nausea and vomiting) ?    One incident years ago   • Primary hyperparathyroidism 05/19/2023   • Radiculopathy of leg     LEFT   • Radiculopathy, cervical     LEFT SIDED   • RBBB    • Right nephrolithiasis    • Urinary calculi    • Vitamin D deficiency 2016    Dr. Lassiter prescribes.     Past Surgical History:   Procedure Laterality Date   • APPENDECTOMY      5 YEARS OLD   • BACK SURGERY      x3   • CERVICAL FUSION N/A 11/26/2013   • COLONOSCOPY N/A 01/04/2013    Normal  ileum, two 3-5 mm polyps in the ascending colon-Dr. Pavan Sauceda   • COLONOSCOPY N/A 2021    Procedure: COLONOSCOPY TO CECUM AND TERM ILEUM WITH SALINE LIFT AND HOT SNARE POLYPECTOMIES;  Surgeon: Pavan Sauceda MD;  Location: [facility];  Service: Gastroenterology;  Laterality: N/A;  PRE OP - PERS H/O POLYPS  POST OP - COLON POLYPS   • EXTRACORPOREAL SHOCK WAVE LITHOTRIPSY (ESWL)      multiple   • HERNIA REPAIR     • INGUINAL HERNIA REPAIR Bilateral    • LUMBAR DISCECTOMY      X2 ,   • PROSTATE BIOPSY  2019   • PROSTATE SURGERY  2015    TUNA PROCEDURE   • URETERAL STENT INSERTION Right 2014    WITH BASKET STONE EXTRACTION     Family History   Problem Relation Age of Onset   • Pancreatic cancer Mother    • Cancer Mother            • Suicidality Brother    • Hypertension Maternal Uncle            • Hypertension Maternal Uncle            • Diabetes Maternal Uncle            • Obesity Maternal Uncle              Social History     Socioeconomic History   • Marital status:    Tobacco Use   • Smoking status: Never   • Smokeless tobacco: Never   Vaping Use   • Vaping Use: Never used   Substance and Sexual Activity   • Alcohol use: Yes     Alcohol/week: 2.0 - 3.0 standard drinks     Types: 2 - 3 Glasses of wine per week     Comment: rarely   • Drug use: Never   • Sexual activity: Yes     Partners: Female     Birth control/protection: Vasectomy         Current Outpatient Medications:   •  allopurinol (ZYLOPRIM) 100 MG tablet, Take 1 tablet by mouth Daily., Disp: , Rfl:   •  amLODIPine (NORVASC) 5 MG tablet, Take 1 tablet by mouth Daily., Disp: , Rfl:   •  atorvastatin (LIPITOR) 20 MG tablet, Take 1 tablet by mouth Daily., Disp: , Rfl:   •  BinaxNOW COVID-19 Ag Home Test kit, TEST AS DIRECTED TODAY, Disp: , Rfl:   •  cholecalciferol (VITAMIN D3) 25 MCG (1000 UT) tablet, , Disp: , Rfl:   •  HYDROcodone-acetaminophen (NORCO) 7.5-325 MG per  "tablet, Take 1 tablet by mouth 2 (Two) Times a Day., Disp: 60 tablet, Rfl: 0  •  latanoprost (XALATAN) 0.005 % ophthalmic solution, , Disp: , Rfl:   •  losartan (COZAAR) 100 MG tablet, , Disp: , Rfl:   •  montelukast (SINGULAIR) 10 MG tablet, Take 1 tablet by mouth Every Night., Disp: , Rfl:   •  tamsulosin (FLOMAX) 0.4 MG capsule 24 hr capsule, Take 1 capsule by mouth Daily., Disp: , Rfl:     Allergies   Allergen Reactions   • Ace Inhibitors Cough       PHYSICAL EXAM:    /80 (BP Location: Left arm, Patient Position: Sitting, Cuff Size: Adult)   Ht 175.3 cm (69\")   Wt 86.2 kg (190 lb)   BMI 28.06 kg/m²   Body mass index is 28.06 kg/m².    Constitutional: well developed, well nourished, appears  healthy, younger than stated age  ENMT: Hearing intact, neck stiff with prior right cervical fusion  CVS: RRR, no murmur  Respiratory: CTA, normal respiratory effort   Gastrointestinal: abdomen soft  Musculoskeletal: gait normal, muscle mass normal  Neurological: awake and alert, seems to have reasonable capacity for understanding for medical decision making  Psychiatric: appears to have reasonable judgement, pleasant    Radiographic/Lab Findings:       Reviewed: SPECT CT with patient and the parathyroid pamphlet.     IMPRESSION:  · Hyperparathyroidism  · Hypercalcemia (ionized calcium)  · Metabolic bone disease with alk phos 132.  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    PLAN:  · Ultrasound of the thyroid and neck.  This will be needed to assess if there is a mass within the thyroid.  · Left inferior parathyroid adenoma resection, right exploration.  · Injection day of procedure  The risk of parathyroid surgery were discussed with the patient including bleeding, infection, " recurrent laryngeal nerve injury, hypocalcemia potentially necessitating temporary or permanent supplementation with calcium and/or activated vitamin D, with repeated labs.  We also discussed the accuracy rate of pre op studies not being ideal and the potential that the affected gland may not be located and hypercalcemia may remain.  Of course, scarring will be present.  · We discussed the intended outpatient nature of this procedure but if the gland cannot be found in the expected location, and/or bilateral exploration is need, there is the possibility of the need for an overnight stay.  In this case, supplementation of a more complex nature will be more likely and for a more extended period.      Cecilia Gil MD  13:54 EDT      In order to provide a more personal and interactive patient experience as well as improve efficiency, this note was started prior to the office visit, including review of past history and pertinent images, surgeries.    ADDEND  US read as having right intrathryoidal parathyroid.  I am not sure of that, with the entity appearing anterior.        Left US not helpful, but by SPECT CT, would be very posterior.  I think i will need to evaluate very closely with neoprobe in the OR and evaluate deeply with US prior to beginning, but suspect a bilateral exploration will be necessary.  Case request already in.   05/31/23  09:53 EDT

## 2023-05-24 ENCOUNTER — PREP FOR SURGERY (OUTPATIENT)
Dept: OTHER | Facility: HOSPITAL | Age: 73
End: 2023-05-24
Payer: MEDICARE

## 2023-05-24 ENCOUNTER — OFFICE VISIT (OUTPATIENT)
Dept: SURGERY | Facility: CLINIC | Age: 73
End: 2023-05-24
Payer: MEDICARE

## 2023-05-24 VITALS
BODY MASS INDEX: 28.14 KG/M2 | DIASTOLIC BLOOD PRESSURE: 80 MMHG | SYSTOLIC BLOOD PRESSURE: 130 MMHG | WEIGHT: 190 LBS | HEIGHT: 69 IN

## 2023-05-24 DIAGNOSIS — N20.0 KIDNEY STONES: ICD-10-CM

## 2023-05-24 DIAGNOSIS — E21.0 PRIMARY HYPERPARATHYROIDISM: Primary | ICD-10-CM

## 2023-05-24 DIAGNOSIS — E83.52 HYPERCALCEMIA: ICD-10-CM

## 2023-05-24 RX ORDER — SODIUM CHLORIDE 9 MG/ML
40 INJECTION, SOLUTION INTRAVENOUS AS NEEDED
OUTPATIENT
Start: 2023-06-07

## 2023-05-24 RX ORDER — OXYCODONE HCL 10 MG/1
10 TABLET, FILM COATED, EXTENDED RELEASE ORAL ONCE
OUTPATIENT
Start: 2023-06-07 | End: 2023-05-24

## 2023-05-24 RX ORDER — ACETAMINOPHEN 500 MG
1000 TABLET ORAL ONCE
OUTPATIENT
Start: 2023-06-07 | End: 2023-05-24

## 2023-05-24 RX ORDER — ONDANSETRON 2 MG/ML
4 INJECTION INTRAMUSCULAR; INTRAVENOUS EVERY 6 HOURS PRN
OUTPATIENT
Start: 2023-05-24

## 2023-05-24 RX ORDER — SODIUM CHLORIDE 0.9 % (FLUSH) 0.9 %
1-10 SYRINGE (ML) INJECTION AS NEEDED
OUTPATIENT
Start: 2023-06-07

## 2023-05-24 RX ORDER — SODIUM CHLORIDE 0.9 % (FLUSH) 0.9 %
3 SYRINGE (ML) INJECTION EVERY 12 HOURS SCHEDULED
OUTPATIENT
Start: 2023-06-07

## 2023-05-24 RX ORDER — CEFAZOLIN SODIUM 2 G/100ML
2 INJECTION, SOLUTION INTRAVENOUS ONCE
OUTPATIENT
Start: 2023-06-07 | End: 2023-05-24

## 2023-05-26 ENCOUNTER — PATIENT ROUNDING (BHMG ONLY) (OUTPATIENT)
Dept: SURGERY | Facility: CLINIC | Age: 73
End: 2023-05-26
Payer: MEDICARE

## 2023-05-26 NOTE — PROGRESS NOTES
May 26, 2023      I am calling to officially welcome you to our practice and ask about your recent visit. Is this a good time to talk? No. Left voicemail to call back.

## 2023-05-31 ENCOUNTER — HOSPITAL ENCOUNTER (OUTPATIENT)
Dept: ULTRASOUND IMAGING | Facility: HOSPITAL | Age: 73
Discharge: HOME | End: 2023-05-31

## 2023-05-31 DIAGNOSIS — E21.0 PRIMARY HYPERPARATHYROIDISM: ICD-10-CM

## 2023-05-31 DIAGNOSIS — E83.52 HYPERCALCEMIA: ICD-10-CM

## 2023-05-31 PROCEDURE — 76536 US EXAM OF HEAD AND NECK: CPT

## 2023-06-02 ENCOUNTER — PRE-ADMISSION TESTING (OUTPATIENT)
Dept: PREADMISSION TESTING | Facility: HOSPITAL | Age: 73
End: 2023-06-02
Payer: MEDICARE

## 2023-06-02 VITALS
HEIGHT: 68 IN | BODY MASS INDEX: 28.02 KG/M2 | RESPIRATION RATE: 16 BRPM | HEART RATE: 87 BPM | OXYGEN SATURATION: 96 % | WEIGHT: 184.9 LBS | TEMPERATURE: 98.1 F | DIASTOLIC BLOOD PRESSURE: 73 MMHG | SYSTOLIC BLOOD PRESSURE: 131 MMHG

## 2023-06-02 DIAGNOSIS — N20.0 KIDNEY STONES: ICD-10-CM

## 2023-06-02 DIAGNOSIS — E21.0 PRIMARY HYPERPARATHYROIDISM: ICD-10-CM

## 2023-06-02 DIAGNOSIS — E83.52 HYPERCALCEMIA: ICD-10-CM

## 2023-06-02 LAB
ANION GAP SERPL CALCULATED.3IONS-SCNC: 10.2 MMOL/L (ref 5–15)
BUN SERPL-MCNC: 11 MG/DL (ref 8–23)
BUN/CREAT SERPL: 10.3 (ref 7–25)
CALCIUM SPEC-SCNC: 10.5 MG/DL (ref 8.6–10.5)
CHLORIDE SERPL-SCNC: 105 MMOL/L (ref 98–107)
CO2 SERPL-SCNC: 25.8 MMOL/L (ref 22–29)
CREAT SERPL-MCNC: 1.07 MG/DL (ref 0.76–1.27)
DEPRECATED RDW RBC AUTO: 43 FL (ref 37–54)
EGFRCR SERPLBLD CKD-EPI 2021: 72.4 ML/MIN/1.73
ERYTHROCYTE [DISTWIDTH] IN BLOOD BY AUTOMATED COUNT: 12.6 % (ref 12.3–15.4)
GLUCOSE SERPL-MCNC: 97 MG/DL (ref 65–99)
HCT VFR BLD AUTO: 44.2 % (ref 37.5–51)
HGB BLD-MCNC: 14.6 G/DL (ref 13–17.7)
MCH RBC QN AUTO: 30.7 PG (ref 26.6–33)
MCHC RBC AUTO-ENTMCNC: 33 G/DL (ref 31.5–35.7)
MCV RBC AUTO: 92.9 FL (ref 79–97)
PHOSPHATE SERPL-MCNC: 2.9 MG/DL (ref 2.5–4.5)
PLATELET # BLD AUTO: 204 10*3/MM3 (ref 140–450)
PMV BLD AUTO: 10.8 FL (ref 6–12)
POTASSIUM SERPL-SCNC: 3.9 MMOL/L (ref 3.5–5.2)
QT INTERVAL: 393 MS
RBC # BLD AUTO: 4.76 10*6/MM3 (ref 4.14–5.8)
SODIUM SERPL-SCNC: 141 MMOL/L (ref 136–145)
WBC NRBC COR # BLD: 5.32 10*3/MM3 (ref 3.4–10.8)

## 2023-06-02 PROCEDURE — 80048 BASIC METABOLIC PNL TOTAL CA: CPT

## 2023-06-02 PROCEDURE — 85027 COMPLETE CBC AUTOMATED: CPT

## 2023-06-02 PROCEDURE — 84100 ASSAY OF PHOSPHORUS: CPT

## 2023-06-02 PROCEDURE — 93005 ELECTROCARDIOGRAM TRACING: CPT

## 2023-06-02 PROCEDURE — 36415 COLL VENOUS BLD VENIPUNCTURE: CPT

## 2023-06-02 RX ORDER — CHLORHEXIDINE GLUCONATE 500 MG/1
CLOTH TOPICAL
COMMUNITY
End: 2023-06-07 | Stop reason: HOSPADM

## 2023-06-02 NOTE — DISCHARGE INSTRUCTIONS
Take the following medications the morning of surgery:    AMLODIPINE  HYDROCODONE    ARRIVE AT 0730.    If you are on prescription narcotic pain medication to control your pain you may also take that medication the morning of surgery.    General Instructions:  Do not eat solid food after midnight the night before surgery.  You may drink clear liquids day of surgery but must stop at least one hour before your hospital arrival time.  It is beneficial for you to have a clear drink that contains carbohydrates the day of surgery.  We suggest a 12 to 20 ounce bottle of Gatorade or Powerade for non-diabetic patients or a 12 to 20 ounce bottle of G2 or Powerade Zero for diabetic patients. (Pediatric patients, are not advised to drink a 12 to 20 ounce carbohydrate drink)    Clear liquids are liquids you can see through.  Nothing red in color.     Plain water                               Sports drinks  Sodas                                   Gelatin (Jell-O)  Fruit juices without pulp such as white grape juice and apple juice  Popsicles that contain no fruit or yogurt  Tea or coffee (no cream or milk added)  Gatorade / Powerade  G2 / Powerade Zero    Infants may have breast milk up to four hours before surgery.  Infants drinking formula may drink formula up to six hours before surgery.   Patients who avoid smoking, chewing tobacco and alcohol for 4 weeks prior to surgery have a reduced risk of post-operative complications.  Quit smoking as many days before surgery as you can.  Do not smoke, use chewing tobacco or drink alcohol the day of surgery.   If applicable bring your C-PAP/ BI-PAP machine in with you to preop day of surgery.  Bring any papers given to you in the doctor’s office.  Wear clean comfortable clothes.  Do not wear contact lenses, false eyelashes or make-up.  Bring a case for your glasses.   Bring crutches or walker if applicable.  Remove all piercings.  Leave jewelry and any other valuables at home.  Hair  extensions with metal clips must be removed prior to surgery.  The Pre-Admission Testing nurse will instruct you to bring medications if unable to obtain an accurate list in Pre-Admission Testing.        If you were given a blood bank ID arm band remember to bring it with you the day of surgery.    Preventing a Surgical Site Infection:  For 2 to 3 days before surgery, avoid shaving with a razor because the razor can irritate skin and make it easier to develop an infection.    Any areas of open skin can increase the risk of a post-operative wound infection by allowing bacteria to enter and travel throughout the body.  Notify your surgeon if you have any skin wounds / rashes even if it is not near the expected surgical site.  The area will need assessed to determine if surgery should be delayed until it is healed.  The night prior to surgery shower using a fresh bar of anti-bacterial soap (such as Dial) and clean washcloth.  Sleep in a clean bed with clean clothing.  Do not allow pets to sleep with you.  Shower on the morning of surgery using a fresh bar of anti-bacterial soap (such as Dial) and clean washcloth.  Dry with a clean towel and dress in clean clothing.  Ask your surgeon if you will be receiving antibiotics prior to surgery.  Make sure you, your family, and all healthcare providers clean their hands with soap and water or an alcohol based hand  before caring for you or your wound.    Day of surgery:  Your arrival time is approximately two hours before your scheduled surgery time.  Upon arrival, a Pre-op nurse and Anesthesiologist will review your health history, obtain vital signs, and answer questions you may have.  The only belongings needed at this time will be a list of your home medications and if applicable your C-PAP/BI-PAP machine.  A Pre-op nurse will start an IV and you may receive medication in preparation for surgery, including something to help you relax.     Please be aware that  surgery does come with discomfort.  We want to make every effort to control your discomfort so please discuss any uncontrolled symptoms with your nurse.   Your doctor will most likely have prescribed pain medications.      If you are going home after surgery you will receive individualized written care instructions before being discharged.  A responsible adult must drive you to and from the hospital on the day of your surgery and stay with you for 24 hours.  Discharge prescriptions can be filled by the hospital pharmacy during regular pharmacy hours.  If you are having surgery late in the day/evening your prescription may be e-prescribed to your pharmacy.  Please verify your pharmacy hours or chose a 24 hour pharmacy to avoid not having access to your prescription because your pharmacy has closed for the day.    If you are staying overnight following surgery, you will be transported to your hospital room following the recovery period.  [facility] has all private rooms.    If you have any questions please call Pre-Admission Testing at [phone].  Deductibles and co-payments are collected on the day of service. Please be prepared to pay the required co-pay, deductible or deposit on the day of service as defined by your plan.    Call your surgeon immediately if you experience any of the following symptoms:  Sore Throat  Shortness of Breath or difficulty breathing  Cough  Chills  Body soreness or muscle pain  Headache  Fever  New loss of taste or smell  Do not arrive for your surgery ill.  Your procedure will need to be rescheduled to another time.  You will need to call your physician before the day of surgery to avoid any unnecessary exposure to hospital staff as well as other patients.      CHLORHEXIDINE CLOTH INSTRUCTIONS  The morning of surgery follow these instructions using the Chlorhexidine cloths you've been given.  These steps reduce bacteria on the body.  Do not use the cloths near your  eyes, ears mouth, genitalia or on open wounds.  Throw the cloths away after use but do not try to flush them down a toilet.      Open and remove one cloth at a time from the package.    Leave the cloth unfolded and begin the bathing.  Massage the skin with the cloths using gentle pressure to remove bacteria.  Do not scrub harshly.   Follow the steps below with one 2% CHG cloth per area (6 total cloths).  One cloth for neck, shoulders and chest.  One cloth for both arms, hands, fingers and underarms (do underarms last).  One cloth for the abdomen followed by groin.  One cloth for right leg and foot including between the toes.  One cloth for left leg and foot including between the toes.  The last cloth is to be used for the back of the neck, back and buttocks.    Allow the CHG to air dry 3 minutes on the skin which will give it time to work and decrease the chance of irritation.  The skin may feel sticky until it is dry.  Do not rinse with water or any other liquid or you will lose the beneficial effects of the CHG.  If mild skin irritation occurs, do rinse the skin to remove the CHG.  Report this to the nurse at time of admission.  Do not apply lotions, creams, ointments, deodorants or perfumes after using the clothes. Dress in clean clothes before coming to the hospital.

## 2023-06-07 ENCOUNTER — ANESTHESIA EVENT (OUTPATIENT)
Dept: PERIOP | Facility: HOSPITAL | Age: 73
End: 2023-06-07
Payer: MEDICARE

## 2023-06-07 ENCOUNTER — ANESTHESIA (OUTPATIENT)
Dept: PERIOP | Facility: HOSPITAL | Age: 73
End: 2023-06-07
Payer: MEDICARE

## 2023-06-07 ENCOUNTER — HOSPITAL ENCOUNTER (OUTPATIENT)
Facility: HOSPITAL | Age: 73
Setting detail: HOSPITAL OUTPATIENT SURGERY
Discharge: HOME | End: 2023-06-07
Attending: SURGERY | Admitting: SURGERY
Payer: MEDICARE

## 2023-06-07 ENCOUNTER — HOSPITAL ENCOUNTER (OUTPATIENT)
Dept: NUCLEAR MEDICINE | Facility: HOSPITAL | Age: 73
Discharge: HOME | End: 2023-06-07
Payer: MEDICARE

## 2023-06-07 VITALS
TEMPERATURE: 97.6 F | HEIGHT: 68 IN | OXYGEN SATURATION: 93 % | DIASTOLIC BLOOD PRESSURE: 69 MMHG | BODY MASS INDEX: 28 KG/M2 | WEIGHT: 184.75 LBS | SYSTOLIC BLOOD PRESSURE: 135 MMHG | RESPIRATION RATE: 16 BRPM | HEART RATE: 86 BPM

## 2023-06-07 DIAGNOSIS — E21.0 PRIMARY HYPERPARATHYROIDISM: ICD-10-CM

## 2023-06-07 DIAGNOSIS — N20.0 KIDNEY STONES: ICD-10-CM

## 2023-06-07 DIAGNOSIS — E83.52 HYPERCALCEMIA: ICD-10-CM

## 2023-06-07 LAB
PTH-INTACT SERPL-SCNC: 32.1 PG/ML (ref 15–65)
PTH-INTACT SERPL-SCNC: 39.8 PG/ML (ref 15–65)
PTH-INTACT SERPL-SCNC: 66 PG/ML (ref 15–65)

## 2023-06-07 PROCEDURE — 25010000002 CEFAZOLIN IN DEXTROSE 2-4 GM/100ML-% SOLUTION: Performed by: SURGERY

## 2023-06-07 PROCEDURE — 78808 IV INJ RA DRUG DX STUDY: CPT

## 2023-06-07 PROCEDURE — 88331 PATH CONSLTJ SURG 1 BLK 1SPC: CPT | Performed by: SURGERY

## 2023-06-07 PROCEDURE — 60500 EXPLORE PARATHYROID GLANDS: CPT | Performed by: SURGERY

## 2023-06-07 PROCEDURE — 60500 EXPLORE PARATHYROID GLANDS: CPT | Performed by: PHYSICIAN ASSISTANT

## 2023-06-07 PROCEDURE — 0 TECHNETIUM SESTAMIBI: Performed by: SURGERY

## 2023-06-07 PROCEDURE — 88305 TISSUE EXAM BY PATHOLOGIST: CPT | Performed by: SURGERY

## 2023-06-07 PROCEDURE — 25010000002 ONDANSETRON PER 1 MG

## 2023-06-07 PROCEDURE — 25010000002 HYDROMORPHONE PER 4 MG

## 2023-06-07 PROCEDURE — 25010000002 PROPOFOL 10 MG/ML EMULSION

## 2023-06-07 PROCEDURE — 25010000002 SUGAMMADEX 200 MG/2ML SOLUTION

## 2023-06-07 PROCEDURE — A9500 TC99M SESTAMIBI: HCPCS | Performed by: SURGERY

## 2023-06-07 PROCEDURE — 83970 ASSAY OF PARATHORMONE: CPT | Performed by: SURGERY

## 2023-06-07 PROCEDURE — 25010000002 FENTANYL CITRATE (PF) 50 MCG/ML SOLUTION

## 2023-06-07 DEVICE — CLIP LIGAT VASC HORIZON TI MD BLU 24CT: Type: IMPLANTABLE DEVICE | Site: PARATHYROID | Status: FUNCTIONAL

## 2023-06-07 DEVICE — CLIP LIGAT VASC HORIZON TI SM/WD RED 24CT: Type: IMPLANTABLE DEVICE | Site: PARATHYROID | Status: FUNCTIONAL

## 2023-06-07 RX ORDER — CALCIUM CARBONATE 500 MG/1
1 TABLET, CHEWABLE ORAL DAILY
Qty: 30 TABLET | Refills: 0 | Status: SHIPPED | OUTPATIENT
Start: 2023-06-07 | End: 2023-07-07

## 2023-06-07 RX ORDER — PROPOFOL 10 MG/ML
VIAL (ML) INTRAVENOUS AS NEEDED
Status: DISCONTINUED | OUTPATIENT
Start: 2023-06-07 | End: 2023-06-07 | Stop reason: SURG

## 2023-06-07 RX ORDER — HYDRALAZINE HYDROCHLORIDE 20 MG/ML
5 INJECTION INTRAMUSCULAR; INTRAVENOUS
Status: DISCONTINUED | OUTPATIENT
Start: 2023-06-07 | End: 2023-06-07 | Stop reason: HOSPADM

## 2023-06-07 RX ORDER — ONDANSETRON 2 MG/ML
INJECTION INTRAMUSCULAR; INTRAVENOUS AS NEEDED
Status: DISCONTINUED | OUTPATIENT
Start: 2023-06-07 | End: 2023-06-07 | Stop reason: SURG

## 2023-06-07 RX ORDER — HYDROCODONE BITARTRATE AND ACETAMINOPHEN 5; 325 MG/1; MG/1
1 TABLET ORAL EVERY 4 HOURS PRN
Qty: 10 TABLET | Refills: 0 | Status: SHIPPED | OUTPATIENT
Start: 2023-06-07 | End: 2023-06-19

## 2023-06-07 RX ORDER — EPHEDRINE SULFATE 50 MG/ML
INJECTION INTRAVENOUS AS NEEDED
Status: DISCONTINUED | OUTPATIENT
Start: 2023-06-07 | End: 2023-06-07 | Stop reason: SURG

## 2023-06-07 RX ORDER — ACETAMINOPHEN 500 MG
1000 TABLET ORAL ONCE
Status: COMPLETED | OUTPATIENT
Start: 2023-06-07 | End: 2023-06-07

## 2023-06-07 RX ORDER — HYDROMORPHONE HYDROCHLORIDE 1 MG/ML
0.25 INJECTION, SOLUTION INTRAMUSCULAR; INTRAVENOUS; SUBCUTANEOUS
Status: DISCONTINUED | OUTPATIENT
Start: 2023-06-07 | End: 2023-06-07 | Stop reason: HOSPADM

## 2023-06-07 RX ORDER — FENTANYL CITRATE 50 UG/ML
50 INJECTION, SOLUTION INTRAMUSCULAR; INTRAVENOUS ONCE AS NEEDED
Status: DISCONTINUED | OUTPATIENT
Start: 2023-06-07 | End: 2023-06-07 | Stop reason: HOSPADM

## 2023-06-07 RX ORDER — OXYCODONE HCL 10 MG/1
10 TABLET, FILM COATED, EXTENDED RELEASE ORAL ONCE
Status: COMPLETED | OUTPATIENT
Start: 2023-06-07 | End: 2023-06-07

## 2023-06-07 RX ORDER — SODIUM CHLORIDE 0.9 % (FLUSH) 0.9 %
3 SYRINGE (ML) INJECTION EVERY 12 HOURS SCHEDULED
Status: DISCONTINUED | OUTPATIENT
Start: 2023-06-07 | End: 2023-06-07 | Stop reason: HOSPADM

## 2023-06-07 RX ORDER — ONDANSETRON 2 MG/ML
4 INJECTION INTRAMUSCULAR; INTRAVENOUS ONCE AS NEEDED
Status: DISCONTINUED | OUTPATIENT
Start: 2023-06-07 | End: 2023-06-07 | Stop reason: HOSPADM

## 2023-06-07 RX ORDER — HYDROCODONE BITARTRATE AND ACETAMINOPHEN 5; 325 MG/1; MG/1
1 TABLET ORAL ONCE AS NEEDED
Status: COMPLETED | OUTPATIENT
Start: 2023-06-07 | End: 2023-06-07

## 2023-06-07 RX ORDER — CEFAZOLIN SODIUM 2 G/100ML
2 INJECTION, SOLUTION INTRAVENOUS ONCE
Status: COMPLETED | OUTPATIENT
Start: 2023-06-07 | End: 2023-06-07

## 2023-06-07 RX ORDER — FENTANYL CITRATE 50 UG/ML
25 INJECTION, SOLUTION INTRAMUSCULAR; INTRAVENOUS
Status: DISCONTINUED | OUTPATIENT
Start: 2023-06-07 | End: 2023-06-07 | Stop reason: HOSPADM

## 2023-06-07 RX ORDER — SODIUM CHLORIDE, SODIUM LACTATE, POTASSIUM CHLORIDE, CALCIUM CHLORIDE 600; 310; 30; 20 MG/100ML; MG/100ML; MG/100ML; MG/100ML
9 INJECTION, SOLUTION INTRAVENOUS CONTINUOUS
Status: DISCONTINUED | OUTPATIENT
Start: 2023-06-07 | End: 2023-06-07 | Stop reason: HOSPADM

## 2023-06-07 RX ORDER — BUPIVACAINE HYDROCHLORIDE AND EPINEPHRINE 5; 5 MG/ML; UG/ML
INJECTION, SOLUTION EPIDURAL; INTRACAUDAL; PERINEURAL AS NEEDED
Status: DISCONTINUED | OUTPATIENT
Start: 2023-06-07 | End: 2023-06-07 | Stop reason: HOSPADM

## 2023-06-07 RX ORDER — ONDANSETRON 2 MG/ML
4 INJECTION INTRAMUSCULAR; INTRAVENOUS EVERY 6 HOURS PRN
Status: DISCONTINUED | OUTPATIENT
Start: 2023-06-07 | End: 2023-06-07 | Stop reason: HOSPADM

## 2023-06-07 RX ORDER — FENTANYL CITRATE 0.05 MG/ML
INJECTION, SOLUTION INTRAMUSCULAR; INTRAVENOUS AS NEEDED
Status: DISCONTINUED | OUTPATIENT
Start: 2023-06-07 | End: 2023-06-07 | Stop reason: SURG

## 2023-06-07 RX ORDER — DIPHENHYDRAMINE HYDROCHLORIDE 50 MG/ML
12.5 INJECTION INTRAMUSCULAR; INTRAVENOUS
Status: DISCONTINUED | OUTPATIENT
Start: 2023-06-07 | End: 2023-06-07 | Stop reason: HOSPADM

## 2023-06-07 RX ORDER — NALOXONE HCL 0.4 MG/ML
0.2 VIAL (ML) INJECTION AS NEEDED
Status: DISCONTINUED | OUTPATIENT
Start: 2023-06-07 | End: 2023-06-07 | Stop reason: HOSPADM

## 2023-06-07 RX ORDER — FLUMAZENIL 0.1 MG/ML
0.2 INJECTION INTRAVENOUS AS NEEDED
Status: DISCONTINUED | OUTPATIENT
Start: 2023-06-07 | End: 2023-06-07 | Stop reason: HOSPADM

## 2023-06-07 RX ORDER — ROCURONIUM BROMIDE 10 MG/ML
INJECTION, SOLUTION INTRAVENOUS AS NEEDED
Status: DISCONTINUED | OUTPATIENT
Start: 2023-06-07 | End: 2023-06-07 | Stop reason: SURG

## 2023-06-07 RX ORDER — SODIUM CHLORIDE 0.9 % (FLUSH) 0.9 %
1-10 SYRINGE (ML) INJECTION AS NEEDED
Status: DISCONTINUED | OUTPATIENT
Start: 2023-06-07 | End: 2023-06-07 | Stop reason: HOSPADM

## 2023-06-07 RX ORDER — SODIUM CHLORIDE 0.9 % (FLUSH) 0.9 %
3-10 SYRINGE (ML) INJECTION AS NEEDED
Status: DISCONTINUED | OUTPATIENT
Start: 2023-06-07 | End: 2023-06-07 | Stop reason: HOSPADM

## 2023-06-07 RX ORDER — EPHEDRINE SULFATE 50 MG/ML
5 INJECTION, SOLUTION INTRAVENOUS ONCE AS NEEDED
Status: DISCONTINUED | OUTPATIENT
Start: 2023-06-07 | End: 2023-06-07 | Stop reason: HOSPADM

## 2023-06-07 RX ORDER — FAMOTIDINE 10 MG/ML
20 INJECTION, SOLUTION INTRAVENOUS ONCE
Status: COMPLETED | OUTPATIENT
Start: 2023-06-07 | End: 2023-06-07

## 2023-06-07 RX ORDER — LIDOCAINE HYDROCHLORIDE 10 MG/ML
0.5 INJECTION, SOLUTION EPIDURAL; INFILTRATION; INTRACAUDAL; PERINEURAL ONCE AS NEEDED
Status: DISCONTINUED | OUTPATIENT
Start: 2023-06-07 | End: 2023-06-07 | Stop reason: HOSPADM

## 2023-06-07 RX ORDER — ONDANSETRON 4 MG/1
4 TABLET, FILM COATED ORAL EVERY 8 HOURS PRN
Qty: 10 TABLET | Refills: 0 | Status: SHIPPED | OUTPATIENT
Start: 2023-06-07 | End: 2023-06-19

## 2023-06-07 RX ORDER — SCOLOPAMINE TRANSDERMAL SYSTEM 1 MG/1
1 PATCH, EXTENDED RELEASE TRANSDERMAL ONCE
Status: DISCONTINUED | OUTPATIENT
Start: 2023-06-07 | End: 2023-06-07 | Stop reason: HOSPADM

## 2023-06-07 RX ORDER — LABETALOL HYDROCHLORIDE 5 MG/ML
5 INJECTION, SOLUTION INTRAVENOUS
Status: DISCONTINUED | OUTPATIENT
Start: 2023-06-07 | End: 2023-06-07 | Stop reason: HOSPADM

## 2023-06-07 RX ORDER — IPRATROPIUM BROMIDE AND ALBUTEROL SULFATE 2.5; .5 MG/3ML; MG/3ML
3 SOLUTION RESPIRATORY (INHALATION) ONCE AS NEEDED
Status: DISCONTINUED | OUTPATIENT
Start: 2023-06-07 | End: 2023-06-07 | Stop reason: HOSPADM

## 2023-06-07 RX ORDER — MIDAZOLAM HYDROCHLORIDE 1 MG/ML
0.5 INJECTION INTRAMUSCULAR; INTRAVENOUS
Status: DISCONTINUED | OUTPATIENT
Start: 2023-06-07 | End: 2023-06-07 | Stop reason: HOSPADM

## 2023-06-07 RX ORDER — HYDROCODONE BITARTRATE AND ACETAMINOPHEN 7.5; 325 MG/1; MG/1
1 TABLET ORAL EVERY 4 HOURS PRN
Status: DISCONTINUED | OUTPATIENT
Start: 2023-06-07 | End: 2023-06-07 | Stop reason: HOSPADM

## 2023-06-07 RX ORDER — SODIUM CHLORIDE 9 MG/ML
40 INJECTION, SOLUTION INTRAVENOUS AS NEEDED
Status: DISCONTINUED | OUTPATIENT
Start: 2023-06-07 | End: 2023-06-07 | Stop reason: HOSPADM

## 2023-06-07 RX ORDER — LIDOCAINE HYDROCHLORIDE 20 MG/ML
INJECTION, SOLUTION INFILTRATION; PERINEURAL AS NEEDED
Status: DISCONTINUED | OUTPATIENT
Start: 2023-06-07 | End: 2023-06-07 | Stop reason: SURG

## 2023-06-07 RX ORDER — CALCITRIOL 0.25 UG/1
0.25 CAPSULE, LIQUID FILLED ORAL DAILY
Qty: 7 CAPSULE | Refills: 0 | Status: SHIPPED | OUTPATIENT
Start: 2023-06-07 | End: 2023-06-19

## 2023-06-07 RX ADMIN — SCOPALAMINE 1 PATCH: 1 PATCH, EXTENDED RELEASE TRANSDERMAL at 09:11

## 2023-06-07 RX ADMIN — FAMOTIDINE 20 MG: 10 INJECTION INTRAVENOUS at 09:11

## 2023-06-07 RX ADMIN — EPHEDRINE SULFATE 10 MG: 50 INJECTION INTRAVENOUS at 12:41

## 2023-06-07 RX ADMIN — HYDROMORPHONE HYDROCHLORIDE 0.25 MG: 1 INJECTION, SOLUTION INTRAMUSCULAR; INTRAVENOUS; SUBCUTANEOUS at 13:54

## 2023-06-07 RX ADMIN — HYDROCODONE BITARTRATE AND ACETAMINOPHEN 1 TABLET: 5; 325 TABLET ORAL at 14:55

## 2023-06-07 RX ADMIN — PROPOFOL 200 MG: 10 INJECTION, EMULSION INTRAVENOUS at 11:17

## 2023-06-07 RX ADMIN — FENTANYL CITRATE 25 MCG: 50 INJECTION INTRAMUSCULAR; INTRAVENOUS at 11:17

## 2023-06-07 RX ADMIN — FENTANYL CITRATE 25 MCG: 50 INJECTION INTRAMUSCULAR; INTRAVENOUS at 12:34

## 2023-06-07 RX ADMIN — FENTANYL CITRATE 25 MCG: 50 INJECTION INTRAMUSCULAR; INTRAVENOUS at 11:52

## 2023-06-07 RX ADMIN — ONDANSETRON 4 MG: 2 INJECTION INTRAMUSCULAR; INTRAVENOUS at 13:10

## 2023-06-07 RX ADMIN — ROCURONIUM BROMIDE 35 MG: 50 INJECTION, SOLUTION INTRAVENOUS at 11:17

## 2023-06-07 RX ADMIN — HYDROMORPHONE HYDROCHLORIDE 0.25 MG: 1 INJECTION, SOLUTION INTRAMUSCULAR; INTRAVENOUS; SUBCUTANEOUS at 14:44

## 2023-06-07 RX ADMIN — ACETAMINOPHEN 1000 MG: 500 TABLET, FILM COATED ORAL at 09:11

## 2023-06-07 RX ADMIN — LIDOCAINE HYDROCHLORIDE 70 MG: 20 INJECTION, SOLUTION INFILTRATION; PERINEURAL at 11:17

## 2023-06-07 RX ADMIN — OXYCODONE HYDROCHLORIDE 10 MG: 10 TABLET, FILM COATED, EXTENDED RELEASE ORAL at 09:10

## 2023-06-07 RX ADMIN — HYDROMORPHONE HYDROCHLORIDE 0.25 MG: 1 INJECTION, SOLUTION INTRAMUSCULAR; INTRAVENOUS; SUBCUTANEOUS at 14:32

## 2023-06-07 RX ADMIN — CEFAZOLIN SODIUM 2 G: 2 INJECTION, SOLUTION INTRAVENOUS at 11:04

## 2023-06-07 RX ADMIN — TECHNETIUM TC 99M SESTAMIBI 1 DOSE: 1 INJECTION INTRAVENOUS at 09:44

## 2023-06-07 RX ADMIN — SUGAMMADEX 200 MG: 100 INJECTION, SOLUTION INTRAVENOUS at 13:10

## 2023-06-07 RX ADMIN — FENTANYL CITRATE 25 MCG: 50 INJECTION INTRAMUSCULAR; INTRAVENOUS at 13:22

## 2023-06-07 RX ADMIN — SODIUM CHLORIDE, POTASSIUM CHLORIDE, SODIUM LACTATE AND CALCIUM CHLORIDE 9 ML/HR: 600; 310; 30; 20 INJECTION, SOLUTION INTRAVENOUS at 09:10

## 2023-06-07 RX ADMIN — HYDROMORPHONE HYDROCHLORIDE 0.25 MG: 1 INJECTION, SOLUTION INTRAMUSCULAR; INTRAVENOUS; SUBCUTANEOUS at 14:03

## 2023-06-07 RX ADMIN — ROCURONIUM BROMIDE 15 MG: 50 INJECTION, SOLUTION INTRAVENOUS at 11:49

## 2023-06-07 RX ADMIN — SODIUM CHLORIDE, POTASSIUM CHLORIDE, SODIUM LACTATE AND CALCIUM CHLORIDE: 600; 310; 30; 20 INJECTION, SOLUTION INTRAVENOUS at 12:45

## 2023-06-07 NOTE — INTERVAL H&P NOTE
H&P updated. The patient was examined and the following changes are noted:  intra op PTH 66.

## 2023-06-07 NOTE — DISCHARGE INSTRUCTIONS
Scopolamine Patch  This patch has been applied to the skin behind one of your ears.  It may stay in place up to 24 hours. You may remove it at any time after your surgery; however, it should be removed after you are up and walking around the next day.  This medicine reduces stomach upset. Side effects may include: dry mouth, dizziness, sleepiness, constipation, or upset stomach.  An allergy would show up as: a rash, itching, wheezing or shortness of breath.  Follow these instructions:  Do not drink alcohol, drive or operate machinery while taking this medicine.  Wear only 1 patch at a time. You can leave the patch on for up to 24 hours.  When you remove the patch, fold it in half with the sticky sides together and throw it away. Wash your hands and the area under the patch.  Do not touch your eye with your hand if it has touched the patch.  Wash your hands well before and after touching the patch.  Sit or stand slowly to avoid dizziness.  Call your doctor if you have:  Any sign of allergy  No relief  Trouble passing urine  Any new or severe symptoms

## 2023-06-07 NOTE — OP NOTE
Operative Note  Parathyroid  06/07/23      Pre-op Diagnosis:   Hyperparathyroidism    Post-op Diagnosis:  Parathyroid adenoma, left inferior    Procedure:   Parathyroid resection, left inferior  Right exploration    Surgeon: Louise    Assistant: Alexis    Nephrologist/Endocrinologist: Rush    Associated Issues:  Pre op calcium: 10.5, ionized calcium 5.7 (4.5-5.6)  Pre op intact PTH: 77  Pre op vit D: 38.4  Hypercalcemia (ionized calcium)  Metabolic bone disease with alk phos 132.  Fatigue, muscles aches and history of kidney stones  Suspected left inferior parathyroid adenoma, adjacent esophagus and spine  Possible right adenoma, located mid thyroid, possible thyroid adenoma  Hypertension on Cozaar, Norvasc  Previous neck fusion with stiff neck.    Findings:   Intraoperative STAT PTH preoperatively 66  Intraoperative STAT PTH post-resection 32.1 and 29.8`  Gross findings:    Grossly enlarged left inferior parathyroid, very posteriorly oriented, consistent with findings on SPECT-CT.  Right inferior parathyroid normal in appearance and was the area of activity noted on neoprobe Intra-Op   Pathology frozen:   Consistent with parathyroid adenoma  US not particularly helpful  Neoprobe with good readings indicative of Intra-Op findings on both sides    Recommendations:   Discharged on 1 Tums daily and calcitriol 0.25 mcg daily for 1 week    EBL: <50 ml    Technique:     General anesthetic was induced.  IV Kefzol was given.  The neck was extended, and then prepped with Hibiclens and draped sterilely.  US was used and revealed what appeared to be a thyroid cyst on the right to me, not an adenoma, and inability to see anything with Definity on the left, my thoughts being that it was too deep..  The neoprobe had very high readings on the left with some on the right lower as well. The neck was examined and the skin creases evaluated to determine the best location for the incision, attempting to maximize both operative  exposure and post op cosmesis.  I selected a lower area of the neck, and marked.      1/2% Marcaine with epinephrine was used to inject the site.  Incision was made thru the skin and subcutaneous space and then completed with cautery to the cervical fascia.  Flaps were then raised with the facelift retractors and cautery, directly on the anterior aspect of the fascia, both superiorly and inferiorly.  The strap muscles were then divided in the midline.    Dissection was then begun under the strap muscle on the left side.  The bipolar on 15 was used.  I first divided the thin areolar tissue under the musculature, to free the thyroid anteriorly, then laterally, working directly on the muscle to avoid injury either to the thyroid or to the nerve or parathyroids.  The middle thyroid vein not sacrificed.  The thyroid was rotated medially and anteriorly, using the Allises.  Probing back posteriorly, I was able to uncover a rolling mass and finally was able to pull that up revealing that to be a parathyroid.  It was divided with bipolar after having identified the nerve coursing superior and lateral to it.    I then went to the right side to try to ensure that there was not an enlarged parathyroid there.  I did not find the right inferior and appeared normal in size, it being the only area of activity on that side.  I felt like I identified the superior as well but not with this with certainty.  With this and the pathology returning as an adenoma I felt it was appropriate to stop.  Hemostasis with the bipolar and Surgicel placed.    The wound was then closed with a running 3-0 Vicryl to the midline strap muscles, followed by interrupted 3-0 Vicryl to the platysma, interrupted 3-0 Vicryl to the subcutaneous, and running 5-0 Vicryl to the skin.  Exofin was applied to the wound.    Cecilia Gil MD  06/07/23      Assistant helped with retraction, exposure, closure, suturing and application of dressings with that help being  critical to an expeditious and safe operation.

## 2023-06-07 NOTE — ANESTHESIA POSTPROCEDURE EVALUATION
"Patient: Larry Kwon    Procedure Summary       Date: 06/07/23 Room / Location: [facility] OR 09 / [facility] MAIN OR    Anesthesia Start: 1109 Anesthesia Stop: 1351    Procedure: left inferior parathyroidectomy with bilateral exploration (Neck) Diagnosis:       Primary hyperparathyroidism      Hypercalcemia      Kidney stones      (Primary hyperparathyroidism [E21.0])      (Hypercalcemia [E83.52])      (Kidney stones [N20.0])    Surgeons: Cecilia Gil MD Provider: Emanuel Lopez MD    Anesthesia Type: general ASA Status: 2            Anesthesia Type: general    Vitals  Vitals Value Taken Time   /72 06/07/23 1515   Temp     Pulse 89 06/07/23 1518   Resp     SpO2 97 % 06/07/23 1449   Vitals shown include unvalidated device data.        Post Anesthesia Care and Evaluation    Patient location during evaluation: bedside  Patient participation: complete - patient participated  Level of consciousness: awake and alert  Pain management: adequate    Airway patency: patent  Anesthetic complications: No anesthetic complications  PONV Status: controlled  Cardiovascular status: acceptable and hemodynamically stable  Respiratory status: acceptable, spontaneous ventilation and nonlabored ventilation  Hydration status: acceptable    Comments: /69   Pulse 86   Temp 36.4 °C (97.6 °F) (Oral)   Resp 16   Ht 172.7 cm (68\")   Wt 83.8 kg (184 lb 11.9 oz)   SpO2 95%   BMI 28.09 kg/m²         "

## 2023-06-07 NOTE — ANESTHESIA PREPROCEDURE EVALUATION
Anesthesia Evaluation     Patient summary reviewed and Nursing notes reviewed   history of anesthetic complications:  PONV  NPO Solid Status: > 8 hours  NPO Liquid Status: > 4 hours           Airway   Mallampati: II  TM distance: >3 FB  Neck ROM: full  No difficulty expected  Comment: Good neck extension  Dental - normal exam     Pulmonary - normal exam    breath sounds clear to auscultation  Cardiovascular - normal exam    ECG reviewed  Rhythm: regular  Rate: normal    (+) hypertension 2 medications or greaterdysrhythmias, hyperlipidemia    ROS comment: RBBB    Neuro/Psych  (+) numbness, psychiatric history    ROS Comment: Meralgia paresthetica left leg/dysthymia  GI/Hepatic/Renal/Endo    (+) renal disease stones, thyroid problem     ROS Comment: Hyperparathyroidism    Musculoskeletal     (+) back pain, chronic pain      ROS comment: Hx cervical fusion/lumbar discectomy  Abdominal  - normal exam   Substance History      OB/GYN          Other   arthritis,                     Anesthesia Plan    ASA 2     general     intravenous induction     Anesthetic plan, risks, benefits, and alternatives have been provided, discussed and informed consent has been obtained with: patient.    Plan discussed with CRNA.    CODE STATUS:

## 2023-06-07 NOTE — ANESTHESIA PROCEDURE NOTES
Airway  Urgency: elective    Date/Time: 6/7/2023 11:20 AM  Airway not difficult    General Information and Staff    Patient location during procedure: OR  Anesthesiologist: Emanuel Lopez MD  CRNA/CAA: Alma Hooper CRNA    Indications and Patient Condition  Indications for airway management: airway protection    Preoxygenated: yes  MILS not maintained throughout  Mask difficulty assessment: 1 - vent by mask    Final Airway Details  Final airway type: endotracheal airway      Successful airway: ETT and reinforced tube  Cuffed: yes   Successful intubation technique: direct laryngoscopy  Facilitating devices/methods: intubating stylet  Blade: Alfredo  Blade size: 4  ETT size (mm): 7.5  Cormack-Lehane Classification: grade IIb - view of arytenoids or posterior of glottis only  Placement verified by: chest auscultation and capnometry   Cuff volume (mL): 8  Measured from: teeth  ETT/EBT  to teeth (cm): 22  Number of attempts at approach: 1  Assessment: lips, teeth, and gum same as pre-op and atraumatic intubation

## 2023-06-08 LAB
LAB AP CASE REPORT: NORMAL
LAB AP CLINICAL INFORMATION: NORMAL
LAB AP DIAGNOSIS COMMENT: NORMAL
Lab: NORMAL
PATH REPORT.FINAL DX SPEC: NORMAL
PATH REPORT.GROSS SPEC: NORMAL

## 2023-06-12 PROBLEM — E83.52 HYPERCALCEMIA: Status: RESOLVED | Noted: 2022-11-11 | Resolved: 2023-06-12

## 2023-06-12 PROBLEM — E21.0 PRIMARY HYPERPARATHYROIDISM: Status: RESOLVED | Noted: 2023-05-19 | Resolved: 2023-06-12

## 2023-06-12 NOTE — PROGRESS NOTES
SURGERY: KEVIN  Post op Visit  Larry Kwon  06/19/2023    Mr. Kwon  presents today after having undergone Surgery 6/7/2023, of a left inferior parathyroid resection, right exploration.  This was for a left inferior parathyroid adenoma with studies suggesting a possible right adenoma.    Intraoperative stat PTH went from 66 to 32.1, with a grossly enlarged left inferior parathyroid found, right inferior parathyroid normal in appearance and was the area of activity noted on the neoprobe.  Pathology on frozen was consistent with an adenoma.    He was discharged on 1 Tums daily and calcitriol 0.25 mcg daily and labs of calcium 9.7 and intact PTH 41.6.  He's finished his calcitriol and is still on his TUMS.    Today,he looks great.  His incision is healing well, tho looks a little irritated, likely from his collar.      He had his DEXA and it showed osteopenia.  He will see Dr Beverly et al so i will let them instruct him on his calcium (TUMS).    Cecilia Gil MD  12:47 EDT

## 2023-06-13 ENCOUNTER — LAB (OUTPATIENT)
Dept: LAB | Facility: HOSPITAL | Age: 73
End: 2023-06-13
Payer: MEDICARE

## 2023-06-13 DIAGNOSIS — E21.0 PRIMARY HYPERPARATHYROIDISM: ICD-10-CM

## 2023-06-13 DIAGNOSIS — E83.52 HYPERCALCEMIA: ICD-10-CM

## 2023-06-13 LAB
CALCIUM SPEC-SCNC: 9.7 MG/DL (ref 8.6–10.5)
PTH-INTACT SERPL-MCNC: 41.6 PG/ML (ref 15–65)

## 2023-06-13 PROCEDURE — 83970 ASSAY OF PARATHORMONE: CPT

## 2023-06-13 PROCEDURE — 82310 ASSAY OF CALCIUM: CPT

## 2023-06-13 PROCEDURE — 36415 COLL VENOUS BLD VENIPUNCTURE: CPT

## 2023-06-15 ENCOUNTER — HOSPITAL ENCOUNTER (OUTPATIENT)
Dept: BONE DENSITY | Facility: HOSPITAL | Age: 73
Discharge: HOME | End: 2023-06-15
Admitting: INTERNAL MEDICINE
Payer: MEDICARE

## 2023-06-15 DIAGNOSIS — E21.0 PRIMARY HYPERPARATHYROIDISM: ICD-10-CM

## 2023-06-15 DIAGNOSIS — E83.52 HYPERCALCEMIA: ICD-10-CM

## 2023-06-15 PROCEDURE — 77080 DXA BONE DENSITY AXIAL: CPT

## 2023-06-16 DIAGNOSIS — M54.42 CHRONIC BILATERAL LOW BACK PAIN WITH BILATERAL SCIATICA: ICD-10-CM

## 2023-06-16 DIAGNOSIS — M54.41 CHRONIC BILATERAL LOW BACK PAIN WITH BILATERAL SCIATICA: ICD-10-CM

## 2023-06-16 DIAGNOSIS — G89.29 CHRONIC BILATERAL LOW BACK PAIN WITH BILATERAL SCIATICA: ICD-10-CM

## 2023-06-16 RX ORDER — HYDROCODONE BITARTRATE AND ACETAMINOPHEN 7.5; 325 MG/1; MG/1
1 TABLET ORAL 2 TIMES DAILY
Qty: 60 TABLET | Refills: 0 | Status: SHIPPED | OUTPATIENT
Start: 2023-06-16

## 2023-06-16 NOTE — TELEPHONE ENCOUNTER
Caller: Leslie Kwon    Relationship: Emergency Contact    Best call back number: [phone]     Requested Prescriptions:          HYDROcodone-acetaminophen (NORCO) 7.5-325 MG per tablet       Pharmacy where request should be sent: [id number] [address]  AT [facility] [phone] [facility] [phone] FX     Last office visit with prescribing clinician: 5/19/2023   Last telemedicine visit with prescribing clinician: Visit date not found   Next office visit with prescribing clinician: 8/11/2023     Additional details provided by patient: MRS KWON STATED HER  WILL BE OUT OF HIS PRESCRIPTION THIS WEEKEND.    MRS KWON ASKED TO LET DOCTOR CHELI KNOW THAT HER  IS DOING REALLY WELL FROM HIS SURGERY.    Does the patient have less than a 3 day supply:  [x] Yes  [] No    Would you like a call back once the refill request has been completed: [] Yes [x] No    If the office needs to give you a call back, can they leave a voicemail: [] Yes [x] No    Sanaz Herrera Rep   06/16/23 14:36 EDT

## 2023-06-19 ENCOUNTER — OFFICE VISIT (OUTPATIENT)
Dept: SURGERY | Facility: CLINIC | Age: 73
End: 2023-06-19
Payer: MEDICARE

## 2023-06-19 VITALS
DIASTOLIC BLOOD PRESSURE: 75 MMHG | HEIGHT: 68 IN | SYSTOLIC BLOOD PRESSURE: 138 MMHG | BODY MASS INDEX: 28.34 KG/M2 | WEIGHT: 187 LBS

## 2023-06-19 DIAGNOSIS — E83.52 HYPERCALCEMIA: Primary | ICD-10-CM

## 2023-06-19 DIAGNOSIS — E21.0 PRIMARY HYPERPARATHYROIDISM: ICD-10-CM

## 2023-06-19 PROCEDURE — 1159F MED LIST DOCD IN RCRD: CPT | Performed by: SURGERY

## 2023-06-19 PROCEDURE — 3078F DIAST BP <80 MM HG: CPT | Performed by: SURGERY

## 2023-06-19 PROCEDURE — 3075F SYST BP GE 130 - 139MM HG: CPT | Performed by: SURGERY

## 2023-06-19 PROCEDURE — 99024 POSTOP FOLLOW-UP VISIT: CPT | Performed by: SURGERY

## 2023-06-19 PROCEDURE — 1160F RVW MEDS BY RX/DR IN RCRD: CPT | Performed by: SURGERY

## 2023-06-23 ENCOUNTER — TELEPHONE (OUTPATIENT)
Dept: SURGERY | Facility: CLINIC | Age: 73
End: 2023-06-23

## 2023-06-23 NOTE — TELEPHONE ENCOUNTER
Caller: Larry Kwon     Relationship to patient: SELF     Best call back number: [phone]     Patient is needing:  PATIENT IS APPRECIATIVE OF DR BROWN WILLING TO MEET WITH HIM TODAY, BUT HE HAS AN HEARING AND WILL NOT BE ABLE TO MAKE IT

## 2023-08-11 ENCOUNTER — OFFICE VISIT (OUTPATIENT)
Dept: FAMILY MEDICINE CLINIC | Facility: CLINIC | Age: 73
End: 2023-08-11
Payer: MEDICARE

## 2023-08-11 VITALS
DIASTOLIC BLOOD PRESSURE: 72 MMHG | BODY MASS INDEX: 28.07 KG/M2 | TEMPERATURE: 98.7 F | WEIGHT: 185.2 LBS | SYSTOLIC BLOOD PRESSURE: 126 MMHG | HEIGHT: 68 IN | HEART RATE: 89 BPM | OXYGEN SATURATION: 97 %

## 2023-08-11 DIAGNOSIS — R73.03 PREDIABETES: ICD-10-CM

## 2023-08-11 DIAGNOSIS — E79.0 HYPERURICEMIA: ICD-10-CM

## 2023-08-11 DIAGNOSIS — I10 PRIMARY HYPERTENSION: ICD-10-CM

## 2023-08-11 DIAGNOSIS — H40.89 BILATERAL GLAUCOMA DUE TO COMBINATION OF MECHANISMS: ICD-10-CM

## 2023-08-11 DIAGNOSIS — G89.29 CHRONIC BILATERAL LOW BACK PAIN WITH BILATERAL SCIATICA: ICD-10-CM

## 2023-08-11 DIAGNOSIS — N40.1 BENIGN PROSTATIC HYPERPLASIA WITH NOCTURIA: ICD-10-CM

## 2023-08-11 DIAGNOSIS — M54.41 CHRONIC BILATERAL LOW BACK PAIN WITH BILATERAL SCIATICA: ICD-10-CM

## 2023-08-11 DIAGNOSIS — Z86.39 HISTORY OF PRIMARY HYPERPARATHYROIDISM: ICD-10-CM

## 2023-08-11 DIAGNOSIS — R35.1 BENIGN PROSTATIC HYPERPLASIA WITH NOCTURIA: ICD-10-CM

## 2023-08-11 DIAGNOSIS — E55.9 VITAMIN D DEFICIENCY: ICD-10-CM

## 2023-08-11 DIAGNOSIS — Z00.01 ENCOUNTER FOR GENERAL ADULT MEDICAL EXAMINATION WITH ABNORMAL FINDINGS: Primary | ICD-10-CM

## 2023-08-11 DIAGNOSIS — I45.10 RBBB: ICD-10-CM

## 2023-08-11 DIAGNOSIS — E78.2 MIXED HYPERLIPIDEMIA: ICD-10-CM

## 2023-08-11 DIAGNOSIS — Z79.891 CHRONICALLY ON OPIATE THERAPY: ICD-10-CM

## 2023-08-11 DIAGNOSIS — N20.0 KIDNEY STONES: ICD-10-CM

## 2023-08-11 DIAGNOSIS — Z13.29 SCREENING FOR THYROID DISORDER: ICD-10-CM

## 2023-08-11 DIAGNOSIS — M54.42 CHRONIC BILATERAL LOW BACK PAIN WITH BILATERAL SCIATICA: ICD-10-CM

## 2023-08-11 DIAGNOSIS — E66.3 OVERWEIGHT (BMI 25.0-29.9): ICD-10-CM

## 2023-08-11 RX ORDER — CHOLECALCIFEROL (VITAMIN D3) 50 MCG
2000 TABLET ORAL DAILY
Qty: 90 TABLET | Refills: 3 | Status: SHIPPED | OUTPATIENT
Start: 2023-08-11

## 2023-08-11 RX ORDER — ALLOPURINOL 100 MG/1
100 TABLET ORAL DAILY
Qty: 90 TABLET | Refills: 3 | Status: SHIPPED | OUTPATIENT
Start: 2023-08-11

## 2023-08-11 RX ORDER — CHOLECALCIFEROL (VITAMIN D3) 50 MCG
2000 TABLET ORAL DAILY
COMMUNITY
End: 2023-08-11 | Stop reason: SDUPTHER

## 2023-08-11 RX ORDER — AMLODIPINE BESYLATE 5 MG/1
5 TABLET ORAL DAILY
Qty: 90 TABLET | Refills: 3 | Status: SHIPPED | OUTPATIENT
Start: 2023-08-11

## 2023-08-11 RX ORDER — LOSARTAN POTASSIUM 100 MG/1
100 TABLET ORAL DAILY
Qty: 90 TABLET | Refills: 3 | Status: SHIPPED | OUTPATIENT
Start: 2023-08-11

## 2023-08-11 RX ORDER — ATORVASTATIN CALCIUM 20 MG/1
20 TABLET, FILM COATED ORAL DAILY
Qty: 90 TABLET | Refills: 3 | Status: SHIPPED | OUTPATIENT
Start: 2023-08-11

## 2023-08-11 RX ORDER — HYDROCODONE BITARTRATE AND ACETAMINOPHEN 7.5; 325 MG/1; MG/1
1 TABLET ORAL 2 TIMES DAILY
Qty: 60 TABLET | Refills: 0 | Status: SHIPPED | OUTPATIENT
Start: 2023-08-11

## 2023-08-11 RX ORDER — CLOBETASOL PROPIONATE 0.5 MG/ML
LOTION TOPICAL 2 TIMES DAILY
Qty: 59 ML | Refills: 1 | Status: SHIPPED | OUTPATIENT
Start: 2023-08-11

## 2023-08-11 RX ORDER — MONTELUKAST SODIUM 10 MG/1
10 TABLET ORAL NIGHTLY
Qty: 90 TABLET | Refills: 3 | Status: SHIPPED | OUTPATIENT
Start: 2023-08-11

## 2023-08-11 NOTE — PROGRESS NOTES
The ABCs of the Annual Wellness Visit  Subsequent Medicare Wellness Visit    Subjective    Larry Kwon is a 73 y.o. male who presents for a Subsequent Medicare Wellness Visit.    The following portions of the patient's history were reviewed and   updated as appropriate: allergies, current medications, past family history, past medical history, past social history, past surgical history, and problem list.    Compared to one year ago, the patient feels his physical   health is better.    Compared to one year ago, the patient feels his mental   health is the same.    Recent Hospitalizations:  He was admitted within the past 365 days at [facility] surgery for parathyroid resection.       Current Medical Providers:  Patient Care Team:  Jf Lassiter MD as PCP - General (Internal Medicine)    Outpatient Medications Prior to Visit   Medication Sig Dispense Refill    latanoprost (XALATAN) 0.005 % ophthalmic solution       allopurinol (ZYLOPRIM) 100 MG tablet Take 1 tablet by mouth Daily.      amLODIPine (NORVASC) 5 MG tablet Take 1 tablet by mouth Daily.      atorvastatin (LIPITOR) 20 MG tablet Take 1 tablet by mouth Daily.      cholecalciferol (VITAMIN D3) 25 MCG (1000 UT) tablet       clobetasol (CLOBEX) 0.05 % lotion Apply  topically to the appropriate area as directed 2 (Two) Times a Day. 59 mL 1    HYDROcodone-acetaminophen (NORCO) 7.5-325 MG per tablet Take 1 tablet by mouth 2 (Two) Times a Day. 60 tablet 0    losartan (COZAAR) 100 MG tablet Take 1 tablet by mouth Daily.      montelukast (SINGULAIR) 10 MG tablet Take 1 tablet by mouth Every Night.      Cholecalciferol (Vitamin D) 50 MCG (2000 UT) tablet Take 1 tablet by mouth Daily.       No facility-administered medications prior to visit.       Opioid medication/s are on active medication list.  and I have evaluated his active treatment plan and pain score trends (see table).  There were no vitals filed for this visit.  I have reviewed  "the chart for potential of high risk medication and harmful drug interactions in the elderly.          Aspirin is not on active medication list.  Aspirin use is not indicated based on review of current medical condition/s. Risk of harm outweighs potential benefits.  .    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    Mixed hyperlipidemia    Hyperuricemia    Bilateral low back pain with left-sided sciatica    Irritability    Meralgia paresthetica of left side    Diverticulosis    Right-sided low back pain with right-sided sciatica    Right knee pain    Prediabetes    Need for prophylactic vaccination and inoculation against influenza    Ureterolithiasis    Kidney stones    Gout    HTN (hypertension)    Seasonal allergies    Low serum vitamin D    Glaucoma    Degeneration of intervertebral disc of lumbar region    Complication of anesthesia    COVID-19 vaccine administered    Chronic bilateral low back pain with bilateral sciatica    History of primary hyperparathyroidism    Overweight (BMI 25.0-29.9)     Advance Care Planning   Advance Care Planning     Advance Directive is on file.  ACP discussion was held with the patient during this visit. Patient has an advance directive in EMR which is still valid.      Objective    Vitals:    08/11/23 1248   BP: 126/72   BP Location: Left arm   Patient Position: Sitting   Cuff Size: Adult   Pulse: 89   Temp: 98.7 øF (37.1 øC)   TempSrc: Temporal   SpO2: 97%   Weight: 84 kg (185 lb 3.2 oz)   Height: 172.7 cm (68\")     Estimated body mass index is 28.16 kg/mý as calculated from the following:    Height as of this encounter: 172.7 cm (68\").    Weight as of this encounter: 84 kg (185 lb 3.2 oz).           Does the " patient have evidence of cognitive impairment? No    Lab Results   Component Value Date    HGBA1C 5.6 2023        HEALTH RISK ASSESSMENT    Smoking Status:  Social History     Tobacco Use   Smoking Status Never   Smokeless Tobacco Never     Alcohol Consumption:  Social History     Substance and Sexual Activity   Alcohol Use Yes    Alcohol/week: 2.0 - 3.0 standard drinks    Types: 2 - 3 Glasses of wine per week    Comment: rarely     Fall Risk Screen:    MARIYA Fall Risk Assessment was completed, and patient is at LOW risk for falls.Assessment completed on:2023    Depression Screenin/11/2023     1:01 PM   PHQ-2/PHQ-9 Depression Screening   Little Interest or Pleasure in Doing Things 0-->not at all   Feeling Down, Depressed or Hopeless 0-->not at all   PHQ-9: Brief Depression Severity Measure Score 0       Health Habits and Functional and Cognitive Screenin/11/2023    12:58 PM   Functional & Cognitive Status   Do you have difficulty preparing food and eating? No   Do you have difficulty bathing yourself, getting dressed or grooming yourself? No   Do you have difficulty using the toilet? No   Do you have difficulty moving around from place to place? No   Do you have trouble with steps or getting out of a bed or a chair? No   Current Diet Well Balanced Diet   Dental Exam Up to date   Eye Exam Up to date   Exercise (times per week) 7 times per week   Current Exercises Include Walking;Yard Work;House Cleaning   Do you need help using the phone?  No   Are you deaf or do you have serious difficulty hearing?  No   Do you need help to go to places out of walking distance? No   Do you need help shopping? No   Do you need help preparing meals?  No   Do you need help with housework?  No   Do you need help with laundry? No   Do you need help taking your medications? No   Do you need help managing money? No   Do you ever drive or ride in a car without wearing a seat belt? No   Have you felt unusual  stress, anger or loneliness in the last month? No   Who do you live with? Spouse   If you need help, do you have trouble finding someone available to you? No   Have you been bothered in the last four weeks by sexual problems? No   Do you have difficulty concentrating, remembering or making decisions? No       Age-appropriate Screening Schedule:  Refer to the list below for future screening recommendations based on patient's age, sex and/or medical conditions. Orders for these recommended tests are listed in the plan section. The patient has been provided with a written plan.    Health Maintenance   Topic Date Due    COVID-19 Vaccine (5 - Pfizer series) 01/15/2023    LIPID PANEL  08/05/2023    ZOSTER VACCINE (1 of 2) 11/01/2024 (Originally 4/2/1998)    INFLUENZA VACCINE  10/01/2023    ANNUAL WELLNESS VISIT  08/11/2024    COLORECTAL CANCER SCREENING  03/05/2026    TDAP/TD VACCINES (3 - Td or Tdap) 08/05/2032    HEPATITIS C SCREENING  Completed    Pneumococcal Vaccine 65+  Completed                  CMS Preventative Services Quick Reference  Risk Factors Identified During Encounter  Chronic Pain:  on chronic opiatr therapy  Glaucoma or Family History of Glaucoma:  Ophthalmology Appointment Recommended  Immunizations Discussed/Encouraged: Influenza, Shingrix, and COVID19  Dental Screening Recommended  Vision Screening Recommended  The above risks/problems have been discussed with the patient.  Pertinent information has been shared with the patient in the After Visit Summary.  An After Visit Summary and PPPS were made available to the patient.    Follow Up:   Next Medicare Wellness visit to be scheduled in 1 year.       Additional E&M Note during same encounter follows:  Patient has multiple medical problems which are significant and separately identifiable that require additional work above and beyond the Medicare Wellness Visit.      Chief Complaint  Follow-up    Subjective      HPI  Larry Kwon is also being seen  today for a general medical review.  Patient has a history of primary hyperparathyroidism having undergone resection on 6/7/2023 of a thyroid adenoma in the left inferior region, having had a postop intact PTH and ionized calcium normal on 6/13/2023.  He has been released from his general surgeon and follows up with endocrinology.  He also has prediabetes with last hemoglobin A1c having normalized in the upper range at 5.6% in 11/2022.  History of hypertension taking amlodipine 5 mg daily and losartan 100 mg daily with blood pressures averaging 120s to 130s over 70s checked weekly.  History of RBBB with no known coronary artery disease, denies chest pains palpitations dyspnea dizziness or edema.  Has hyperlipidemia taking atorvastatin 20 mg nightly, history of BPH with history of recurrent kidney stones followed by urology, currently on no targeted meds, vitamin D deficiency having had an increase in his dose from 1000 IU up to 2000 IU by endocrinology.  He has had hyperuricemia with recurrent gout currently on allopurinol 100 mg daily. Also has had chronic lumbago with intermittent sciatica, left greater than right, for which she takes Norco 7.5 mg prescribed twice daily though taken some days less than some days more averaging 60 tablets monthly with an improved quality life and no significant side effects.  His [place] screens are always appropriate, controlled substance agreement has been signed previously and he is due for urine drug screen today.  Also has glaucoma bilaterally for which she takes Xalatan drops and follows up regularly with ophthalmology.  He is status post colonoscopy in 3/2021 with multiple polyps with a 3-year follow-up recommended correlating to 3/2024.  He is due for a COVID-19 bivalent booster and also Shingrix vaccine series.  He does get flu vaccines each fall.    Review of systems stable, only noting chronic back pain, otherwise unremarkable.         Objective   Vital Signs:  /72  "(BP Location: Left arm, Patient Position: Sitting, Cuff Size: Adult)   Pulse 89   Temp 98.7 øF (37.1 øC) (Temporal)   Ht 172.7 cm (68\")   Wt 84 kg (185 lb 3.2 oz)   SpO2 97%   BMI 28.16 kg/mý     Physical Exam  Vitals and nursing note reviewed.   Constitutional:       Appearance: Normal appearance.      Comments: Pleasant, no acute distress, alert and oriented, fluent speech, BMI 28.6 reflecting 3 pound weight loss in the last 3 months   HENT:      Head: Normocephalic and atraumatic.      Comments: Significant male pattern baldness     Right Ear: Tympanic membrane, ear canal and external ear normal.      Left Ear: Tympanic membrane, ear canal and external ear normal.      Nose: Nose normal.      Mouth/Throat:      Mouth: Mucous membranes are moist.      Pharynx: Oropharynx is clear.      Comments: No appreciated dental decay  Eyes:      Extraocular Movements: Extraocular movements intact.      Conjunctiva/sclera: Conjunctivae normal.      Pupils: Pupils are equal, round, and reactive to light.   Neck:      Vascular: No carotid bruit.      Comments: Cervical adenopathy masses or tenderness, no periclavicular or axillary or inguinal adenopathy, significant decreased range of motion of his neck to rotation and extension with fair flexion  Cardiovascular:      Rate and Rhythm: Normal rate and regular rhythm.      Pulses: Normal pulses.      Heart sounds: Normal heart sounds. No murmur heard.    No friction rub. No gallop.      Comments: 2+ carotids without bruits, 2+ radial pulses, 2+ femoral pulses without bruits, 2+ bipedal pulses with good perfusion and no edema  Pulmonary:      Effort: Pulmonary effort is normal.      Breath sounds: Normal breath sounds.      Comments: No cough wheeze or dyspnea  Abdominal:      General: Abdomen is flat. Bowel sounds are normal.      Palpations: Abdomen is soft. There is no mass.      Tenderness: There is no abdominal tenderness. There is no guarding or rebound. "   Genitourinary:     Comments:  and rectal exams deferred given regular follow-up with urology and no acute related problems  Musculoskeletal:         General: Tenderness present. No swelling or deformity. Normal range of motion.      Cervical back: Normal range of motion and neck supple. Rigidity present. No tenderness.      Right lower leg: No edema.      Left lower leg: No edema.      Comments: Chronic mild tenderness to palpation of his lumbar region with positive straight leg raise left greater than right in a supine position   Lymphadenopathy:      Cervical: No cervical adenopathy.   Skin:     General: Skin is warm and dry.      Capillary Refill: Capillary refill takes less than 2 seconds.      Findings: No lesion or rash.   Neurological:      General: No focal deficit present.      Mental Status: He is alert and oriented to person, place, and time. Mental status is at baseline.      Sensory: No sensory deficit.      Motor: No weakness.      Coordination: Coordination normal.      Gait: Gait normal.      Comments: Positive bilateral straight leg raise, left greater than right, in the supine position   Psychiatric:         Mood and Affect: Mood normal.         Behavior: Behavior normal.         Thought Content: Thought content normal.         Judgment: Judgment normal.                 Assessment and Plan   Diagnoses and all orders for this visit:    1. Encounter for general adult medical examination with abnormal findings (Primary)  -     TSH Rfx On Abnormal To Free T4; Future  -     Comprehensive Metabolic Panel; Future  -     Lipid Panel; Future  -     Hemoglobin A1c; Future  -     PTH, Intact; Future  -     Cancel: Calcium, Ionized; Future  -     Phosphorus; Future  -     Vitamin D,25-Hydroxy; Future  -     Urinalysis With Culture If Indicated -; Future  -     MicroAlbumin, Urine, Random - Urine, Clean Catch; Future  -     Urine Drug Screen - Urine, Clean Catch; Future  -     Uric Acid; Future  -      HYDROcodone-acetaminophen (NORCO) 7.5-325 MG per tablet; Take 1 tablet by mouth 2 (Two) Times a Day.  Dispense: 60 tablet; Refill: 0  -     Calcium, Ionized; Future  -     Calcium, Ionized  -     Uric Acid  -     Vitamin D,25-Hydroxy  -     Phosphorus  -     PTH, Intact  -     Hemoglobin A1c  -     Lipid Panel  -     Comprehensive Metabolic Panel  -     TSH Rfx On Abnormal To Free T4  -     Urine Drug Screen - Urine, Clean Catch  -     MicroAlbumin, Urine, Random - Urine, Clean Catch  -     Urinalysis With Culture If Indicated - Urine, Clean Catch  Very pleasant 73-year-old male here for Medicare wellness visit, overall very stable clinical condition with health issues as detailed below stable.  Periodic health maintenance up-to-date including colonoscopy from 3/2021 with a 3-year follow-up recommended, recommended to obtain COVID-19 bivalent booster and also Shingrix vaccine series outside the office, also obtain flu vaccine each fall, obtain labs as detailed.  2. Prediabetes  -     Comprehensive Metabolic Panel; Future  -     Hemoglobin A1c; Future  -     MicroAlbumin, Urine, Random - Urine, Clean Catch; Future  -     Hemoglobin A1c  -     Comprehensive Metabolic Panel  -     MicroAlbumin, Urine, Random - Urine, Clean Catch  Hemoglobin A1c previously improved to 5.6% in 11/2022, with repeat Hemoglobin A1c obtained per lab testing.  Continue healthy lifestyle.  3. Primary hypertension  -     Comprehensive Metabolic Panel; Future  -     Urinalysis With Culture If Indicated -; Future  -     Comprehensive Metabolic Panel  -     Urinalysis With Culture If Indicated - Urine, Clean Catch  Very satisfactory blood pressure control acutely as well as chronically taking amlodipine 5 mg daily and losartan 100 mg daily.  Continue healthy lifestyle.  4. Mixed hyperlipidemia  -     Lipid Panel; Future  -     Lipid Panel  Taking atorvastatin 20 mg nightly.  Update lipid profile.  5. History of primary hyperparathyroidism  -      PTH, Intact; Future  -     Cancel: Calcium, Ionized; Future  -     Phosphorus; Future  -     Vitamin D,25-Hydroxy; Future  -     Calcium, Ionized; Future  -     Calcium, Ionized  -     Vitamin D,25-Hydroxy  -     Phosphorus  -     PTH, Intact  History of surgical resection of the left inferior parathyroid adenoma with primary hyperparathyroidism.  Patient did have a normal postop intact PTH and calcium level.  We will repeat pertinent labs.  He will follow-up with endocrinology in the near future.  6. RBBB  EKG not performed as performed most recently preoperatively on 6/2/2023, revealing a chronic right bundle branch block with sinus rhythm rate 74.  7. 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  8. Benign prostatic hyperplasia with nocturia  Symptoms at this time on no targeted meds.  Follow-up with urology.  9. Kidney stones  History of kidney stones with recurrent ureterolithiasis.  May benefit from reduction in kidney stones with treatment of his primary hyperparathyroidism.  10. Hyperuricemia  -     Uric Acid; Future  -     Uric Acid  Currently taking allopurinol 100 mg nightly.  No recent gouty attacks.  Update uric acid level.  11. Chronic bilateral low back pain with bilateral sciatica  -     HYDROcodone-acetaminophen (NORCO) 7.5-325 MG per tablet; Take 1 tablet by mouth 2 (Two) Times a Day.  Dispense: 60 tablet; Refill: 0  Clinically stable with longstanding symptoms, using Norco 7.5 mg prescribed twice daily for total #60 tablets monthly having an improved quality of life with no significant side effects on this medication.  Controlled substance agreement previously signed, [place] appropriate, urine drug screen has been appropriate previously and will be updated today per routine.  Concern on my part regarding inappropriate use of medication or diversion.  12. Overweight (BMI 25.0-29.9)  Patient has lost 3 pounds in weight in the  last 3 months.  Continue healthy lifestyle with diet and exercise.  13. Screening for thyroid disorder  -     TSH Rfx On Abnormal To Free T4; Future  -     TSH Rfx On Abnormal To Free T4  Check screen  14. Chronically on opiate therapy  -     Urine Drug Screen - Urine, Clean Catch; Future  -     HYDROcodone-acetaminophen (NORCO) 7.5-325 MG per tablet; Take 1 tablet by mouth 2 (Two) Times a Day.  Dispense: 60 tablet; Refill: 0  -     Urine Drug Screen - Urine, Clean Catch  Update urine drug screen, controlled substance agreement up-to-date, [place] appropriate.  Refill his Norco.  15. Vitamin D deficiency  -     Vitamin D,25-Hydroxy; Future  -     Vitamin D,25-Hydroxy  Check screen         Follow Up   Return in about 3 months (around 11/11/2023) for Recheck.  Patient was given instructions and counseling regarding his condition or for health maintenance advice. Please see specific information pulled into the AVS if appropriate.

## 2023-08-12 LAB
25(OH)D3+25(OH)D2 SERPL-MCNC: 37.2 NG/ML (ref 30–100)
ALBUMIN SERPL-MCNC: 4.7 G/DL (ref 3.8–4.8)
ALBUMIN/GLOB SERPL: 1.7 {RATIO} (ref 1.2–2.2)
ALP SERPL-CCNC: 125 IU/L (ref 44–121)
ALT SERPL-CCNC: 21 IU/L (ref 0–44)
AMPHETAMINES UR QL SCN: NEGATIVE NG/ML
APPEARANCE UR: CLEAR
AST SERPL-CCNC: 26 IU/L (ref 0–40)
BACTERIA #/AREA URNS HPF: NORMAL /[HPF]
BARBITURATES UR QL SCN: NEGATIVE NG/ML
BENZODIAZ UR QL SCN: NEGATIVE NG/ML
BILIRUB SERPL-MCNC: 1 MG/DL (ref 0–1.2)
BILIRUB UR QL STRIP: NEGATIVE
BUN SERPL-MCNC: 12 MG/DL (ref 8–27)
BUN/CREAT SERPL: 10 (ref 10–24)
BZE UR QL SCN: NEGATIVE NG/ML
CA-I SERPL ISE-MCNC: 5.2 MG/DL (ref 4.5–5.6)
CALCIUM SERPL-MCNC: 9.9 MG/DL (ref 8.6–10.2)
CANNABINOIDS UR QL SCN: NEGATIVE NG/ML
CASTS URNS QL MICRO: NORMAL /LPF
CHLORIDE SERPL-SCNC: 102 MMOL/L (ref 96–106)
CHOLEST SERPL-MCNC: 170 MG/DL (ref 100–199)
CO2 SERPL-SCNC: 23 MMOL/L (ref 20–29)
COLOR UR: YELLOW
CREAT SERPL-MCNC: 1.15 MG/DL (ref 0.76–1.27)
CREAT UR-MCNC: 127.6 MG/DL (ref 20–300)
EGFRCR SERPLBLD CKD-EPI 2021: 66 ML/MIN/1.73
EPI CELLS #/AREA URNS HPF: NORMAL /HPF (ref 0–10)
GLOBULIN SER CALC-MCNC: 2.7 G/DL (ref 1.5–4.5)
GLUCOSE SERPL-MCNC: 88 MG/DL (ref 70–99)
GLUCOSE UR QL STRIP: NEGATIVE
HBA1C MFR BLD: 5.5 % (ref 4.8–5.6)
HDLC SERPL-MCNC: 65 MG/DL
HGB UR QL STRIP: NEGATIVE
KETONES UR QL STRIP: NEGATIVE
LABORATORY COMMENT REPORT: ABNORMAL
LDLC SERPL CALC-MCNC: 83 MG/DL (ref 0–99)
LEUKOCYTE ESTERASE UR QL STRIP: NEGATIVE
METHADONE UR QL SCN: NEGATIVE NG/ML
MICRO URNS: NORMAL
MICRO URNS: NORMAL
MICROALBUMIN UR-MCNC: 21.9 UG/ML
NITRITE UR QL STRIP: NEGATIVE
OPIATES UR QL SCN: POSITIVE NG/ML
OXYCODONE+OXYMORPHONE UR QL SCN: NEGATIVE NG/ML
PCP UR QL: NEGATIVE NG/ML
PH UR STRIP: 6 [PH] (ref 5–7.5)
PH UR: 5.3 [PH] (ref 4.5–8.9)
PHOSPHATE SERPL-MCNC: 3.4 MG/DL (ref 2.8–4.1)
POTASSIUM SERPL-SCNC: 4.7 MMOL/L (ref 3.5–5.2)
PROPOXYPH UR QL SCN: NEGATIVE NG/ML
PROT SERPL-MCNC: 7.4 G/DL (ref 6–8.5)
PROT UR QL STRIP: NEGATIVE
PTH-INTACT SERPL-MCNC: 42 PG/ML (ref 15–65)
RBC #/AREA URNS HPF: NORMAL /HPF (ref 0–2)
SODIUM SERPL-SCNC: 141 MMOL/L (ref 134–144)
SP GR UR STRIP: 1.01 (ref 1–1.03)
TRIGL SERPL-MCNC: 124 MG/DL (ref 0–149)
TSH SERPL DL<=0.005 MIU/L-ACNC: 2.01 UIU/ML (ref 0.45–4.5)
URATE SERPL-MCNC: 4.3 MG/DL (ref 3.8–8.4)
URINALYSIS REFLEX: NORMAL
UROBILINOGEN UR STRIP-MCNC: 0.2 MG/DL (ref 0.2–1)
VLDLC SERPL CALC-MCNC: 22 MG/DL (ref 5–40)
WBC #/AREA URNS HPF: NORMAL /HPF (ref 0–5)

## 2023-08-14 ENCOUNTER — TELEPHONE (OUTPATIENT)
Dept: FAMILY MEDICINE CLINIC | Facility: CLINIC | Age: 73
End: 2023-08-14
Payer: MEDICARE

## 2023-08-14 NOTE — TELEPHONE ENCOUNTER
Message left on patient's answering machine regarding laboratory testing from 8/11/2023 all satisfactory:    TSH, CMP, lipid profile, hemoglobin A1c, intact PTH, phosphorus, vitamin D, uric acid, ionized calcium, urinalysis, urine microalbumin all normal, with UDS appropriately detecting opiates but no inappropriate medications detected.    Assessment/plan:  1.  Primary hyperparathyroidism/hypercalcemia status post parathyroidectomy.  Normalization of intact PTH and calcium.  2.  Remainder of laboratory testing all very satisfactory.  3.  Patient advised to contact me for any questions regarding this message.

## 2023-08-31 ENCOUNTER — OFFICE VISIT (OUTPATIENT)
Dept: ENDOCRINOLOGY | Age: 73
End: 2023-08-31
Payer: MEDICARE

## 2023-08-31 VITALS
WEIGHT: 187 LBS | SYSTOLIC BLOOD PRESSURE: 108 MMHG | HEART RATE: 77 BPM | BODY MASS INDEX: 28.34 KG/M2 | HEIGHT: 68 IN | TEMPERATURE: 96.8 F | DIASTOLIC BLOOD PRESSURE: 78 MMHG | OXYGEN SATURATION: 97 %

## 2023-08-31 DIAGNOSIS — M85.80 OSTEOPENIA, UNSPECIFIED LOCATION: ICD-10-CM

## 2023-08-31 DIAGNOSIS — E21.0 PRIMARY HYPERPARATHYROIDISM: Primary | ICD-10-CM

## 2023-08-31 NOTE — PROGRESS NOTES
Chief complaint:  Hyperparathyroidism    HPI:   - 73 year old male here with hyperparathyroidism  - He had a left inferior parathyroidectomy in 6/7/2023  - He has a history of kidney stones and osteopenia  - No complaints today    The following portions of the patient's history were reviewed and updated as appropriate: allergies, current medications, past family history, past medical history, past social history, past surgical history, and problem list.    Objective     Vitals:    08/31/23 1115   BP: 108/78   Pulse: 77   Temp: 96.8 øF (36 øC)   SpO2: 97%        Physical Exam  Constitutional:       Appearance: Normal appearance.   HENT:      Head: Normocephalic and atraumatic.   Eyes:      General: No scleral icterus.  Pulmonary:      Effort: Pulmonary effort is normal. No respiratory distress.   Neurological:      Mental Status: He is alert.      Gait: Gait normal.   Psychiatric:         Mood and Affect: Mood normal.         Behavior: Behavior normal.         Thought Content: Thought content normal.         Judgment: Judgment normal.       Labs/Imaging:  Calcium was normal in 8/2023, last DXA in 6/2023 showed a most severe T-score of -1.4 in the right femoral neck with FRAX below treatment threshold    Assessment & Plan   Primary hyperparathyroidism  - He had a left inferior parathyroidectomy in 6/7/2023  - His calcium was normal in 8/2023  - Would recommend repeat calcium every 6-12 months and would recommend repeat referral to endocrine for hypercalcemia    Osteopenia  - His FRAX is below treatment threshold  - Recommend repeat DXA in 6/2025  - Recommend 1000 IU vitamin D3 daily, weight bearing exercise as tolerated    - Return to clinic PRN

## 2023-09-08 DIAGNOSIS — G89.29 CHRONIC BILATERAL LOW BACK PAIN WITH BILATERAL SCIATICA: ICD-10-CM

## 2023-09-08 DIAGNOSIS — M54.42 CHRONIC BILATERAL LOW BACK PAIN WITH BILATERAL SCIATICA: ICD-10-CM

## 2023-09-08 DIAGNOSIS — Z00.01 ENCOUNTER FOR GENERAL ADULT MEDICAL EXAMINATION WITH ABNORMAL FINDINGS: ICD-10-CM

## 2023-09-08 DIAGNOSIS — Z79.891 CHRONICALLY ON OPIATE THERAPY: ICD-10-CM

## 2023-09-08 DIAGNOSIS — M54.41 CHRONIC BILATERAL LOW BACK PAIN WITH BILATERAL SCIATICA: ICD-10-CM

## 2023-09-08 RX ORDER — HYDROCODONE BITARTRATE AND ACETAMINOPHEN 7.5; 325 MG/1; MG/1
1 TABLET ORAL 2 TIMES DAILY
Qty: 60 TABLET | Refills: 0 | Status: SHIPPED | OUTPATIENT
Start: 2023-09-08

## 2023-09-08 NOTE — TELEPHONE ENCOUNTER
Caller: Leslie Kwon    Relationship: Emergency Contact    Best call back number:[phone]    Requested Prescriptions:   HYDROcodone-acetaminophen (NORCO) 7.5-325 MG per tablet        Pharmacy where request should be sent: [facility] #[id number] [place] [id number]  AT [facility] [phone] [facility] [phone] FX     Last office visit with prescribing clinician: 8/11/2023   Last telemedicine visit with prescribing clinician: Visit date not found   Next office visit with prescribing clinician: 11/10/2023       Does the patient have less than a 3 day supply:  [x] Yes  [] No    Would you like a call back once the refill request has been completed: [] Yes [x] No    If the office needs to give you a call back, can they leave a voicemail: [] Yes [x] No    Sanaz Montemayor Rep   09/08/23 11:27 EDT

## 2023-09-08 NOTE — TELEPHONE ENCOUNTER
His last appt in our office was on 8/11/2023. He does have his follow up scheduled for 11/10/2023. KELLY

## 2023-10-06 DIAGNOSIS — M54.41 CHRONIC BILATERAL LOW BACK PAIN WITH BILATERAL SCIATICA: ICD-10-CM

## 2023-10-06 DIAGNOSIS — Z79.891 CHRONICALLY ON OPIATE THERAPY: ICD-10-CM

## 2023-10-06 DIAGNOSIS — M54.42 CHRONIC BILATERAL LOW BACK PAIN WITH BILATERAL SCIATICA: ICD-10-CM

## 2023-10-06 DIAGNOSIS — G89.29 CHRONIC BILATERAL LOW BACK PAIN WITH BILATERAL SCIATICA: ICD-10-CM

## 2023-10-06 DIAGNOSIS — Z00.01 ENCOUNTER FOR GENERAL ADULT MEDICAL EXAMINATION WITH ABNORMAL FINDINGS: ICD-10-CM

## 2023-10-06 RX ORDER — HYDROCODONE BITARTRATE AND ACETAMINOPHEN 7.5; 325 MG/1; MG/1
1 TABLET ORAL 2 TIMES DAILY
Qty: 60 TABLET | Refills: 0 | Status: SHIPPED | OUTPATIENT
Start: 2023-10-06

## 2023-10-06 NOTE — TELEPHONE ENCOUNTER
He was last seen in our office on 8/11/2023. He does have a follow up scheduled for 11/10/2023. TF

## 2023-10-06 NOTE — TELEPHONE ENCOUNTER
Caller: Leslie Kwon    Relationship: Emergency Contact    Best call back number: [phone]     Requested Prescriptions:   Requested Prescriptions     Pending Prescriptions Disp Refills    HYDROcodone-acetaminophen (NORCO) 7.5-325 MG per tablet 60 tablet 0     Sig: Take 1 tablet by mouth 2 (Two) Times a Day.        Pharmacy where request should be sent: [facility] #[id number] [facility] [address] [facility] [phone] [facility] [phone]      Last office visit with prescribing clinician: 8/11/2023   Last telemedicine visit with prescribing clinician: Visit date not found   Next office visit with prescribing clinician: 11/10/2023     Additional details provided by patient: OUT OF MEDICATION     Does the patient have less than a 3 day supply:  [x] Yes  [] No    Would you like a call back once the refill request has been completed: [] Yes [x] No    If the office needs to give you a call back, can they leave a voicemail: [] Yes [x] No    Sanaz Nuñez Rep   10/06/23 10:52 EDT

## 2023-11-03 DIAGNOSIS — M54.41 CHRONIC BILATERAL LOW BACK PAIN WITH BILATERAL SCIATICA: ICD-10-CM

## 2023-11-03 DIAGNOSIS — Z00.01 ENCOUNTER FOR GENERAL ADULT MEDICAL EXAMINATION WITH ABNORMAL FINDINGS: ICD-10-CM

## 2023-11-03 DIAGNOSIS — Z79.891 CHRONICALLY ON OPIATE THERAPY: ICD-10-CM

## 2023-11-03 DIAGNOSIS — M54.42 CHRONIC BILATERAL LOW BACK PAIN WITH BILATERAL SCIATICA: ICD-10-CM

## 2023-11-03 DIAGNOSIS — G89.29 CHRONIC BILATERAL LOW BACK PAIN WITH BILATERAL SCIATICA: ICD-10-CM

## 2023-11-03 RX ORDER — HYDROCODONE BITARTRATE AND ACETAMINOPHEN 7.5; 325 MG/1; MG/1
1 TABLET ORAL 2 TIMES DAILY
Qty: 60 TABLET | Refills: 0 | Status: SHIPPED | OUTPATIENT
Start: 2023-11-03

## 2023-11-03 NOTE — TELEPHONE ENCOUNTER
Caller: [place] Larry W    Relationship: Self    Best call back number: [phone]    Requested Prescriptions:   Requested Prescriptions     Pending Prescriptions Disp Refills    HYDROcodone-acetaminophen (NORCO) 7.5-325 MG per tablet 60 tablet 0     Sig: Take 1 tablet by mouth 2 (Two) Times a Day.        Pharmacy where request should be sent:    [facility] [phone]  Last office visit with prescribing clinician: 8/11/2023   Last telemedicine visit with prescribing clinician: Visit date not found   Next office visit with prescribing clinician: 11/10/2023     Additional details provided by patient: PATIENT IS DUE FOR REFILL ON 11/05/2023    Does the patient have less than a 3 day supply:  [x] Yes  [] No    Would you like a call back once the refill request has been completed: [] Yes [x] No    If the office needs to give you a call back, can they leave a voicemail: [] Yes [x] No    Sanaz Plata Rep   11/03/23 11:09 EDT

## 2023-11-10 ENCOUNTER — OFFICE VISIT (OUTPATIENT)
Dept: FAMILY MEDICINE CLINIC | Facility: CLINIC | Age: 73
End: 2023-11-10
Payer: MEDICARE

## 2023-11-10 VITALS
DIASTOLIC BLOOD PRESSURE: 76 MMHG | SYSTOLIC BLOOD PRESSURE: 138 MMHG | WEIGHT: 190.8 LBS | HEART RATE: 68 BPM | BODY MASS INDEX: 28.92 KG/M2 | OXYGEN SATURATION: 97 % | TEMPERATURE: 98.8 F | HEIGHT: 68 IN

## 2023-11-10 DIAGNOSIS — I10 PRIMARY HYPERTENSION: ICD-10-CM

## 2023-11-10 DIAGNOSIS — Z86.39 HISTORY OF PRIMARY HYPERPARATHYROIDISM: ICD-10-CM

## 2023-11-10 DIAGNOSIS — R35.1 BPH ASSOCIATED WITH NOCTURIA: ICD-10-CM

## 2023-11-10 DIAGNOSIS — G89.29 CHRONIC BILATERAL LOW BACK PAIN WITH LEFT-SIDED SCIATICA: Primary | ICD-10-CM

## 2023-11-10 DIAGNOSIS — N40.1 BPH ASSOCIATED WITH NOCTURIA: ICD-10-CM

## 2023-11-10 DIAGNOSIS — M54.42 CHRONIC BILATERAL LOW BACK PAIN WITH LEFT-SIDED SCIATICA: Primary | ICD-10-CM

## 2023-11-10 PROCEDURE — 1160F RVW MEDS BY RX/DR IN RCRD: CPT | Performed by: INTERNAL MEDICINE

## 2023-11-10 PROCEDURE — 3078F DIAST BP <80 MM HG: CPT | Performed by: INTERNAL MEDICINE

## 2023-11-10 PROCEDURE — 3075F SYST BP GE 130 - 139MM HG: CPT | Performed by: INTERNAL MEDICINE

## 2023-11-10 PROCEDURE — 99214 OFFICE O/P EST MOD 30 MIN: CPT | Performed by: INTERNAL MEDICINE

## 2023-11-10 PROCEDURE — 1159F MED LIST DOCD IN RCRD: CPT | Performed by: INTERNAL MEDICINE

## 2023-11-10 RX ORDER — TAMSULOSIN HYDROCHLORIDE 0.4 MG/1
1 CAPSULE ORAL DAILY
Qty: 90 CAPSULE | Refills: 3 | Status: SHIPPED | OUTPATIENT
Start: 2023-11-10

## 2023-11-10 NOTE — PROGRESS NOTES
Follow Up Office Visit      Date: 11/10/2023   Patient Name: Larry Kwon  : 1950   MRN: [id number]     Chief Complaint:    Chief Complaint   Patient presents with    Follow-up       History of Present Illness: Larry Kwon is a 73 y.o. male who is here today for 3-month review of his chronic back pain, with intermittent left-sided sciatica, for which he takes Norco 7.5 mg prescribed twice daily, averaging 60 tablets over the course of a month, indicating baseline back pain is rated as a 2-3 on a scale of 10 with exacerbations at least once or twice weekly up to a 7 or 8 on a scale of 10.  His Norco does improve his quality life with no significant side effects.  His controlled substance agreement and urine drug screen is up-to-date and his [place] screen is appropriate.  He also has a history of hypertension taking amlodipine 5 mg daily and losartan 100 mg daily with blood pressures averaging low 130s over 70s checked weekly, denying chest pains palpitations dyspnea dizziness or edema.  He does have a history of primary hyperparathyroidism status post parathyroidectomy earlier this year, with most recent ionized calcium 5.2 and intact PTH 42, both normal in 2023.  Last acute issue to discuss is note of nocturia at least once nightly, with variable but generally decreased stream noted more so at nighttime, and some postvoid dribbling, no hematuria or dysuria.  Patient does restrict fluids 2 to 3 hours before bedtime.  He does routinely see a urologist given history of nephrolithiasis.  Not currently taking any prostate related meds.  Patient indicates he is current with his flu vaccine and COVID-19 vaccine booster.  Has had the original Zostavax but not the most recent Shingrix vaccine series for varicella-zoster prevention.    Subjective      Review of Systems:   Review of Systems    I have reviewed the patients family history, social history, past medical history, past surgical history and have  "updated it as appropriate.     Medications:     Current Outpatient Medications:     allopurinol (ZYLOPRIM) 100 MG tablet, Take 1 tablet by mouth Daily., Disp: 90 tablet, Rfl: 3    amLODIPine (NORVASC) 5 MG tablet, Take 1 tablet by mouth Daily., Disp: 90 tablet, Rfl: 3    atorvastatin (LIPITOR) 20 MG tablet, Take 1 tablet by mouth Daily., Disp: 90 tablet, Rfl: 3    Cholecalciferol (Vitamin D) 50 MCG (2000 UT) tablet, Take 1 tablet by mouth Daily., Disp: 90 tablet, Rfl: 3    clobetasol (CLOBEX) 0.05 % lotion, Apply  topically to the appropriate area as directed 2 (Two) Times a Day., Disp: 59 mL, Rfl: 1    HYDROcodone-acetaminophen (NORCO) 7.5-325 MG per tablet, Take 1 tablet by mouth 2 (Two) Times a Day., Disp: 60 tablet, Rfl: 0    latanoprost (XALATAN) 0.005 % ophthalmic solution, , Disp: , Rfl:     losartan (COZAAR) 100 MG tablet, Take 1 tablet by mouth Daily., Disp: 90 tablet, Rfl: 3    montelukast (SINGULAIR) 10 MG tablet, Take 1 tablet by mouth Every Night., Disp: 90 tablet, Rfl: 3    tamsulosin (FLOMAX) 0.4 MG capsule 24 hr capsule, Take 1 capsule by mouth Daily., Disp: 90 capsule, Rfl: 3    Allergies:   Allergies   Allergen Reactions    Ace Inhibitors Cough    Chlorhexidine Rash     Likely to be chloroprep, not necessarily chlorhexidine       Objective     Physical Exam: Please see above  Vital Signs:   Vitals:    11/10/23 1246   BP: 138/76   BP Location: Left arm   Patient Position: Sitting   Cuff Size: Adult   Pulse: 68   Temp: 98.8 °F (37.1 °C)   TempSrc: Temporal   SpO2: 97%   Weight: 86.5 kg (190 lb 12.8 oz)   Height: 172.7 cm (68\")     Body mass index is 29.01 kg/m².          Physical Exam  General: Always a very pleasant well spoken 73-year-old male who is in no acute distress, BMI of 29.0.  Lungs clear  Cardiac regular rate rhythm with no murmurs gallops or rubs, no dependent edema  Musculoskeletal exam reveals no current palpable back pain, straight leg raise bilaterally in the seated position negative " for sciatica, knee jerks noted to be 2+ on the right, difficult to elicit on the left, similarly right ankle jerk 2+, left ankle jerk 1+, with normal strength and sensation in his lower extremities  Procedures    Results:   Labs:   Hemoglobin A1C   Date Value Ref Range Status   08/11/2023 5.5 4.8 - 5.6 % Final     Comment:              Prediabetes: 5.7 - 6.4           Diabetes: >6.4           Glycemic control for adults with diabetes: <7.0     05/19/2023 5.6 % Final   11/04/2021 5.4 4.3 - 5.6 % Final     Comment:     (note)  A1C% Reference Range:  4.3 - 5.6  Normal range  5.7 - 6.4  Pre-diabetic -increased risk for developing diabetes  mellitus.  >=6.5      Diabetic -diagnostic of diabetes mellitus.    Note: For diagnosis of diabetes in individuals without unequivocal  hyperglycemia, results should be confirmed by repeat testing.  Patients with conditions that shorten erythrocyte survival, such as  recovery from acute blood loss, hemolytic anemia, kidney disease,  or the presence of unstable hemogloblins like HbSS, HbCC, and HbSC  may yield falsely decreased HbA1c test results. Iron deficiency may  yield falsely increased HbA1c test results.     TSH   Date Value Ref Range Status   08/11/2023 2.010 0.450 - 4.500 uIU/mL Final   10/30/2020 1.460 0.470 - 4.680 u(iU)/mL Final     Comment:     (note)  Higher dose biotin supplements may interfere with this test.  Interpret results within the context of patient's clinical picture.        POCT Results (if applicable):   Results for orders placed or performed in visit on 08/11/23   Calcium, Ionized    Specimen: Arm, Right; Blood    Blood  Release to bruno   Result Value Ref Range    Ionized Calcium 5.2 4.5 - 5.6 mg/dL   Uric Acid    Specimen: Arm, Right; Blood    Blood  Release to bruno   Result Value Ref Range    Uric Acid 4.3 3.8 - 8.4 mg/dL   Vitamin D,25-Hydroxy    Specimen: Arm, Right; Blood    Blood  Release to bruno   Result Value Ref Range    25 Hydroxy, Vitamin D 37.2  30.0 - 100.0 ng/mL   Phosphorus    Specimen: Arm, Right; Blood    Blood  Release to bruno   Result Value Ref Range    Phosphorus 3.4 2.8 - 4.1 mg/dL   PTH, Intact    Specimen: Arm, Right; Blood    Blood  Release to bruno   Result Value Ref Range    PTH, Intact 42 15 - 65 pg/mL   Hemoglobin A1c    Specimen: Arm, Right; Blood    Blood  Release to bruno   Result Value Ref Range    Hemoglobin A1C 5.5 4.8 - 5.6 %   Lipid Panel    Specimen: Arm, Right; Blood    Blood  Release to bruno   Result Value Ref Range    Total Cholesterol 170 100 - 199 mg/dL    Triglycerides 124 0 - 149 mg/dL    HDL Cholesterol 65 >39 mg/dL    VLDL Cholesterol Dhruv 22 5 - 40 mg/dL    LDL Chol Calc ([facility]) 83 0 - 99 mg/dL   Comprehensive Metabolic Panel    Specimen: Arm, Right; Blood    Blood  Release to bruno   Result Value Ref Range    Glucose 88 70 - 99 mg/dL    BUN 12 8 - 27 mg/dL    Creatinine 1.15 0.76 - 1.27 mg/dL    EGFR Result 66 >59 mL/min/1.73    BUN/Creatinine Ratio 10 10 - 24    Sodium 141 134 - 144 mmol/L    Potassium 4.7 3.5 - 5.2 mmol/L    Chloride 102 96 - 106 mmol/L    Total CO2 23 20 - 29 mmol/L    Calcium 9.9 8.6 - 10.2 mg/dL    Total Protein 7.4 6.0 - 8.5 g/dL    Albumin 4.7 3.8 - 4.8 g/dL    Globulin 2.7 1.5 - 4.5 g/dL    A/G Ratio 1.7 1.2 - 2.2    Total Bilirubin 1.0 0.0 - 1.2 mg/dL    Alkaline Phosphatase 125 (H) 44 - 121 IU/L    AST (SGOT) 26 0 - 40 IU/L    ALT (SGPT) 21 0 - 44 IU/L   TSH Rfx On Abnormal To Free T4    Specimen: Arm, Right; Blood    Blood  Release to bruno   Result Value Ref Range    TSH 2.010 0.450 - 4.500 uIU/mL   Urine Drug Screen - Urine, Clean Catch    Specimen: Urine, Clean Catch    Urine  Release to bruno   Result Value Ref Range    Amphetamine, Urine Qual Negative Ftunie=0147 ng/mL    Barbiturates Screen, Urine Negative Kmdpmk=853 ng/mL    Benzodiazepine Screen, Urine Negative Dxzenr=830 ng/mL    THC Screen, Urine Negative Cutoff=20 ng/mL    Cocaine Screen, Urine Negative Sepmhm=412 ng/mL    Opiate Screen,  Urine Positive (A) Gtdbsx=531 ng/mL    Oxycodone/Oxymorphone, Urine Negative Dofzqn=712 ng/mL    Phencyclidine (PCP), Urine Negative Cutoff=25 ng/mL    Methadone Screen, Urine Negative Ewpnsu=744 ng/mL    Propoxyphene Screen Negative Grmdsx=886 ng/mL    Creatinine, Urine 127.6 20.0 - 300.0 mg/dL    pH, UA 5.3 4.5 - 8.9    Please note Comment    MicroAlbumin, Urine, Random - Urine, Clean Catch    Specimen: Urine, Clean Catch    Urine  Release to bruno   Result Value Ref Range    Microalbumin, Urine 21.9 Not Estab. ug/mL   Urinalysis With Culture If Indicated - Urine, Clean Catch    Specimen: Urine, Clean Catch    Urine  Release to bruno   Result Value Ref Range    Specific Gravity, UA 1.015 1.005 - 1.030    pH, UA 6.0 5.0 - 7.5    Color, UA Yellow Yellow    Appearance, UA Clear Clear    Leukocytes, UA Negative Negative    Protein Negative Negative/Trace    Glucose, UA Negative Negative    Ketones Negative Negative    Blood, UA Negative Negative    Bilirubin, UA Negative Negative    Urobilinogen, UA 0.2 0.2 - 1.0 mg/dL    Nitrite, UA Negative Negative    Microscopic Examination Comment     Microscopic Examination See below:     Urinalysis Reflex Comment    Microscopic Examination -    Urine  Release to bruno   Result Value Ref Range    WBC, UA None seen 0 - 5 /hpf    RBC, UA None seen 0 - 2 /hpf    Epithelial Cells (non renal) None seen 0 - 10 /hpf    Casts None seen None seen /lpf    Bacteria, UA None seen None seen/Few       Assessment / Plan      Assessment/Plan:   Diagnoses and all orders for this visit:    1. Chronic bilateral low back pain with left-sided sciatica (Primary)  Chronically stable taking Norco 7.5 mg prescribed twice daily, averaging 60 tablets weekly though taking variably throughout the week depending on his degree of pain.  He does have an improved quality life on this regimen with no significant side effects, controlled substance agreement, [place] screen and urine drug screens all appropriate.   Does not require refill at this time.  2. Primary hypertension  Satisfactory blood pressure control acutely as well as chronically taking amlodipine 5 mg daily and losartan 100 mg daily.  Continue current regimen with monitoring, along with healthy lifestyle efforts.  3. History of primary hyperparathyroidism  Status post parathyroidectomy earlier this year, most recent ionized calcium and intact PTH both normal in 8/2023.  Plan repeat calcium at least every 6 to 12 months.  4. BPH associated with nocturia  -     tamsulosin (FLOMAX) 0.4 MG capsule 24 hr capsule; Take 1 capsule by mouth Daily.  Dispense: 90 capsule; Refill: 3  Progressive BPH symptoms with decreased stream, postvoid dribbling and nocturia.  Discussed pathophysiology of this disorder.  Initiate trial of tamsulosin 0.4 mg daily.  Does follow-up regularly with urology.    Patient indicates he has had his COVID-19 and influenza vaccines, with me also recommended that he consider obtaining the Shingrix vaccine series outside the office.    Follow Up:   Return in about 3 months (around 2/10/2024) for Recheck.      At [facility], we believe that sharing information builds trust and better relationships. You are receiving this note because you recently visited [facility]. It is possible you will see health information before a provider has talked with you about it. This kind of information can be easy to misunderstand. To help you fully understand what it means for your health, we urge you to discuss this note with your provider.    Jf Lassiter MD  [facility]

## 2023-12-05 DIAGNOSIS — G89.29 CHRONIC BILATERAL LOW BACK PAIN WITH BILATERAL SCIATICA: ICD-10-CM

## 2023-12-05 DIAGNOSIS — Z00.01 ENCOUNTER FOR GENERAL ADULT MEDICAL EXAMINATION WITH ABNORMAL FINDINGS: ICD-10-CM

## 2023-12-05 DIAGNOSIS — Z79.891 CHRONICALLY ON OPIATE THERAPY: ICD-10-CM

## 2023-12-05 DIAGNOSIS — M54.42 CHRONIC BILATERAL LOW BACK PAIN WITH BILATERAL SCIATICA: ICD-10-CM

## 2023-12-05 DIAGNOSIS — M54.41 CHRONIC BILATERAL LOW BACK PAIN WITH BILATERAL SCIATICA: ICD-10-CM

## 2023-12-05 RX ORDER — HYDROCODONE BITARTRATE AND ACETAMINOPHEN 7.5; 325 MG/1; MG/1
1 TABLET ORAL 2 TIMES DAILY
Qty: 60 TABLET | Refills: 0 | Status: SHIPPED | OUTPATIENT
Start: 2023-12-05 | End: 2023-12-08

## 2023-12-05 NOTE — TELEPHONE ENCOUNTER
Caller: Leslie Kwon    Relationship: Emergency Contact    Best call back number: [phone]    Requested Prescriptions:   Requested Prescriptions      No prescriptions requested or ordered in this encounter      HYDROcodone-acetaminophen (NORCO) 7.5-325 MG per tablet     Pharmacy where request should be sent:      [facility] #[id number] [place], [place] - [address] AT [facility] [phone] [facility] [phone] FX     Last office visit with prescribing clinician: 11/10/2023   Last telemedicine visit with prescribing clinician: Visit date not found   Next office visit with prescribing clinician: 2/9/2024     Additional details provided by patient:     COMPLETELY OUT OF MEDICATION     Does the patient have less than a 3 day supply:  [x] Yes  [] No    Would you like a call back once the refill request has been completed: [] Yes [x] No    If the office needs to give you a call back, can they leave a voicemail: [] Yes [x] No    Madelaine Bolton, PCT   12/05/23 09:17 EST

## 2023-12-08 ENCOUNTER — TELEPHONE (OUTPATIENT)
Dept: FAMILY MEDICINE CLINIC | Facility: CLINIC | Age: 73
End: 2023-12-08
Payer: MEDICARE

## 2023-12-08 DIAGNOSIS — Z79.891 CHRONICALLY ON OPIATE THERAPY: Primary | ICD-10-CM

## 2023-12-08 DIAGNOSIS — M54.42 CHRONIC BILATERAL LOW BACK PAIN WITH BILATERAL SCIATICA: ICD-10-CM

## 2023-12-08 DIAGNOSIS — G89.29 CHRONIC BILATERAL LOW BACK PAIN WITH BILATERAL SCIATICA: ICD-10-CM

## 2023-12-08 DIAGNOSIS — M54.41 CHRONIC BILATERAL LOW BACK PAIN WITH BILATERAL SCIATICA: ICD-10-CM

## 2023-12-08 RX ORDER — HYDROCODONE BITARTRATE AND ACETAMINOPHEN 5; 325 MG/1; MG/1
TABLET ORAL
Qty: 90 TABLET | Refills: 0 | Status: SHIPPED | OUTPATIENT
Start: 2023-12-08

## 2023-12-08 NOTE — TELEPHONE ENCOUNTER
Caller: CherLeslie    Relationship: Emergency Contact    Best call back number: [phone]    Which medication are you concerned about: HYDROCODONE 7.5    Who prescribed you this medication: CRUZ BOWER     What are your concerns: THE CALLER STATES THAT THE PATIENT HAS BEEN WITHOUT HIS MEDICATION FOR A WEEK THE CALLER STATES THAT THE PATI[facility] THE 7.5MG DOSAGE THEY HAVE THE OTHER DOSAGES OF THE MEDICATION BUT THEY WOULD NEED A NEW PRESCRIPTION BEFORE  THEY CAN FILL ANOTHER DOSAGE THE CALLER WOULD LIKE TO KNOW IF ANOTHER DOSE CAN BE CALLED IN

## 2023-12-08 NOTE — TELEPHONE ENCOUNTER
Phone conversation with patient who indicates that he is unable to obtain the regular Norco 7.5 mg twice daily prescription that he has been taking chronically, given shortage of that particular dose.  He is attempted over the last week to do so.  Plan will be to switch his Norco 7.5 mg twice daily, #60, over to Norco 5 mg, to take 1.5 tablets twice daily, #90.  Advise if problems.

## 2024-01-05 DIAGNOSIS — M54.42 CHRONIC BILATERAL LOW BACK PAIN WITH BILATERAL SCIATICA: ICD-10-CM

## 2024-01-05 DIAGNOSIS — G89.29 CHRONIC BILATERAL LOW BACK PAIN WITH BILATERAL SCIATICA: ICD-10-CM

## 2024-01-05 DIAGNOSIS — Z79.891 CHRONICALLY ON OPIATE THERAPY: ICD-10-CM

## 2024-01-05 DIAGNOSIS — M54.41 CHRONIC BILATERAL LOW BACK PAIN WITH BILATERAL SCIATICA: ICD-10-CM

## 2024-01-05 RX ORDER — HYDROCODONE BITARTRATE AND ACETAMINOPHEN 5; 325 MG/1; MG/1
TABLET ORAL
Qty: 90 TABLET | Refills: 0 | Status: SHIPPED | OUTPATIENT
Start: 2024-01-05

## 2024-02-02 DIAGNOSIS — G89.29 CHRONIC BILATERAL LOW BACK PAIN WITH BILATERAL SCIATICA: ICD-10-CM

## 2024-02-02 DIAGNOSIS — M54.41 CHRONIC BILATERAL LOW BACK PAIN WITH BILATERAL SCIATICA: ICD-10-CM

## 2024-02-02 DIAGNOSIS — M54.42 CHRONIC BILATERAL LOW BACK PAIN WITH BILATERAL SCIATICA: ICD-10-CM

## 2024-02-02 DIAGNOSIS — Z79.891 CHRONICALLY ON OPIATE THERAPY: ICD-10-CM

## 2024-02-02 RX ORDER — HYDROCODONE BITARTRATE AND ACETAMINOPHEN 5; 325 MG/1; MG/1
TABLET ORAL
Qty: 90 TABLET | Refills: 0 | Status: SHIPPED | OUTPATIENT
Start: 2024-02-02

## 2024-02-02 NOTE — TELEPHONE ENCOUNTER
Caller: Leslie Kwon    Relationship: Emergency Contact    Best call back number: [phone]     Requested Prescriptions:   Requested Prescriptions     Pending Prescriptions Disp Refills    HYDROcodone-acetaminophen (Norco) 5-325 MG per tablet 90 tablet 0     Si.5 tablets twice daily as needed for pain      Pharmacy where request should be sent: [facility] #[id number] [address]  AT [facility] [phone] [facility] [phone] FX     Last office visit with prescribing clinician: 11/10/2023   Last telemedicine visit with prescribing clinician: Visit date not found   Next office visit with prescribing clinician: 2024     Does the patient have less than a 3 day supply:  [x] Yes  [] No    Sanaz Paul Rep   24 11:01 EST

## 2024-02-09 ENCOUNTER — OFFICE VISIT (OUTPATIENT)
Dept: FAMILY MEDICINE CLINIC | Facility: CLINIC | Age: 74
End: 2024-02-09
Payer: MEDICARE

## 2024-02-09 VITALS
DIASTOLIC BLOOD PRESSURE: 84 MMHG | WEIGHT: 194.4 LBS | HEART RATE: 78 BPM | SYSTOLIC BLOOD PRESSURE: 132 MMHG | OXYGEN SATURATION: 98 % | BODY MASS INDEX: 29.46 KG/M2 | HEIGHT: 68 IN | TEMPERATURE: 98.1 F

## 2024-02-09 DIAGNOSIS — R35.1 BENIGN PROSTATIC HYPERPLASIA WITH NOCTURIA: ICD-10-CM

## 2024-02-09 DIAGNOSIS — I10 BENIGN HYPERTENSION: ICD-10-CM

## 2024-02-09 DIAGNOSIS — J68.3 REACTIVE AIRWAYS DYSFUNCTION SYNDROME: ICD-10-CM

## 2024-02-09 DIAGNOSIS — G89.29 CHRONIC BILATERAL LOW BACK PAIN WITH LEFT-SIDED SCIATICA: Primary | ICD-10-CM

## 2024-02-09 DIAGNOSIS — N40.1 BENIGN PROSTATIC HYPERPLASIA WITH NOCTURIA: ICD-10-CM

## 2024-02-09 DIAGNOSIS — M54.42 CHRONIC BILATERAL LOW BACK PAIN WITH LEFT-SIDED SCIATICA: Primary | ICD-10-CM

## 2024-02-09 PROCEDURE — 1159F MED LIST DOCD IN RCRD: CPT | Performed by: INTERNAL MEDICINE

## 2024-02-09 PROCEDURE — 3079F DIAST BP 80-89 MM HG: CPT | Performed by: INTERNAL MEDICINE

## 2024-02-09 PROCEDURE — 3075F SYST BP GE 130 - 139MM HG: CPT | Performed by: INTERNAL MEDICINE

## 2024-02-09 PROCEDURE — 99214 OFFICE O/P EST MOD 30 MIN: CPT | Performed by: INTERNAL MEDICINE

## 2024-02-09 PROCEDURE — 1160F RVW MEDS BY RX/DR IN RCRD: CPT | Performed by: INTERNAL MEDICINE

## 2024-02-09 RX ORDER — TRAVOPROST OPHTHALMIC SOLUTION 0.04 MG/ML
1 SOLUTION OPHTHALMIC DAILY
COMMUNITY

## 2024-02-09 RX ORDER — PREDNISONE 20 MG/1
20 TABLET ORAL 2 TIMES DAILY
Qty: 10 TABLET | Refills: 0 | Status: SHIPPED | OUTPATIENT
Start: 2024-02-09 | End: 2024-02-14

## 2024-02-09 NOTE — ASSESSMENT & PLAN NOTE
Chronic lumbago with intermittent left-sided sciatica, variable in intensity and symptoms, overall improved quality life taking Norco 7.5 mg prescribed twice daily but has not taken more variably, sometimes 3-4 times daily sometimes not at all pending on the degree of his back pain.  His [place] screen is currently appropriate as always, and he is up-to-date with his UDS from 8/2023 and controlled substance agreement from 5/2024.  Does not require refill of his [place] today.  Patient is being treated for reactive airways with prednisone which should also benefit his back pain and sciatica.  Follow-up in 3 months for review.

## 2024-02-09 NOTE — ASSESSMENT & PLAN NOTE
Satisfactory blood pressure control acutely as well as by history chronically taking amlodipine/losartan 5/100 mg daily.  Continue current regimen with regular monitoring.

## 2024-02-09 NOTE — ASSESSMENT & PLAN NOTE
Patient describes history of 5 to 6 weeks of a lingering congested cough initially with malaise but has had gradual improvement to the point he has had almost complete resolution of symptoms other than residual mild cough.  Clinical exam reveals evidence of reactive airways with rhonchi and wheezing, and for this we will treat with prednisone as prescribed.  Advise if symptoms not completely resolving.

## 2024-02-09 NOTE — PROGRESS NOTES
Answers submitted by the patient for this visit:  Primary Reason for Visit (Submitted on 2024)  What is the primary reason for your visit?: Cough  Cough Questionnaire (Submitted on 2024)  Chief Complaint: Cough  Chronicity: new  Onset: more than 1 month ago  Progression since onset: improving  Frequency: every few hours  Cough characteristics: productive of clear sputum  ear congestion: No  headaches: No  heartburn: No  hemoptysis: No  nasal congestion: Yes  sweats: No  Risk factors for lung disease: animal exposure      Follow Up Office Visit      Date: 2024   Patient Name: Larry Kwon  : 1950   MRN: [id number]     Chief Complaint:    Chief Complaint   Patient presents with    Follow-up       History of Present Illness: Larry Kwon is a 73 y.o. male who is here today for routine 3-month review, given chronic opiate therapy in the form of Norco 7.5 mg twice daily prescribed for chronic back pain with intermittent left-sided sciatica.  He relates on his current regimen, which he takes variably, sometimes 3 times daily and sometimes not at all, his pain level rates anywhere from a 0-7 or 8 on a scale of 10, currently at not having a lot of low back pain but having some sharp pain in his left thigh.  There is no associated numbness no weakness, no loss of bowel or bladder control.  Overall the Norco has improved his quality of life with no significant side effects.  UDS and controlled substance agreement up-to-date from 2023 and 2023 respectively.  Also has had recent 5 or 6-week history of a hacking congested cough that is slowly improving near resolution but still slight persistence.  No fevers or chills, no dyspnea.  Did have some malaise but this is improving.  He has hypertension taking amlodipine/losartan 5/100 mg daily with blood pressures averaging 120s over 70s, BPH with nocturia improved with addition of Flomax 0.4 mg daily but still having some postvoid dribbling with plan  3 urology to have him undergo some urological procedure..    Subjective      Review of Systems:   Review of Systems    I have reviewed the patients family history, social history, past medical history, past surgical history and have updated it as appropriate.     Medications:     Current Outpatient Medications:     allopurinol (ZYLOPRIM) 100 MG tablet, Take 1 tablet by mouth Daily., Disp: 90 tablet, Rfl: 3    amLODIPine (NORVASC) 5 MG tablet, Take 1 tablet by mouth Daily., Disp: 90 tablet, Rfl: 3    atorvastatin (LIPITOR) 20 MG tablet, Take 1 tablet by mouth Daily., Disp: 90 tablet, Rfl: 3    Cholecalciferol (Vitamin D) 50 MCG (2000 UT) tablet, Take 1 tablet by mouth Daily., Disp: 90 tablet, Rfl: 3    clobetasol (CLOBEX) 0.05 % lotion, Apply  topically to the appropriate area as directed 2 (Two) Times a Day., Disp: 59 mL, Rfl: 1    HYDROcodone-acetaminophen (Norco) 5-325 MG per tablet, 1.5 tablets twice daily as needed for pain, Disp: 90 tablet, Rfl: 0    latanoprost (XALATAN) 0.005 % ophthalmic solution, , Disp: , Rfl:     losartan (COZAAR) 100 MG tablet, Take 1 tablet by mouth Daily., Disp: 90 tablet, Rfl: 3    montelukast (SINGULAIR) 10 MG tablet, Take 1 tablet by mouth Every Night., Disp: 90 tablet, Rfl: 3    tamsulosin (FLOMAX) 0.4 MG capsule 24 hr capsule, Take 1 capsule by mouth Daily., Disp: 90 capsule, Rfl: 3    travoprost, BAK free, (TRAVATAN) 0.004 % solution ophthalmic solution, Apply 1 drop to eye(s) as directed by provider Daily., Disp: , Rfl:     predniSONE (DELTASONE) 20 MG tablet, Take 1 tablet by mouth 2 (Two) Times a Day for 5 days., Disp: 10 tablet, Rfl: 0    Allergies:   Allergies   Allergen Reactions    Ace Inhibitors Cough    Chlorhexidine Rash     Likely to be chloroprep, not necessarily chlorhexidine       Objective     Physical Exam: Please see above  Vital Signs:   Vitals:    02/09/24 1300   BP: 132/84   BP Location: Left arm   Patient Position: Sitting   Cuff Size: Adult   Pulse: 78  "  Temp: 98.1 °F (36.7 °C)   TempSrc: Temporal   SpO2: 98%   Weight: 88.2 kg (194 lb 6.4 oz)   Height: 172.7 cm (68\")     Body mass index is 29.56 kg/m².          Physical Exam  General: Always a very pleasant healthy-appearing 73-year-old male, well-spoken, no acute distress.  Lungs with scattered fine forced expiratory wheezes and rhonchi, no focal consolidation, no resting tachypnea dyspnea or cough  Cardiac regular rate rhythm with no murmurs gallops or rubs  Musculoskeletal send with no current paralumbar tenderness to palpation, negative straight leg raise bilaterally in a seated position, chronically absent left knee jerk, 2+ right knee jerk and 2+ bilateral and symmetric ankle jerks, with normal strength and sensation in his lower extremities, at baseline  Procedures    Results:   Labs:   Hemoglobin A1C   Date Value Ref Range Status   08/11/2023 5.5 4.8 - 5.6 % Final     Comment:              Prediabetes: 5.7 - 6.4           Diabetes: >6.4           Glycemic control for adults with diabetes: <7.0     05/19/2023 5.6 % Final   11/04/2021 5.4 4.3 - 5.6 % Final     Comment:     (note)  A1C% Reference Range:  4.3 - 5.6  Normal range  5.7 - 6.4  Pre-diabetic -increased risk for developing diabetes  mellitus.  >=6.5      Diabetic -diagnostic of diabetes mellitus.    Note: For diagnosis of diabetes in individuals without unequivocal  hyperglycemia, results should be confirmed by repeat testing.  Patients with conditions that shorten erythrocyte survival, such as  recovery from acute blood loss, hemolytic anemia, kidney disease,  or the presence of unstable hemogloblins like HbSS, HbCC, and HbSC  may yield falsely decreased HbA1c test results. Iron deficiency may  yield falsely increased HbA1c test results.     TSH   Date Value Ref Range Status   08/11/2023 2.010 0.450 - 4.500 uIU/mL Final   10/30/2020 1.460 0.470 - 4.680 u(iU)/mL Final     Comment:     (note)  Higher dose biotin supplements may interfere with this " test.  Interpret results within the context of patient's clinical picture.        POCT Results (if applicable):   Results for orders placed or performed in visit on 08/11/23   Calcium, Ionized    Specimen: Arm, Right; Blood    Blood  Release to bruno   Result Value Ref Range    Ionized Calcium 5.2 4.5 - 5.6 mg/dL   Uric Acid    Specimen: Arm, Right; Blood    Blood  Release to bruno   Result Value Ref Range    Uric Acid 4.3 3.8 - 8.4 mg/dL   Vitamin D,25-Hydroxy    Specimen: Arm, Right; Blood    Blood  Release to bruno   Result Value Ref Range    25 Hydroxy, Vitamin D 37.2 30.0 - 100.0 ng/mL   Phosphorus    Specimen: Arm, Right; Blood    Blood  Release to bruno   Result Value Ref Range    Phosphorus 3.4 2.8 - 4.1 mg/dL   PTH, Intact    Specimen: Arm, Right; Blood    Blood  Release to bruno   Result Value Ref Range    PTH, Intact 42 15 - 65 pg/mL   Hemoglobin A1c    Specimen: Arm, Right; Blood    Blood  Release to bruno   Result Value Ref Range    Hemoglobin A1C 5.5 4.8 - 5.6 %   Lipid Panel    Specimen: Arm, Right; Blood    Blood  Release to bruno   Result Value Ref Range    Total Cholesterol 170 100 - 199 mg/dL    Triglycerides 124 0 - 149 mg/dL    HDL Cholesterol 65 >39 mg/dL    VLDL Cholesterol Dhruv 22 5 - 40 mg/dL    LDL Chol Calc (NIH) 83 0 - 99 mg/dL   Comprehensive Metabolic Panel    Specimen: Arm, Right; Blood    Blood  Release to bruno   Result Value Ref Range    Glucose 88 70 - 99 mg/dL    BUN 12 8 - 27 mg/dL    Creatinine 1.15 0.76 - 1.27 mg/dL    EGFR Result 66 >59 mL/min/1.73    BUN/Creatinine Ratio 10 10 - 24    Sodium 141 134 - 144 mmol/L    Potassium 4.7 3.5 - 5.2 mmol/L    Chloride 102 96 - 106 mmol/L    Total CO2 23 20 - 29 mmol/L    Calcium 9.9 8.6 - 10.2 mg/dL    Total Protein 7.4 6.0 - 8.5 g/dL    Albumin 4.7 3.8 - 4.8 g/dL    Globulin 2.7 1.5 - 4.5 g/dL    A/G Ratio 1.7 1.2 - 2.2    Total Bilirubin 1.0 0.0 - 1.2 mg/dL    Alkaline Phosphatase 125 (H) 44 - 121 IU/L    AST (SGOT) 26 0 - 40 IU/L    ALT  (SGPT) 21 0 - 44 IU/L   TSH Rfx On Abnormal To Free T4    Specimen: Arm, Right; Blood    Blood  Release to bruno   Result Value Ref Range    TSH 2.010 0.450 - 4.500 uIU/mL   Urine Drug Screen - Urine, Clean Catch    Specimen: Urine, Clean Catch    Urine  Release to bruno   Result Value Ref Range    Amphetamine, Urine Qual Negative Jwmlhq=2332 ng/mL    Barbiturates Screen, Urine Negative Gvnilz=421 ng/mL    Benzodiazepine Screen, Urine Negative Nbdfxk=135 ng/mL    THC Screen, Urine Negative Cutoff=20 ng/mL    Cocaine Screen, Urine Negative Uncuok=141 ng/mL    Opiate Screen, Urine Positive (A) Nwvqqi=709 ng/mL    Oxycodone/Oxymorphone, Urine Negative Djuljm=296 ng/mL    Phencyclidine (PCP), Urine Negative Cutoff=25 ng/mL    Methadone Screen, Urine Negative Ohippy=194 ng/mL    Propoxyphene Screen Negative Bprwgf=265 ng/mL    Creatinine, Urine 127.6 20.0 - 300.0 mg/dL    pH, UA 5.3 4.5 - 8.9    Please note Comment    MicroAlbumin, Urine, Random - Urine, Clean Catch    Specimen: Urine, Clean Catch    Urine  Release to bruno   Result Value Ref Range    Microalbumin, Urine 21.9 Not Estab. ug/mL   Urinalysis With Culture If Indicated - Urine, Clean Catch    Specimen: Urine, Clean Catch    Urine  Release to bruno   Result Value Ref Range    Specific Gravity, UA 1.015 1.005 - 1.030    pH, UA 6.0 5.0 - 7.5    Color, UA Yellow Yellow    Appearance, UA Clear Clear    Leukocytes, UA Negative Negative    Protein Negative Negative/Trace    Glucose, UA Negative Negative    Ketones Negative Negative    Blood, UA Negative Negative    Bilirubin, UA Negative Negative    Urobilinogen, UA 0.2 0.2 - 1.0 mg/dL    Nitrite, UA Negative Negative    Microscopic Examination Comment     Microscopic Examination See below:     Urinalysis Reflex Comment    Microscopic Examination -    Urine  Release to bruno   Result Value Ref Range    WBC, UA None seen 0 - 5 /hpf    RBC, UA None seen 0 - 2 /hpf    Epithelial Cells (non renal) None seen 0 - 10 /hpf     Casts None seen None seen /lpf    Bacteria, UA None seen None seen/Few       Imaging:   No valid procedures specified.       Assessment / Plan      Assessment/Plan:   Diagnoses and all orders for this visit:    1. Chronic bilateral low back pain with left-sided sciatica (Primary)  Assessment & Plan:  Chronic lumbago with intermittent left-sided sciatica, variable in intensity and symptoms, overall improved quality life taking Norco 7.5 mg prescribed twice daily but has not taken more variably, sometimes 3-4 times daily sometimes not at all pending on the degree of his back pain.  His [place] screen is currently appropriate as always, and he is up-to-date with his UDS from 8/2023 and controlled substance agreement from 5/2024.  Does not require refill of his [place] today.  Patient is being treated for reactive airways with prednisone which should also benefit his back pain and sciatica.  Follow-up in 3 months for review.    Orders:  -     predniSONE (DELTASONE) 20 MG tablet; Take 1 tablet by mouth 2 (Two) Times a Day for 5 days.  Dispense: 10 tablet; Refill: 0    2. Reactive airways dysfunction syndrome  Assessment & Plan:  Patient describes history of 5 to 6 weeks of a lingering congested cough initially with malaise but has had gradual improvement to the point he has had almost complete resolution of symptoms other than residual mild cough.  Clinical exam reveals evidence of reactive airways with rhonchi and wheezing, and for this we will treat with prednisone as prescribed.  Advise if symptoms not completely resolving.    Orders:  -     predniSONE (DELTASONE) 20 MG tablet; Take 1 tablet by mouth 2 (Two) Times a Day for 5 days.  Dispense: 10 tablet; Refill: 0    3. Benign hypertension  Assessment & Plan:  Satisfactory blood pressure control acutely as well as by history chronically taking amlodipine/losartan 5/100 mg daily.  Continue current regimen with regular monitoring.      4. Benign prostatic hyperplasia with  nocturia  Assessment & Plan:  Improved symptomatic control since addition of Flomax 0.4 mg daily but still having some objective symptoms.  He is to undergo some sort of urological procedure by his urologist on 3/4/2024, currently data deficient.  He will have urology forward pertinent information to me.        Follow Up:   Return in about 3 months (around 5/9/2024) for Recheck.      At [facility], we believe that sharing information builds trust and better relationships. You are receiving this note because you recently visited [facility]. It is possible you will see health information before a provider has talked with you about it. This kind of information can be easy to misunderstand. To help you fully understand what it means for your health, we urge you to discuss this note with your provider.    Jf Lassiter MD  [facility]

## 2024-02-09 NOTE — ASSESSMENT & PLAN NOTE
Improved symptomatic control since addition of Flomax 0.4 mg daily but still having some objective symptoms.  He is to undergo some sort of urological procedure by his urologist on 3/4/2024, currently data deficient.  He will have urology forward pertinent information to me.

## 2024-03-01 DIAGNOSIS — Z79.891 CHRONICALLY ON OPIATE THERAPY: ICD-10-CM

## 2024-03-01 DIAGNOSIS — M54.42 CHRONIC BILATERAL LOW BACK PAIN WITH BILATERAL SCIATICA: ICD-10-CM

## 2024-03-01 DIAGNOSIS — M54.41 CHRONIC BILATERAL LOW BACK PAIN WITH BILATERAL SCIATICA: ICD-10-CM

## 2024-03-01 DIAGNOSIS — G89.29 CHRONIC BILATERAL LOW BACK PAIN WITH BILATERAL SCIATICA: ICD-10-CM

## 2024-03-01 RX ORDER — HYDROCODONE BITARTRATE AND ACETAMINOPHEN 5; 325 MG/1; MG/1
TABLET ORAL
Qty: 90 TABLET | Refills: 0 | Status: SHIPPED | OUTPATIENT
Start: 2024-03-01

## 2024-03-01 NOTE — TELEPHONE ENCOUNTER
Caller: Leslie Kwon    Relationship: Emergency Contact    Best call back number: [phone]     Requested Prescriptions:   Requested Prescriptions     Pending Prescriptions Disp Refills    HYDROcodone-acetaminophen (Norco) 5-325 MG per tablet 90 tablet 0     Si.5 tablets twice daily as needed for pain      Pharmacy where request should be sent: [facility] #[id number] [address]  AT [facility] [phone] [facility] [phone] FX     Last office visit with prescribing clinician: 2024   Last telemedicine visit with prescribing clinician: Visit date not found   Next office visit with prescribing clinician: 5/10/2024     Does the patient have less than a 3 day supply:  [x] Yes  [] No    Sanaz Paul Rep   24 08:34 EST

## 2024-03-29 DIAGNOSIS — M54.42 CHRONIC BILATERAL LOW BACK PAIN WITH BILATERAL SCIATICA: ICD-10-CM

## 2024-03-29 DIAGNOSIS — G89.29 CHRONIC BILATERAL LOW BACK PAIN WITH BILATERAL SCIATICA: ICD-10-CM

## 2024-03-29 DIAGNOSIS — M54.41 CHRONIC BILATERAL LOW BACK PAIN WITH BILATERAL SCIATICA: ICD-10-CM

## 2024-03-29 DIAGNOSIS — Z79.891 CHRONICALLY ON OPIATE THERAPY: ICD-10-CM

## 2024-03-29 RX ORDER — HYDROCODONE BITARTRATE AND ACETAMINOPHEN 5; 325 MG/1; MG/1
TABLET ORAL
Qty: 90 TABLET | Refills: 0 | Status: SHIPPED | OUTPATIENT
Start: 2024-03-29

## 2024-03-29 NOTE — TELEPHONE ENCOUNTER
Caller: Leslie Kwon    Relationship: Emergency Contact    Best call back number: [phone]     Requested Prescriptions:   Requested Prescriptions     Pending Prescriptions Disp Refills    HYDROcodone-acetaminophen (Norco) 5-325 MG per tablet 90 tablet 0     Si.5 tablets twice daily as needed for pain      Pharmacy where request should be sent: [facility] #[id number] [address]  AT [facility] [phone] [facility] [phone] FX     Last office visit with prescribing clinician: 2024   Last telemedicine visit with prescribing clinician: Visit date not found   Next office visit with prescribing clinician: 5/10/2024     Does the patient have less than a 3 day supply:  [x] Yes  [] No    Sanaz Paul Rep   24 10:13 EDT

## 2024-04-30 DIAGNOSIS — M54.41 CHRONIC BILATERAL LOW BACK PAIN WITH BILATERAL SCIATICA: ICD-10-CM

## 2024-04-30 DIAGNOSIS — M54.42 CHRONIC BILATERAL LOW BACK PAIN WITH BILATERAL SCIATICA: ICD-10-CM

## 2024-04-30 DIAGNOSIS — G89.29 CHRONIC BILATERAL LOW BACK PAIN WITH BILATERAL SCIATICA: ICD-10-CM

## 2024-04-30 DIAGNOSIS — Z79.891 CHRONICALLY ON OPIATE THERAPY: ICD-10-CM

## 2024-04-30 RX ORDER — HYDROCODONE BITARTRATE AND ACETAMINOPHEN 5; 325 MG/1; MG/1
TABLET ORAL
Qty: 90 TABLET | Refills: 0 | Status: SHIPPED | OUTPATIENT
Start: 2024-04-30

## 2024-04-30 NOTE — TELEPHONE ENCOUNTER
Caller: [place]    Relationship: Self    Best call back number: [phone]     Requested Prescriptions:   Requested Prescriptions     Pending Prescriptions Disp Refills    HYDROcodone-acetaminophen (Norco) 5-325 MG per tablet 90 tablet 0     Si.5 tablets twice daily as needed for pain        Pharmacy where request should be sent: [facility] #[id number] [facility] [address] [place] [address]  AT [facility] [phone] [facility] [phone] FX     Last office visit with prescribing clinician: 2024   Last telemedicine visit with prescribing clinician: Visit date not found   Next office visit with prescribing clinician: 2024     Additional details provided by patient: OUT OF MEDICATION     Does the patient have less than a 3 day supply:  [x] Yes  [] No    Would you like a call back once the refill request has been completed: [] Yes [x] No    If the office needs to give you a call back, can they leave a voicemail: [] Yes [x] No    Sanaz Nuñez Rep   24 10:13 EDT

## 2024-05-17 ENCOUNTER — OFFICE VISIT (OUTPATIENT)
Dept: FAMILY MEDICINE CLINIC | Facility: CLINIC | Age: 74
End: 2024-05-17
Payer: MEDICARE

## 2024-05-17 VITALS
WEIGHT: 194.8 LBS | BODY MASS INDEX: 29.52 KG/M2 | OXYGEN SATURATION: 97 % | HEART RATE: 75 BPM | HEIGHT: 68 IN | DIASTOLIC BLOOD PRESSURE: 77 MMHG | SYSTOLIC BLOOD PRESSURE: 114 MMHG | TEMPERATURE: 97.6 F

## 2024-05-17 DIAGNOSIS — R39.15 URINARY URGENCY: ICD-10-CM

## 2024-05-17 DIAGNOSIS — I10 BENIGN HYPERTENSION: ICD-10-CM

## 2024-05-17 DIAGNOSIS — N40.1 BENIGN PROSTATIC HYPERPLASIA WITH NOCTURIA: ICD-10-CM

## 2024-05-17 DIAGNOSIS — M54.42 CHRONIC BILATERAL LOW BACK PAIN WITH LEFT-SIDED SCIATICA: Primary | ICD-10-CM

## 2024-05-17 DIAGNOSIS — G89.29 CHRONIC BILATERAL LOW BACK PAIN WITH LEFT-SIDED SCIATICA: Primary | ICD-10-CM

## 2024-05-17 DIAGNOSIS — R35.1 BENIGN PROSTATIC HYPERPLASIA WITH NOCTURIA: ICD-10-CM

## 2024-05-17 PROCEDURE — 3078F DIAST BP <80 MM HG: CPT | Performed by: INTERNAL MEDICINE

## 2024-05-17 PROCEDURE — 1159F MED LIST DOCD IN RCRD: CPT | Performed by: INTERNAL MEDICINE

## 2024-05-17 PROCEDURE — 99214 OFFICE O/P EST MOD 30 MIN: CPT | Performed by: INTERNAL MEDICINE

## 2024-05-17 PROCEDURE — G2211 COMPLEX E/M VISIT ADD ON: HCPCS | Performed by: INTERNAL MEDICINE

## 2024-05-17 PROCEDURE — 3074F SYST BP LT 130 MM HG: CPT | Performed by: INTERNAL MEDICINE

## 2024-05-17 PROCEDURE — 1160F RVW MEDS BY RX/DR IN RCRD: CPT | Performed by: INTERNAL MEDICINE

## 2024-05-17 RX ORDER — PREDNISONE 20 MG/1
20 TABLET ORAL 2 TIMES DAILY
Qty: 10 TABLET | Refills: 0 | Status: SHIPPED | OUTPATIENT
Start: 2024-05-17 | End: 2024-05-22

## 2024-05-17 RX ORDER — AMITRIPTYLINE HYDROCHLORIDE 25 MG/1
25 TABLET, FILM COATED ORAL NIGHTLY
Qty: 90 TABLET | Refills: 1 | Status: SHIPPED | OUTPATIENT
Start: 2024-05-17

## 2024-05-17 NOTE — ASSESSMENT & PLAN NOTE
Some clinical improvement in his urine flow and nocturia but still having some significant urgency. Is followed regularly by urology given history of nephrolithiasis but not having any related symptoms. Initiating trial of amitriptyline 25 mg initially at 0.5 tablets nightly titrating up to 1 tablet nightly as tolerated, this being prescribed for concomitant suspected neuropathic pain in his left leg which should also benefit his sense of urinary urgency. I did advise patient that if he starts to have symptoms of urinary retention, that he should discontinue the amitriptyline promptly and advise if not resolving at that time.

## 2024-05-17 NOTE — ASSESSMENT & PLAN NOTE
Very satisfactory acute on chronic control of his blood pressure taking amlodipine 5 mg daily and losartan 100 mg daily.  Continue current regimen with monitoring.

## 2024-05-17 NOTE — ASSESSMENT & PLAN NOTE
Chronic lumbago with intermittent left-sided sciatica at his baseline, variable symptoms, though 1 month ago developed a new left mid to distal anterior thigh aching discomfort after lifting a heavy object, suspicious for neuropathic flareup of pain.  In addition to his baseline Norco 7.5 mg twice daily as needed, will plan treatment with prednisone x 5 days, and initiate a trial of amitriptyline 25 mg to take at 0.5 tablets nightly for the first 7 to 10 days then increase to 1 tablet nightly as tolerated, this also being prescribed could potentially benefit his symptom of urinary urgency.  Patient understands that he has a potential risk of side effects including extreme sedation or grogginess, or urinary or bowel retention, and if this were to occur then he should discontinue the medication promptly and advise if symptoms not resolving at that point.  Reassess clinical response at his follow-up visit.

## 2024-05-17 NOTE — PROGRESS NOTES
Answers submitted by the patient for this visit:  Primary Reason for Visit (Submitted on 2024)  What is the primary reason for your visit?: Other  Other (Submitted on 2024)  Please describe your symptoms.: Routine 90 day follow up  Have you had these symptoms before?: No  How long have you been having these symptoms?: 1-4 days  Please list any medications you are currently taking for this condition.: NA  Please describe any probable cause for these symptoms. : NA      Follow Up Office Visit      Date: 2024   Patient Name: Larry Kwon  : 1950   MRN: [id number]     Chief Complaint:    Chief Complaint   Patient presents with    Follow-up       History of Present Illness: Larry Kwon is a 74 y.o. male who is here today for routine scheduled 3-month follow-up visit given chronic opiate therapy in the form of Norco 7.5 mg prescribed twice daily for number 60 tablets taken over the course of each month for chronic low back pain with intermittent left-sided lumbago.  Relates 1 month ago lifting a heavy marble table in his home, and since that time having a persistent aching discomfort on the left mid to distal anterior thigh, but not noted on the proximal thigh, chronically having some mild baseline variable low back pain.  Does note some improvement with the thigh pain taking his Norco.  No current loss of bowel or bladder control, no weakness.  History of hypertension taking amlodipine and losartan with blood pressures averaging 120s over 70s, no chest pains palpitations dizziness dyspnea or edema.  History of BPH with nocturia, taking Flomax 0.4 mg daily feeling he does have some improvement but still has nocturia x 1-2, fair to good able urinary stream with no dysuria hematuria, but he does have a sense of significant sudden urinary urgency for which he must urinate promptly.  Does see urologist for history of recurrent kidney stones, currently doing well from the standpoint..    Subjective       Review of Systems:   Review of Systems    I have reviewed the patients family history, social history, past medical history, past surgical history and have updated it as appropriate.     Medications:     Current Outpatient Medications:     allopurinol (ZYLOPRIM) 100 MG tablet, Take 1 tablet by mouth Daily., Disp: 90 tablet, Rfl: 3    amLODIPine (NORVASC) 5 MG tablet, Take 1 tablet by mouth Daily., Disp: 90 tablet, Rfl: 3    atorvastatin (LIPITOR) 20 MG tablet, Take 1 tablet by mouth Daily., Disp: 90 tablet, Rfl: 3    Cholecalciferol (Vitamin D) 50 MCG (2000 UT) tablet, Take 1 tablet by mouth Daily., Disp: 90 tablet, Rfl: 3    HYDROcodone-acetaminophen (Norco) 5-325 MG per tablet, 1.5 tablets twice daily as needed for pain, Disp: 90 tablet, Rfl: 0    latanoprost (XALATAN) 0.005 % ophthalmic solution, , Disp: , Rfl:     losartan (COZAAR) 100 MG tablet, Take 1 tablet by mouth Daily., Disp: 90 tablet, Rfl: 3    montelukast (SINGULAIR) 10 MG tablet, Take 1 tablet by mouth Every Night., Disp: 90 tablet, Rfl: 3    tamsulosin (FLOMAX) 0.4 MG capsule 24 hr capsule, Take 1 capsule by mouth Daily., Disp: 90 capsule, Rfl: 3    travoprost, BAK free, (TRAVATAN) 0.004 % solution ophthalmic solution, Apply 1 drop to eye(s) as directed by provider Daily., Disp: , Rfl:     amitriptyline (ELAVIL) 25 MG tablet, Take 1 tablet by mouth Every Night., Disp: 90 tablet, Rfl: 1    predniSONE (DELTASONE) 20 MG tablet, Take 1 tablet by mouth 2 (Two) Times a Day for 5 days., Disp: 10 tablet, Rfl: 0    Allergies:   Allergies   Allergen Reactions    Ace Inhibitors Cough    Chlorhexidine Rash     Likely to be chloroprep, not necessarily chlorhexidine       Objective     Physical Exam: Please see above  Vital Signs:   Vitals:    05/17/24 1132   BP: 114/77   BP Location: Left arm   Patient Position: Sitting   Cuff Size: Adult   Pulse: 75   Temp: 97.6 °F (36.4 °C)   TempSrc: Temporal   SpO2: 97%   Weight: 88.4 kg (194 lb 12.8 oz)   Height: 172.7 cm  "(68\")     Body mass index is 29.62 kg/m².          Physical Exam  Constitutional:       General: He is not in acute distress.     Appearance: Normal appearance. He is not ill-appearing.      Comments: Always very pleasant well-groomed well-spoken 74-year-old male, NAD, BMI 29.6.   Cardiovascular:      Rate and Rhythm: Normal rate and regular rhythm.      Heart sounds: Normal heart sounds. No murmur heard.     No friction rub. No gallop.   Pulmonary:      Effort: Pulmonary effort is normal. No respiratory distress.      Breath sounds: Normal breath sounds.   Musculoskeletal:         General: Tenderness present. No swelling, deformity or signs of injury.      Right lower leg: No edema.      Left lower leg: No edema.   Neurological:      Mental Status: He is alert. Mental status is at baseline.      Cranial Nerves: No cranial nerve deficit.      Sensory: No sensory deficit.      Motor: No weakness.      Coordination: Coordination normal.      Gait: Gait normal.      Deep Tendon Reflexes: Reflexes abnormal.      Comments: 2+ right knee reflex, difficult to elicit left knee reflex at patient's baseline, 1-2+ bilateral ankle reflexes also at baseline, normal sensation and strength in his lower extremities   Psychiatric:         Mood and Affect: Mood normal.         Behavior: Behavior normal.         Thought Content: Thought content normal.         Judgment: Judgment normal.         Procedures    Results:   Labs:   Hemoglobin A1C   Date Value Ref Range Status   08/11/2023 5.5 4.8 - 5.6 % Final     Comment:              Prediabetes: 5.7 - 6.4           Diabetes: >6.4           Glycemic control for adults with diabetes: <7.0     05/19/2023 5.6 % Final   11/04/2021 5.4 4.3 - 5.6 % Final     Comment:     (note)  A1C% Reference Range:  4.3 - 5.6  Normal range  5.7 - 6.4  Pre-diabetic -increased risk for developing diabetes  mellitus.  >=6.5      Diabetic -diagnostic of diabetes mellitus.    Note: For diagnosis of diabetes in " individuals without unequivocal  hyperglycemia, results should be confirmed by repeat testing.  Patients with conditions that shorten erythrocyte survival, such as  recovery from acute blood loss, hemolytic anemia, kidney disease,  or the presence of unstable hemogloblins like HbSS, HbCC, and HbSC  may yield falsely decreased HbA1c test results. Iron deficiency may  yield falsely increased HbA1c test results.     TSH   Date Value Ref Range Status   08/11/2023 2.010 0.450 - 4.500 uIU/mL Final   10/30/2020 1.460 0.470 - 4.680 u(iU)/mL Final     Comment:     (note)  Higher dose biotin supplements may interfere with this test.  Interpret results within the context of patient's clinical picture.        POCT Results (if applicable):   Results for orders placed or performed in visit on 08/11/23   Calcium, Ionized    Specimen: Arm, Right; Blood    Blood  Release to bruno   Result Value Ref Range    Ionized Calcium 5.2 4.5 - 5.6 mg/dL   Uric Acid    Specimen: Arm, Right; Blood    Blood  Release to bruno   Result Value Ref Range    Uric Acid 4.3 3.8 - 8.4 mg/dL   Vitamin D,25-Hydroxy    Specimen: Arm, Right; Blood    Blood  Release to bruno   Result Value Ref Range    25 Hydroxy, Vitamin D 37.2 30.0 - 100.0 ng/mL   Phosphorus    Specimen: Arm, Right; Blood    Blood  Release to bruno   Result Value Ref Range    Phosphorus 3.4 2.8 - 4.1 mg/dL   PTH, Intact    Specimen: Arm, Right; Blood    Blood  Release to bruno   Result Value Ref Range    PTH, Intact 42 15 - 65 pg/mL   Hemoglobin A1c    Specimen: Arm, Right; Blood    Blood  Release to bruno   Result Value Ref Range    Hemoglobin A1C 5.5 4.8 - 5.6 %   Lipid Panel    Specimen: Arm, Right; Blood    Blood  Release to bruno   Result Value Ref Range    Total Cholesterol 170 100 - 199 mg/dL    Triglycerides 124 0 - 149 mg/dL    HDL Cholesterol 65 >39 mg/dL    VLDL Cholesterol Dhruv 22 5 - 40 mg/dL    LDL Chol Calc (NIH) 83 0 - 99 mg/dL   Comprehensive Metabolic Panel    Specimen: Arm, Right;  Blood    Blood  Release to bruno   Result Value Ref Range    Glucose 88 70 - 99 mg/dL    BUN 12 8 - 27 mg/dL    Creatinine 1.15 0.76 - 1.27 mg/dL    EGFR Result 66 >59 mL/min/1.73    BUN/Creatinine Ratio 10 10 - 24    Sodium 141 134 - 144 mmol/L    Potassium 4.7 3.5 - 5.2 mmol/L    Chloride 102 96 - 106 mmol/L    Total CO2 23 20 - 29 mmol/L    Calcium 9.9 8.6 - 10.2 mg/dL    Total Protein 7.4 6.0 - 8.5 g/dL    Albumin 4.7 3.8 - 4.8 g/dL    Globulin 2.7 1.5 - 4.5 g/dL    A/G Ratio 1.7 1.2 - 2.2    Total Bilirubin 1.0 0.0 - 1.2 mg/dL    Alkaline Phosphatase 125 (H) 44 - 121 IU/L    AST (SGOT) 26 0 - 40 IU/L    ALT (SGPT) 21 0 - 44 IU/L   TSH Rfx On Abnormal To Free T4    Specimen: Arm, Right; Blood    Blood  Release to bruno   Result Value Ref Range    TSH 2.010 0.450 - 4.500 uIU/mL   Urine Drug Screen - Urine, Clean Catch    Specimen: Urine, Clean Catch    Urine  Release to bruno   Result Value Ref Range    Amphetamine, Urine Qual Negative Amyfou=6951 ng/mL    Barbiturates Screen, Urine Negative Shhtyt=518 ng/mL    Benzodiazepine Screen, Urine Negative Jynqez=462 ng/mL    THC Screen, Urine Negative Cutoff=20 ng/mL    Cocaine Screen, Urine Negative Rwsdob=687 ng/mL    Opiate Screen, Urine Positive (A) Zacyag=975 ng/mL    Oxycodone/Oxymorphone, Urine Negative Apuwdt=358 ng/mL    Phencyclidine (PCP), Urine Negative Cutoff=25 ng/mL    Methadone Screen, Urine Negative Gzwhkq=762 ng/mL    Propoxyphene Screen Negative Yalueh=378 ng/mL    Creatinine, Urine 127.6 20.0 - 300.0 mg/dL    pH, UA 5.3 4.5 - 8.9    Please note Comment    MicroAlbumin, Urine, Random - Urine, Clean Catch    Specimen: Urine, Clean Catch    Urine  Release to bruno   Result Value Ref Range    Microalbumin, Urine 21.9 Not Estab. ug/mL   Urinalysis With Culture If Indicated - Urine, Clean Catch    Specimen: Urine, Clean Catch    Urine  Release to bruno   Result Value Ref Range    Specific Gravity, UA 1.015 1.005 - 1.030    pH, UA 6.0 5.0 - 7.5    Color, UA  Yellow Yellow    Appearance, UA Clear Clear    Leukocytes, UA Negative Negative    Protein Negative Negative/Trace    Glucose, UA Negative Negative    Ketones Negative Negative    Blood, UA Negative Negative    Bilirubin, UA Negative Negative    Urobilinogen, UA 0.2 0.2 - 1.0 mg/dL    Nitrite, UA Negative Negative    Microscopic Examination Comment     Microscopic Examination See below:     Urinalysis Reflex Comment    Microscopic Examination -    Urine  Release to bruno   Result Value Ref Range    WBC, UA None seen 0 - 5 /hpf    RBC, UA None seen 0 - 2 /hpf    Epithelial Cells (non renal) None seen 0 - 10 /hpf    Casts None seen None seen /lpf    Bacteria, UA None seen None seen/Few       Assessment / Plan      Assessment/Plan:   Diagnoses and all orders for this visit:    1. Chronic bilateral low back pain with left-sided sciatica (Primary)  Assessment & Plan:  Chronic lumbago with intermittent left-sided sciatica at his baseline, variable symptoms, though 1 month ago developed a new left mid to distal anterior thigh aching discomfort after lifting a heavy object, suspicious for neuropathic flareup of pain.  In addition to his baseline Norco 7.5 mg twice daily as needed, will plan treatment with prednisone x 5 days, and initiate a trial of amitriptyline 25 mg to take at 0.5 tablets nightly for the first 7 to 10 days then increase to 1 tablet nightly as tolerated, this also being prescribed could potentially benefit his symptom of urinary urgency.  Patient understands that he has a potential risk of side effects including extreme sedation or grogginess, or urinary or bowel retention, and if this were to occur then he should discontinue the medication promptly and advise if symptoms not resolving at that point.  Reassess clinical response at his follow-up visit.    Orders:  -     predniSONE (DELTASONE) 20 MG tablet; Take 1 tablet by mouth 2 (Two) Times a Day for 5 days.  Dispense: 10 tablet; Refill: 0  -      amitriptyline (ELAVIL) 25 MG tablet; Take 1 tablet by mouth Every Night.  Dispense: 90 tablet; Refill: 1    2. Benign hypertension  Assessment & Plan:  Very satisfactory acute on chronic control of his blood pressure taking amlodipine 5 mg daily and losartan 100 mg daily.  Continue current regimen with monitoring.      3. Benign prostatic hyperplasia with nocturia  Assessment & Plan:  Some clinical improvement in his urine flow and nocturia but still having some significant urgency.  Is followed regularly by urology given history of nephrolithiasis but not having any related symptoms.  Initiating trial of amitriptyline 25 mg initially at 0.5 tablets nightly titrating up to 1 tablet nightly as tolerated, this being prescribed for concomitant suspected neuropathic pain in his left leg which should also benefit his sense of urinary urgency.  I did advise patient that if he starts to have symptoms of urinary retention, that he should discontinue the amitriptyline promptly and advise if not resolving at that time.      4. Urinary urgency  Assessment & Plan:  Some clinical improvement in his urine flow and nocturia but still having some significant urgency. Is followed regularly by urology given history of nephrolithiasis but not having any related symptoms. Initiating trial of amitriptyline 25 mg initially at 0.5 tablets nightly titrating up to 1 tablet nightly as tolerated, this being prescribed for concomitant suspected neuropathic pain in his left leg which should also benefit his sense of urinary urgency. I did advise patient that if he starts to have symptoms of urinary retention, that he should discontinue the amitriptyline promptly and advise if not resolving at that time.     Orders:  -     amitriptyline (ELAVIL) 25 MG tablet; Take 1 tablet by mouth Every Night.  Dispense: 90 tablet; Refill: 1        Follow Up:   Return in about 3 months (around 8/17/2024) for Medicare Subsq..      At [facility], we believe  that sharing information builds trust and better relationships. You are receiving this note because you recently visited [facility]. It is possible you will see health information before a provider has talked with you about it. This kind of information can be easy to misunderstand. To help you fully understand what it means for your health, we urge you to discuss this note with your provider.    Jf Lassiter MD  [facility]

## 2024-05-20 RX ORDER — LOSARTAN POTASSIUM 100 MG/1
100 TABLET ORAL DAILY
Qty: 90 TABLET | Refills: 3 | Status: SHIPPED | OUTPATIENT
Start: 2024-05-20

## 2024-05-20 RX ORDER — AMLODIPINE BESYLATE 5 MG/1
5 TABLET ORAL DAILY
Qty: 90 TABLET | Refills: 3 | Status: SHIPPED | OUTPATIENT
Start: 2024-05-20

## 2024-05-28 DIAGNOSIS — M54.42 CHRONIC BILATERAL LOW BACK PAIN WITH BILATERAL SCIATICA: ICD-10-CM

## 2024-05-28 DIAGNOSIS — M54.41 CHRONIC BILATERAL LOW BACK PAIN WITH BILATERAL SCIATICA: ICD-10-CM

## 2024-05-28 DIAGNOSIS — Z79.891 CHRONICALLY ON OPIATE THERAPY: ICD-10-CM

## 2024-05-28 DIAGNOSIS — G89.29 CHRONIC BILATERAL LOW BACK PAIN WITH BILATERAL SCIATICA: ICD-10-CM

## 2024-05-28 RX ORDER — HYDROCODONE BITARTRATE AND ACETAMINOPHEN 5; 325 MG/1; MG/1
TABLET ORAL
Qty: 90 TABLET | Refills: 0 | Status: SHIPPED | OUTPATIENT
Start: 2024-05-28

## 2024-05-28 NOTE — TELEPHONE ENCOUNTER
Caller: Leslie Kwon    Relationship: Emergency Contact    Best call back number: [phone]    Requested Prescriptions:   Requested Prescriptions     Pending Prescriptions Disp Refills    HYDROcodone-acetaminophen (Norco) 5-325 MG per tablet 90 tablet 0     Si.5 tablets twice daily as needed for pain        Pharmacy where request should be sent: [id number] [address] [place] [address]  AT [facility] [phone] [facility] [phone] FX     Last office visit with prescribing clinician: 2024   Last telemedicine visit with prescribing clinician: Visit date not found   Next office visit with prescribing clinician: 8/15/2024     Additional details provided by patient:     Does the patient have less than a 3 day supply:  [x] Yes  [] No    Sanaz Saleem Rep   24 14:24 EDT

## 2024-06-07 RX ORDER — ATORVASTATIN CALCIUM 20 MG/1
20 TABLET, FILM COATED ORAL DAILY
Qty: 90 TABLET | Refills: 3 | Status: SHIPPED | OUTPATIENT
Start: 2024-06-07

## 2024-06-07 RX ORDER — MONTELUKAST SODIUM 10 MG/1
10 TABLET ORAL NIGHTLY
Qty: 90 TABLET | Refills: 3 | Status: SHIPPED | OUTPATIENT
Start: 2024-06-07

## 2024-06-07 RX ORDER — ALLOPURINOL 100 MG/1
100 TABLET ORAL DAILY
Qty: 90 TABLET | Refills: 3 | Status: SHIPPED | OUTPATIENT
Start: 2024-06-07

## 2024-06-27 DIAGNOSIS — M54.41 CHRONIC BILATERAL LOW BACK PAIN WITH BILATERAL SCIATICA: ICD-10-CM

## 2024-06-27 DIAGNOSIS — Z79.891 CHRONICALLY ON OPIATE THERAPY: ICD-10-CM

## 2024-06-27 DIAGNOSIS — M54.42 CHRONIC BILATERAL LOW BACK PAIN WITH BILATERAL SCIATICA: ICD-10-CM

## 2024-06-27 DIAGNOSIS — G89.29 CHRONIC BILATERAL LOW BACK PAIN WITH BILATERAL SCIATICA: ICD-10-CM

## 2024-06-27 RX ORDER — HYDROCODONE BITARTRATE AND ACETAMINOPHEN 5; 325 MG/1; MG/1
TABLET ORAL
Qty: 90 TABLET | Refills: 0 | Status: SHIPPED | OUTPATIENT
Start: 2024-06-27

## 2024-06-27 NOTE — TELEPHONE ENCOUNTER
Caller: Leslie Kwon    Relationship: Emergency Contact    Best call back number: [phone]    Requested Prescriptions:   Requested Prescriptions     Pending Prescriptions Disp Refills    HYDROcodone-acetaminophen (Norco) 5-325 MG per tablet 90 tablet 0     Si.5 tablets twice daily as needed for pain        Pharmacy where request should be sent: [id number] [address] [place]  AT [facility] [phone] [facility] [phone] FX     Last office visit with prescribing clinician: 2024   Last telemedicine visit with prescribing clinician: Visit date not found   Next office visit with prescribing clinician: 8/15/2024         Does the patient have less than a 3 day supply:  [x] Yes  [] No    Would you like a call back once the refill request has been completed: [] Yes [] No    If the office needs to give you a call back, can they leave a voicemail: [] Yes [x] No    Sanaz Montemayor Rep   24 14:53 EDT

## 2024-07-05 ENCOUNTER — ANESTHESIA (OUTPATIENT)
Dept: GASTROENTEROLOGY | Facility: HOSPITAL | Age: 74
End: 2024-07-05
Payer: MEDICARE

## 2024-07-05 ENCOUNTER — ON CAMPUS - OUTPATIENT (OUTPATIENT)
Dept: URBAN - METROPOLITAN AREA HOSPITAL 114 | Facility: HOSPITAL | Age: 74
End: 2024-07-05

## 2024-07-05 ENCOUNTER — HOSPITAL ENCOUNTER (OUTPATIENT)
Facility: HOSPITAL | Age: 74
Setting detail: HOSPITAL OUTPATIENT SURGERY
Discharge: HOME | End: 2024-07-05
Attending: INTERNAL MEDICINE | Admitting: INTERNAL MEDICINE
Payer: MEDICARE

## 2024-07-05 ENCOUNTER — ANESTHESIA EVENT (OUTPATIENT)
Dept: GASTROENTEROLOGY | Facility: HOSPITAL | Age: 74
End: 2024-07-05
Payer: MEDICARE

## 2024-07-05 VITALS
HEIGHT: 68 IN | OXYGEN SATURATION: 96 % | SYSTOLIC BLOOD PRESSURE: 116 MMHG | HEART RATE: 79 BPM | BODY MASS INDEX: 27.93 KG/M2 | WEIGHT: 184.3 LBS | RESPIRATION RATE: 16 BRPM | DIASTOLIC BLOOD PRESSURE: 76 MMHG

## 2024-07-05 DIAGNOSIS — Z86.010 HISTORY OF COLON POLYPS: ICD-10-CM

## 2024-07-05 DIAGNOSIS — K62.1 RECTAL POLYP: ICD-10-CM

## 2024-07-05 DIAGNOSIS — D12.0 BENIGN NEOPLASM OF CECUM: ICD-10-CM

## 2024-07-05 DIAGNOSIS — Z86.010 PERSONAL HISTORY OF COLONIC POLYPS: ICD-10-CM

## 2024-07-05 DIAGNOSIS — D12.3 BENIGN NEOPLASM OF TRANSVERSE COLON: ICD-10-CM

## 2024-07-05 DIAGNOSIS — K64.1 SECOND DEGREE HEMORRHOIDS: ICD-10-CM

## 2024-07-05 DIAGNOSIS — Z09 ENCOUNTER FOR FOLLOW-UP EXAMINATION AFTER COMPLETED TREATMEN: ICD-10-CM

## 2024-07-05 PROCEDURE — 88305 TISSUE EXAM BY PATHOLOGIST: CPT | Performed by: INTERNAL MEDICINE

## 2024-07-05 PROCEDURE — 45380 COLONOSCOPY AND BIOPSY: CPT | Mod: PT | Performed by: INTERNAL MEDICINE

## 2024-07-05 PROCEDURE — 25810000003 LACTATED RINGERS PER 1000 ML: Performed by: INTERNAL MEDICINE

## 2024-07-05 PROCEDURE — 25010000002 PROPOFOL 1000 MG/100ML EMULSION

## 2024-07-05 RX ORDER — PROPOFOL 10 MG/ML
INJECTION, EMULSION INTRAVENOUS AS NEEDED
Status: DISCONTINUED | OUTPATIENT
Start: 2024-07-05 | End: 2024-07-05 | Stop reason: SURG

## 2024-07-05 RX ORDER — LIDOCAINE HYDROCHLORIDE 20 MG/ML
INJECTION, SOLUTION INFILTRATION; PERINEURAL AS NEEDED
Status: DISCONTINUED | OUTPATIENT
Start: 2024-07-05 | End: 2024-07-05 | Stop reason: SURG

## 2024-07-05 RX ORDER — SODIUM CHLORIDE, SODIUM LACTATE, POTASSIUM CHLORIDE, CALCIUM CHLORIDE 600; 310; 30; 20 MG/100ML; MG/100ML; MG/100ML; MG/100ML
1000 INJECTION, SOLUTION INTRAVENOUS CONTINUOUS
Status: DISCONTINUED | OUTPATIENT
Start: 2024-07-05 | End: 2024-07-05 | Stop reason: HOSPADM

## 2024-07-05 RX ADMIN — SODIUM CHLORIDE, POTASSIUM CHLORIDE, SODIUM LACTATE AND CALCIUM CHLORIDE 1000 ML: 600; 310; 30; 20 INJECTION, SOLUTION INTRAVENOUS at 09:27

## 2024-07-05 RX ADMIN — PROPOFOL INJECTABLE EMULSION 150 MCG/KG/MIN: 10 INJECTION, EMULSION INTRAVENOUS at 10:05

## 2024-07-05 RX ADMIN — LIDOCAINE HYDROCHLORIDE 80 MG: 20 INJECTION, SOLUTION INFILTRATION; PERINEURAL at 10:04

## 2024-07-05 RX ADMIN — PROPOFOL INJECTABLE EMULSION 70 MG: 10 INJECTION, EMULSION INTRAVENOUS at 10:04

## 2024-07-05 NOTE — ANESTHESIA POSTPROCEDURE EVALUATION
Patient: Larry Kwon    Procedure Summary       Date: 07/05/24 Room / Location:  [facility] /  [facility]    Anesthesia Start: 1001 Anesthesia Stop: 1029    Procedure: COLONOSCOPY TO CECUM AND TI WITH COLD BIOPSY POLPYECTOMIES Diagnosis:     Surgeons: Pavan Sauceda MD Provider: Maximino Fisher MD    Anesthesia Type: MAC ASA Status: 2            Anesthesia Type: MAC    Vitals  Vitals Value Taken Time   BP 95/64 07/05/24 1028   Temp     Pulse 84 07/05/24 1036   Resp 18 07/05/24 1027   SpO2 94 % 07/05/24 1036   Vitals shown include unfiled device data.        Post Anesthesia Care and Evaluation    Patient location during evaluation: bedside  Patient participation: complete - patient participated  Level of consciousness: awake and alert  Pain management: adequate    Airway patency: patent  Anesthetic complications: No anesthetic complications  PONV Status: controlled  Cardiovascular status: blood pressure returned to baseline and acceptable  Respiratory status: acceptable  Hydration status: acceptable

## 2024-07-05 NOTE — H&P
[facility]   HISTORY AND PHYSICAL    Patient Name: Larry Kwon  : 1950  MRN: [id number]  Primary Care Physician:  Jf Lassiter MD  Date of admission: 2024    Subjective   Subjective     Chief Complaint: history of colon polyps    History of Present Illness  The patient presents with no gastrointestinal  Symptoms or issues at this time   Review of Systems   All other systems reviewed and are negative.       Personal History     Past Medical History:   Diagnosis Date    Allergic rhinitis     Arthritis     Arthropathy of cervical spine     BPH (benign prostatic hyperplasia)     Callus of toe     LEFT GREAT TOE    Colon polyp at age 48    reviewed every 3 yrs    Diverticulosis of large intestine without perforation or abscess without bleeding     Dyslipidemia     Dysthymia     Elevated cholesterol     Glaucoma     BILATERAL    Gout     Hypertension     Irritability     Lumbago     INTERMITTENT    Meralgia paresthetica, left lower limb     Ocular hypertension     BORDERLINE    Pain in right knee     PONV (postoperative nausea and vomiting) ?    One incident years ago    Primary hyperparathyroidism 2023    Radiculopathy of leg     LEFT    Radiculopathy, cervical     LEFT SIDED    RBBB     Right nephrolithiasis     Urinary calculi     Vitamin D deficiency     Dr. Lassiter prescribes.       Past Surgical History:   Procedure Laterality Date    APPENDECTOMY      5 YEARS OLD    BACK SURGERY      x3    CERVICAL FUSION N/A 2013    COLONOSCOPY N/A 2013    Normal ileum, two 3-5 mm polyps in the ascending colon-Dr. Pavan Sauceda    COLONOSCOPY N/A 2021    Procedure: COLONOSCOPY TO CECUM AND TERM ILEUM WITH SALINE LIFT AND HOT SNARE POLYPECTOMIES;  Surgeon: Pavan Sauceda MD;  Location: [facility];  Service: Gastroenterology;  Laterality: N/A;  PRE OP - PERS H/O POLYPS  POST OP - COLON POLYPS    EXTRACORPOREAL SHOCK WAVE LITHOTRIPSY (ESWL)      multiple    HERNIA REPAIR       INGUINAL HERNIA REPAIR Bilateral 2000    LUMBAR DISCECTOMY      X2 2005,2002    PARATHYROIDECTOMY N/A 6/7/2023    Procedure: left inferior parathyroidectomy with bilateral exploration;  Surgeon: Cecilia Gil MD;  Location: [facility] OR;  Service: General;  Laterality: N/A;    PROSTATE BIOPSY  09/17/2019    PROSTATE SURGERY  12/23/2015    TUNA PROCEDURE    URETERAL STENT INSERTION Right 03/2014    WITH BASKET STONE EXTRACTION       Family History: family history includes Cancer in his mother; Diabetes in his maternal uncle; Hypertension in his maternal uncle and maternal uncle; Obesity in his maternal uncle; Pancreatic cancer in his mother; Suicidality in his brother. Otherwise pertinent FHx was reviewed and not pertinent to current issue.    Social History:  reports that he has never smoked. He has never used smokeless tobacco. He reports current alcohol use of about 2.0 - 3.0 standard drinks of alcohol per week. He reports that he does not use drugs.    Home Medications:  HYDROcodone-acetaminophen, Vitamin D, allopurinol, amLODIPine, amitriptyline, atorvastatin, latanoprost, losartan, montelukast, tamsulosin, and travoprost (ANA free)    Allergies:  Allergies   Allergen Reactions    Ace Inhibitors Cough    Chlorhexidine Rash     Likely to be chloroprep, not necessarily chlorhexidine       Objective    Objective     Vitals:   Heart Rate:  [82] 82  Resp:  [18] 18  BP: (111)/(69) 111/69    Physical Exam  HENT:      Right Ear: External ear normal.      Left Ear: External ear normal.      Mouth/Throat:      Pharynx: Oropharynx is clear.   Eyes:      Conjunctiva/sclera: Conjunctivae normal.   Cardiovascular:      Rate and Rhythm: Normal rate.   Pulmonary:      Effort: Pulmonary effort is normal.   Abdominal:      General: Abdomen is flat.   Skin:     General: Skin is warm.   Neurological:      General: No focal deficit present.      Mental Status: He is alert.   Psychiatric:         Mood and Affect: Mood normal.          Result Review    Result Review:  I have personally reviewed the results from the time of this admission to 7/5/2024 10:04 EDT and agree with these findings:  []  Laboratory list / accordion  []  Microbiology  []  Radiology  []  EKG/Telemetry   []  Cardiology/Vascular   []  Pathology  []  Old records  []  Other:  Most notable findings include:       Assessment & Plan   Assessment / Plan     Brief Patient Summary:  Larry Kwon is a 74 y.o. male who   Personal history of colon polyps     Active Hospital Problems:  There are no active hospital problems to display for this patient.    Plan: Colonoscopy risks, alternatives and benefits discussed with patient and the patient is agreeable to having procedure done.      VTE Prophylaxis:  No VTE prophylaxis order currently exists.        CODE STATUS:       Admission Status:  I believe this patient meets outpatient  status.    Pavan Sauceda MD

## 2024-07-05 NOTE — DISCHARGE INSTRUCTIONS
For the next 24 hours patient needs to be with a responsible adult.    For 24 hours DO NOT drive, operate machinery, appliances, drink alcohol, make important decisions or sign legal documents.    Start with a light or bland diet if you are feeling sick to your stomach otherwise advance to regular diet as tolerated.    Follow recommendations on procedure report if provided by your doctor.    Call Dr Sauceda for problems [phone]    Problems may include but not limited to: large amounts of bleeding, trouble breathing, repeated vomiting, severe unrelieved pain, fever or chills.

## 2024-07-05 NOTE — ANESTHESIA PREPROCEDURE EVALUATION
Anesthesia Evaluation     Patient summary reviewed and Nursing notes reviewed   history of anesthetic complications:  PONV  NPO Solid Status: > 8 hours  NPO Liquid Status: > 2 hours           Airway   Mallampati: II  TM distance: >3 FB  Neck ROM: full  Dental      Pulmonary    Cardiovascular     (+) hypertension, hyperlipidemia      Neuro/Psych  GI/Hepatic/Renal/Endo      Musculoskeletal     Abdominal    Substance History      OB/GYN          Other                    Anesthesia Plan    ASA 2     MAC     intravenous induction     Anesthetic plan, risks, benefits, and alternatives have been provided, discussed and informed consent has been obtained with: patient.    CODE STATUS:

## 2024-07-08 LAB
LAB AP CASE REPORT: NORMAL
PATH REPORT.FINAL DX SPEC: NORMAL
PATH REPORT.GROSS SPEC: NORMAL

## 2024-07-25 DIAGNOSIS — Z79.891 CHRONICALLY ON OPIATE THERAPY: ICD-10-CM

## 2024-07-25 DIAGNOSIS — G89.29 CHRONIC BILATERAL LOW BACK PAIN WITH BILATERAL SCIATICA: ICD-10-CM

## 2024-07-25 DIAGNOSIS — M54.41 CHRONIC BILATERAL LOW BACK PAIN WITH BILATERAL SCIATICA: ICD-10-CM

## 2024-07-25 DIAGNOSIS — M54.42 CHRONIC BILATERAL LOW BACK PAIN WITH BILATERAL SCIATICA: ICD-10-CM

## 2024-07-25 RX ORDER — HYDROCODONE BITARTRATE AND ACETAMINOPHEN 5; 325 MG/1; MG/1
TABLET ORAL
Qty: 90 TABLET | Refills: 0 | Status: SHIPPED | OUTPATIENT
Start: 2024-07-25

## 2024-07-25 NOTE — TELEPHONE ENCOUNTER
Caller: Leslie Kwon    Relationship: Emergency Contact    Best call back number: [phone]     Requested Prescriptions:   Requested Prescriptions     Pending Prescriptions Disp Refills    HYDROcodone-acetaminophen (Norco) 5-325 MG per tablet 90 tablet 0     Si.5 tablets twice daily as needed for pain        Pharmacy where request should be sent: [id number] [address]  AT [facility] [phone] [facility] [phone] FX     Last office visit with prescribing clinician: 2024   Last telemedicine visit with prescribing clinician: Visit date not found   Next office visit with prescribing clinician: 8/15/2024     Additional details provided by patient: WILL RUN OUT OVER THE WEEKEND     Does the patient have less than a 3 day supply:  [x] Yes  [] No    Would you like a call back once the refill request has been completed: [] Yes [x] No    If the office needs to give you a call back, can they leave a voicemail: [] Yes [x] No    Sanaz Nuñez   24 11:56 EDT

## 2024-08-06 DIAGNOSIS — N40.1 BPH ASSOCIATED WITH NOCTURIA: ICD-10-CM

## 2024-08-06 DIAGNOSIS — R35.1 BPH ASSOCIATED WITH NOCTURIA: ICD-10-CM

## 2024-08-06 RX ORDER — TAMSULOSIN HYDROCHLORIDE 0.4 MG/1
1 CAPSULE ORAL DAILY
Qty: 90 CAPSULE | Refills: 3 | Status: SHIPPED | OUTPATIENT
Start: 2024-08-06

## 2024-08-14 DIAGNOSIS — G89.29 CHRONIC BILATERAL LOW BACK PAIN WITH LEFT-SIDED SCIATICA: ICD-10-CM

## 2024-08-14 DIAGNOSIS — M54.42 CHRONIC BILATERAL LOW BACK PAIN WITH LEFT-SIDED SCIATICA: ICD-10-CM

## 2024-08-14 DIAGNOSIS — R39.15 URINARY URGENCY: ICD-10-CM

## 2024-08-14 RX ORDER — AMITRIPTYLINE HYDROCHLORIDE 25 MG/1
25 TABLET, FILM COATED ORAL NIGHTLY
Qty: 90 TABLET | Refills: 2 | Status: SHIPPED | OUTPATIENT
Start: 2024-08-14

## 2024-08-15 ENCOUNTER — OFFICE VISIT (OUTPATIENT)
Dept: FAMILY MEDICINE CLINIC | Facility: CLINIC | Age: 74
End: 2024-08-15
Payer: MEDICARE

## 2024-08-15 VITALS
OXYGEN SATURATION: 97 % | SYSTOLIC BLOOD PRESSURE: 108 MMHG | DIASTOLIC BLOOD PRESSURE: 68 MMHG | TEMPERATURE: 99 F | BODY MASS INDEX: 28.61 KG/M2 | HEIGHT: 68 IN | HEART RATE: 104 BPM | WEIGHT: 188.8 LBS

## 2024-08-15 DIAGNOSIS — H40.89 BILATERAL GLAUCOMA DUE TO COMBINATION OF MECHANISMS: ICD-10-CM

## 2024-08-15 DIAGNOSIS — E78.2 MIXED HYPERLIPIDEMIA: ICD-10-CM

## 2024-08-15 DIAGNOSIS — Z00.01 ENCOUNTER FOR GENERAL ADULT MEDICAL EXAMINATION WITH ABNORMAL FINDINGS: ICD-10-CM

## 2024-08-15 DIAGNOSIS — Z13.29 SCREENING FOR THYROID DISORDER: ICD-10-CM

## 2024-08-15 DIAGNOSIS — Z79.891 CHRONIC PRESCRIPTION OPIATE USE: ICD-10-CM

## 2024-08-15 DIAGNOSIS — I10 BENIGN HYPERTENSION: ICD-10-CM

## 2024-08-15 DIAGNOSIS — E66.3 OVERWEIGHT (BMI 25.0-29.9): ICD-10-CM

## 2024-08-15 DIAGNOSIS — I95.1 ORTHOSTASIS: ICD-10-CM

## 2024-08-15 DIAGNOSIS — E55.9 VITAMIN D DEFICIENCY: ICD-10-CM

## 2024-08-15 DIAGNOSIS — G89.29 CHRONIC BILATERAL LOW BACK PAIN WITHOUT SCIATICA: ICD-10-CM

## 2024-08-15 DIAGNOSIS — I45.10 RBBB: ICD-10-CM

## 2024-08-15 DIAGNOSIS — Z87.442 HISTORY OF KIDNEY STONES: ICD-10-CM

## 2024-08-15 DIAGNOSIS — N40.1 BPH ASSOCIATED WITH NOCTURIA: ICD-10-CM

## 2024-08-15 DIAGNOSIS — Z86.39 HISTORY OF PRIMARY HYPERPARATHYROIDISM: ICD-10-CM

## 2024-08-15 DIAGNOSIS — R35.1 BPH ASSOCIATED WITH NOCTURIA: ICD-10-CM

## 2024-08-15 DIAGNOSIS — M79.652 ACUTE PAIN OF LEFT THIGH: ICD-10-CM

## 2024-08-15 DIAGNOSIS — R73.03 PREDIABETES: ICD-10-CM

## 2024-08-15 DIAGNOSIS — Z00.00 MEDICARE ANNUAL WELLNESS VISIT, SUBSEQUENT: Primary | ICD-10-CM

## 2024-08-15 DIAGNOSIS — Z87.39 HISTORY OF GOUT: ICD-10-CM

## 2024-08-15 DIAGNOSIS — M54.50 CHRONIC BILATERAL LOW BACK PAIN WITHOUT SCIATICA: ICD-10-CM

## 2024-08-15 PROBLEM — H40.9 GLAUCOMA: Status: RESOLVED | Noted: 2023-05-18 | Resolved: 2024-08-15

## 2024-08-15 NOTE — ASSESSMENT & PLAN NOTE
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  Repeat calcium level.  He does follow-up intermittently with endocrinology.

## 2024-08-15 NOTE — ASSESSMENT & PLAN NOTE
Followed regularly by ophthalmology, reportedly having had normal ocular pressures, taking Xalatan and Travatan drops

## 2024-08-15 NOTE — ASSESSMENT & PLAN NOTE
History longstanding back pain with intermittent bilateral sciatica, now without sciatica and having significant improvement in the back pain.  He takes Norco 7.5 mg prescribed twice daily for a total of 60 tablets daily, but typically takes the medication more regularly for a total of 60 tablets monthly, this improving his quality life with no significant side effects of medication and no suspicion on my part of inappropriate use of medication.  Update controlled substance agreement and urine drug screen today.

## 2024-08-15 NOTE — ASSESSMENT & PLAN NOTE
BMI mildly elevated but generally stable at 28.  Continue healthy lifestyle efforts with diet and exercise.

## 2024-08-15 NOTE — ASSESSMENT & PLAN NOTE
Describes onset about 6 weeks ago of left mid to distal thigh pain in the area of the quadriceps.  Suspect muscular strain.  Has significantly improved with rest.  Gradually increase physical activity as tolerated.

## 2024-08-15 NOTE — ASSESSMENT & PLAN NOTE
Overall satisfactory control taking Flomax 0.4 mg daily, having had the addition of amitriptyline 25 mg nightly with improvement in his nocturia now 3 months ago.  Monitor for any anticholinergic side effects.

## 2024-08-15 NOTE — ASSESSMENT & PLAN NOTE
74-year male presenting for his yearly complete physical and Medicare wellness visit.  Health issues being addressed as detailed below, health maintenance includes colonoscopy current from 7/5/2024 with 3 polyps noted, gastroenterologist recommending 3-year follow-up, recommended update RSV Shingrix COVID-19 and seasonal appropriate flu vaccine, EKG stable with RBBB, obtain routine labs, including urine drug screen and controlled substance agreement given his chronic opiate therapy for back pain.

## 2024-08-15 NOTE — ASSESSMENT & PLAN NOTE
Single episode of positional orthostasis noted about a week ago, blood pressures checked since generally running the 110s to 120s over 70s to 80s though he did have a couple systolic pressures in the 100s.  For now we will continue his amlodipine 5 mg daily and losartan 100 mg daily, monitor blood pressures a couple times daily for the next several weeks.  If he notes significant recurrent positional lightheadedness, or if systolic pressures are consistently in the 100s or lowers, he is to reduce the amlodipine 5 mg down to 0.5 tablets daily and if not noting increasing pressures at that time, then to stop the amlodipine altogether.  Advise accordingly.

## 2024-08-15 NOTE — ASSESSMENT & PLAN NOTE
On chronic Norco therapy as noted above given her chronic back pain.  Update urine drug screen and controlled substance agreement.

## 2024-08-15 NOTE — ASSESSMENT & PLAN NOTE
EKG today stable with RBBB at baseline.  Very satisfactory blood pressure control currently, generally satisfactory control chronically other than a couple episodes of low normal systolic pressures in the 100s, currently taking amlodipine 5 mg daily and losartan 100 mg daily.  Patient did have a single episode of orthostasis with dizziness positionally noted about a week ago, with none further noted.  Advised to monitor blood pressures closely on current regimen, and if notes systolic pressures consistently running in the 100s or lower was, then to reduce his amlodipine 5 mg to 0.5 tablets daily to see if this raises pred pressures into the 110s, and if not then to discontinue entirely.  Advise accordingly.

## 2024-08-15 NOTE — ASSESSMENT & PLAN NOTE
Most recent hemoglobin A1c normalized to 5.5% in 8/2023.  Repeat testing today.  Encouraged healthy lifestyle with diet and exercise

## 2024-08-15 NOTE — PROGRESS NOTES
Venipuncture Blood Specimen Collection  Venipuncture performed in right arm by Laura Pollard MA with good hemostasis. Patient tolerated the procedure well without complications.   08/15/24   Laura Pollard MA

## 2024-08-15 NOTE — PROGRESS NOTES
Subjective   The ABCs of the Annual Wellness Visit  Medicare Wellness Visit      Larry Kwon is a 74 y.o. patient who presents for a Medicare Wellness Visit.    The following portions of the patient's history were reviewed and   updated as appropriate: allergies, current medications, past family history, past medical history, past social history, past surgical history, and problem list.    Compared to one year ago, the patient's physical   health is better.  Compared to one year ago, the patient's mental   health is better.    Recent Hospitalizations:  He was not admitted to the hospital during the last year.     Current Medical Providers:  Patient Care Team:  Jf Lassiter MD as PCP - General (Internal Medicine)    Outpatient Medications Prior to Visit   Medication Sig Dispense Refill    allopurinol (ZYLOPRIM) 100 MG tablet TAKE 1 TABLET BY MOUTH DAILY 90 tablet 3    amitriptyline (ELAVIL) 25 MG tablet TAKE 1 TABLET BY MOUTH EVERY NIGHT 90 tablet 2    amLODIPine (NORVASC) 5 MG tablet TAKE 1 TABLET BY MOUTH DAILY 90 tablet 3    atorvastatin (LIPITOR) 20 MG tablet TAKE 1 TABLET BY MOUTH DAILY 90 tablet 3    Cholecalciferol (Vitamin D) 50 MCG (2000 UT) tablet Take 1 tablet by mouth Daily. 90 tablet 3    HYDROcodone-acetaminophen (Norco) 5-325 MG per tablet 1.5 tablets twice daily as needed for pain 90 tablet 0    losartan (COZAAR) 100 MG tablet TAKE 1 TABLET BY MOUTH DAILY 90 tablet 3    montelukast (SINGULAIR) 10 MG tablet TAKE 1 TABLET BY MOUTH EVERY NIGHT 90 tablet 3    tamsulosin (FLOMAX) 0.4 MG capsule 24 hr capsule TAKE 1 CAPSULE BY MOUTH DAILY 90 capsule 3    travoprost, BAK free, (TRAVATAN) 0.004 % solution ophthalmic solution Apply 1 drop to eye(s) as directed by provider Daily.      latanoprost (XALATAN) 0.005 % ophthalmic solution  (Patient not taking: Reported on 8/15/2024)       No facility-administered medications prior to visit.     Opioid medication/s are on active medication list.  and I have  evaluated his active treatment plan and pain score trends (see table).  There were no vitals filed for this visit.  I have reviewed the chart for potential of high risk medication and harmful drug interactions in the elderly.        Aspirin is not on active medication list.  Aspirin use is not indicated based on review of current medical condition/s. Risk of harm outweighs potential benefits.  .    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    Mixed hyperlipidemia    Hyperuricemia    Bilateral low back pain with left-sided sciatica    Irritability    Meralgia paresthetica of left side    Diverticulosis    Right-sided low back pain with right-sided sciatica    Right knee pain    Need for prophylactic vaccination and inoculation against influenza    Ureterolithiasis    Kidney stones    Gout    Seasonal allergies    Low serum vitamin D    Degeneration of intervertebral disc of lumbar region    Complication of anesthesia    COVID-19 vaccine administered    Chronic bilateral low back pain with bilateral sciatica    History of primary hyperparathyroidism    Overweight (BMI 25.0-29.9)    BPH associated with nocturia    Reactive airways dysfunction syndrome    Urinary urgency    Encounter for general adult medical examination with abnormal findings    Medicare annual wellness visit, subsequent    History of gout    Chronic bilateral low back pain without sciatica    History of kidney stones    Screening for thyroid disorder    Chronic prescription opiate use    Acute pain of left thigh    Vitamin D deficiency    Orthostasis     Advance Care Planning Advance Directive is on file.  ACP discussion was held with the patient during this visit. Patient has an  "advance directive in EMR which is still valid.             Objective   Vitals:    08/15/24 1430   BP: 108/68   BP Location: Left arm   Patient Position: Sitting   Cuff Size: Adult   Pulse: 104   Temp: 99 °F (37.2 °C)   TempSrc: Temporal   SpO2: 97%   Weight: 85.6 kg (188 lb 12.8 oz)   Height: 172.7 cm (68\")       Estimated body mass index is 28.71 kg/m² as calculated from the following:    Height as of this encounter: 172.7 cm (68\").    Weight as of this encounter: 85.6 kg (188 lb 12.8 oz).    BMI is >= 25 and <30. (Overweight) The following options were offered after discussion;: exercise counseling/recommendations and nutrition counseling/recommendations       Does the patient have evidence of cognitive impairment? No       ECG 12 Lead    Date/Time: 8/15/2024 3:31 PM  Performed by: Jf Lassiter MD    Authorized by: Jf Lassiter MD  Comparison: compared with previous ECG from 2023  Similar to previous ECG  Comparison to previous ECG: Normal sinus rhythm rate 86, right bundle branch block, no ischemic changes, no change versus previous EKG                                                                                                Health  Risk Assessment    Smoking Status:  Social History     Tobacco Use   Smoking Status Never   Smokeless Tobacco Never     Alcohol Consumption:  Social History     Substance and Sexual Activity   Alcohol Use Yes    Alcohol/week: 2.0 - 3.0 standard drinks of alcohol    Types: 2 - 3 Glasses of wine per week    Comment: rarely       Fall Risk Screen  STEADI Fall Risk Assessment was completed, and patient is at LOW risk for falls.Assessment completed on:8/15/2024    Depression Screenin/15/2024     2:32 PM   PHQ-2/PHQ-9 Depression Screening   Little Interest or Pleasure in Doing Things 0-->not at all   Feeling Down, Depressed or Hopeless 0-->not at all   PHQ-9: Brief Depression Severity Measure Score 0     Health Habits and Functional and Cognitive Screening:      " 8/8/2024    10:36 AM   Functional & Cognitive Status   Do you have difficulty preparing food and eating? No   Do you have difficulty bathing yourself, getting dressed or grooming yourself? No   Do you have difficulty using the toilet? No   Do you have difficulty moving around from place to place? No   Do you have trouble with steps or getting out of a bed or a chair? No   Current Diet Well Balanced Diet   Dental Exam Up to date   Eye Exam Up to date   Exercise (times per week) 3 times per week   Current Exercises Include Walking   Do you need help using the phone?  No   Are you deaf or do you have serious difficulty hearing?  No   Do you need help to go to places out of walking distance? No   Do you need help shopping? No   Do you need help preparing meals?  No   Do you need help with housework?  No   Do you need help with laundry? No   Do you need help taking your medications? No   Do you need help managing money? No   Do you ever drive or ride in a car without wearing a seat belt? No   Have you felt unusual stress, anger or loneliness in the last month? No   Who do you live with? Spouse   If you need help, do you have trouble finding someone available to you? No   Have you been bothered in the last four weeks by sexual problems? No   Do you have difficulty concentrating, remembering or making decisions? No           Age-appropriate Screening Schedule:  Refer to the list below for future screening recommendations based on patient's age, sex and/or medical conditions. Orders for these recommended tests are listed in the plan section. The patient has been provided with a written plan.    Health Maintenance List  Health Maintenance   Topic Date Due    RSV Vaccine - Adults (1 - 1-dose 60+ series) Never done    BMI FOLLOWUP  05/24/2024    LIPID PANEL  08/11/2024    INFLUENZA VACCINE  08/01/2024    ZOSTER VACCINE (1 of 2) 11/01/2024 (Originally 4/2/1998)    COVID-19 Vaccine (6 - 2023-24 season) 12/31/2024 (Originally  12/5/2023)    ANNUAL WELLNESS VISIT  08/15/2025    TDAP/TD VACCINES (3 - Td or Tdap) 08/05/2032    HEPATITIS C SCREENING  Completed    Pneumococcal Vaccine 65+  Completed    COLORECTAL CANCER SCREENING  Discontinued                                                                                                                                                CMS Preventative Services Quick Reference  Risk Factors Identified During Encounter  Chronic Pain:  continue prescription medications  Glaucoma or Family History of Glaucoma:  Condition Stable with Current Medications  Immunizations Discussed/Encouraged: Influenza, Shingrix, COVID19, and RSV (Respiratory Syncytial Virus)    The above risks/problems have been discussed with the patient.  Pertinent information has been shared with the patient in the After Visit Summary.  An After Visit Summary and PPPS were made available to the patient.    Follow Up:   Next Medicare Wellness visit to be scheduled in 1 year.         Additional E&M Note during same encounter follows:  Patient has additional, significant, and separately identifiable condition(s)/problem(s) that require work above and beyond the Medicare Wellness Visit     Chief Complaint  Annual Exam    Subjective   HPI  Larry is also being seen today for an annual adult preventative physical exam.  Patient has a history of chronic intermittent back pain with intermittent sciatica, on a regimen of Norco 7.5 mg prescribed twice daily, taken off and on an intermittent basis totaling 60 tablets monthly, this controlling his back pain well with no recent lumbago, though he did have onset 6 weeks ago of rather significant left mid to distal thigh pain prompting him to stop his regular walking regimen, having improved after about a month of rest.  Last visit had the addition of amlodipine 25 mg nightly to benefit his sciatic nerve pain, as well as secondarily to benefit his nocturia with underlying BPH, also taking Flomax and  "regularly followed by urology.  He notes both have significantly improved.  Prediabetic with hemoglobin A1c most recently having normalized at 5.5% in 8/2023, generally following healthy diet with regular physical activity other than reducing last 6 weeks given his left thigh pain as noted above.  History of hypertension taking amlodipine amlodipine 5 mg daily and losartan 100 mg daily, typically blood pressures averaging 120s over 70s, noting he did have in the last week or so positional episode of lightheadedness with paleness that lasted number seconds before resolving.  Wife is checked his blood pressures over the last week averaging anywhere from the 100s to 120s over 70s to 80s.  He has had no further dizzy episodes, no chest pains palpitations dyspnea or edema.  Hyperlipidemia taking atorvastatin 20 mg nightly, history of gout taking allopurinol 100 mg nightly, history of primary hyperparathyroidism with secondary hypercalcemia subsequent resolution status post parathyroidectomy.  Glaucoma bilaterally treated with Xalatan and Travatan drops, indicating regular ophthalmology follow-up with normal eye pressures and no significant visual complaints.  Review of systems otherwise negative.  Health maintenance includes colonoscopy from 7/5/2024 revealing 3 polyps with neurologist recommending 3-year follow-up, EKG performed today and stable with RBBB, recommend updating RSV Shingrix COVID-19 and influenza vaccines through pharmacy, update pertinent labs, obtain urine drug screen and controlled substance agreement given his chronic therapy with Norco.                Objective   Vital Signs:  /68 (BP Location: Left arm, Patient Position: Sitting, Cuff Size: Adult)   Pulse 104   Temp 99 °F (37.2 °C) (Temporal)   Ht 172.7 cm (68\")   Wt 85.6 kg (188 lb 12.8 oz)   SpO2 97%   BMI 28.71 kg/m²   Physical Exam  Vitals and nursing note reviewed.   Constitutional:       General: He is not in acute distress.     " Appearance: Normal appearance. He is not ill-appearing, toxic-appearing or diaphoretic.      Comments: Healthy, NAD, alert and oriented, very spry for his age, BMI 28.7, overall stable   HENT:      Head: Normocephalic and atraumatic.      Comments: Male pattern baldness     Right Ear: Tympanic membrane, ear canal and external ear normal.      Left Ear: Tympanic membrane, ear canal and external ear normal.      Nose: Nose normal. No congestion or rhinorrhea.      Mouth/Throat:      Mouth: Mucous membranes are moist.      Pharynx: Oropharynx is clear.      Comments: Good dentition  Eyes:      Extraocular Movements: Extraocular movements intact.      Conjunctiva/sclera: Conjunctivae normal.      Pupils: Pupils are equal, round, and reactive to light.   Neck:      Vascular: No carotid bruit.      Comments: No periclavicular or axillary or inguinal adenopathy  Cardiovascular:      Rate and Rhythm: Normal rate and regular rhythm.      Pulses: Normal pulses.      Heart sounds: Normal heart sounds. No murmur heard.     No friction rub. No gallop.      Comments: 2+ carotids without bruits, 2+ radial pulses, 2+ femoral pulses without bruits, 2+ bipedal pulses with good perfusion and no dependent edema  Pulmonary:      Effort: Pulmonary effort is normal. No respiratory distress.      Breath sounds: Normal breath sounds. No stridor. No wheezing, rhonchi or rales.   Chest:      Chest wall: No tenderness.   Abdominal:      General: Bowel sounds are normal. There is no distension.      Palpations: Abdomen is soft. There is no mass.      Tenderness: There is no abdominal tenderness. There is no guarding or rebound.      Hernia: No hernia is present.   Genitourinary:     Comments:  and rectal exams deferred as routinely performed by urologist with no acute related concern  Musculoskeletal:         General: Tenderness present. No swelling, deformity or signs of injury. Normal range of motion.      Cervical back: Normal range of  motion and neck supple. No rigidity or tenderness.      Right lower leg: No edema.      Left lower leg: No edema.      Comments: Mild left SI tenderness to palpation, mild left mid to distal left thigh pain overall improved   Lymphadenopathy:      Cervical: No cervical adenopathy.   Skin:     General: Skin is warm and dry.      Capillary Refill: Capillary refill takes less than 2 seconds.      Findings: No lesion or rash.   Neurological:      General: No focal deficit present.      Mental Status: He is alert and oriented to person, place, and time. Mental status is at baseline.      Cranial Nerves: No cranial nerve deficit.      Sensory: No sensory deficit.      Motor: No weakness.      Coordination: Coordination normal.      Gait: Gait normal.      Deep Tendon Reflexes: Reflexes abnormal.      Comments: Negative straight leg raise bilaterally in a supine position, significant diminished right knee jerk, 2+ left knee jerk, 2+ bilateral ankle jerks, stable clinically from multiple prior visits   Psychiatric:         Mood and Affect: Mood normal.         Behavior: Behavior normal.         Thought Content: Thought content normal.         Judgment: Judgment normal.         The following data was reviewed by: Jf Lassiter MD on 08/15/2024:        Assessment and Plan Additional age appropriate preventative wellness advice topics were discussed during today's preventative wellness exam(some topics already addressed during AWV portion of the note above):    Physical Activity: Advised cardiovascular activity 150 minutes per week as tolerated. (example brisk walk for 30 minutes, 5 days a week).     Nutrition: Discussed nutrition plan with patient. Information shared in after visit summary. Goal is for a well balanced diet to enhance overall health.     Healthy Weight: Discussed current and goal BMI with patient. Steps to attain this goal discussed. Information shared in after visit summary.              Medicare annual  wellness visit, subsequent    Encounter for general adult medical examination with abnormal findings  74-year male presenting for his yearly complete physical and Medicare wellness visit.  Health issues being addressed as detailed below, health maintenance includes colonoscopy current from 7/5/2024 with 3 polyps noted, gastroenterologist recommending 3-year follow-up, recommended update RSV Shingrix COVID-19 and seasonal appropriate flu vaccine, EKG stable with RBBB, obtain routine labs, including urine drug screen and controlled substance agreement given his chronic opiate therapy for back pain.  RBBB  EKG today stable with an RBBB, no abnormalities noted otherwise.  Orthostasis  Single episode of positional orthostasis noted about a week ago, blood pressures checked since generally running the 110s to 120s over 70s to 80s though he did have a couple systolic pressures in the 100s.  For now we will continue his amlodipine 5 mg daily and losartan 100 mg daily, monitor blood pressures a couple times daily for the next several weeks.  If he notes significant recurrent positional lightheadedness, or if systolic pressures are consistently in the 100s or lowers, he is to reduce the amlodipine 5 mg down to 0.5 tablets daily and if not noting increasing pressures at that time, then to stop the amlodipine altogether.  Advise accordingly.  Prediabetes  Most recent hemoglobin A1c normalized to 5.5% in 8/2023.  Repeat testing today.  Encouraged healthy lifestyle with diet and exercise  Benign hypertension  EKG today stable with RBBB at baseline.  Very satisfactory blood pressure control currently, generally satisfactory control chronically other than a couple episodes of low normal systolic pressures in the 100s, currently taking amlodipine 5 mg daily and losartan 100 mg daily.  Patient did have a single episode of orthostasis with dizziness positionally noted about a week ago, with none further noted.  Advised to monitor  blood pressures closely on current regimen, and if notes systolic pressures consistently running in the 100s or lower was, then to reduce his amlodipine 5 mg to 0.5 tablets daily to see if this raises pred pressures into the 110s, and if not then to discontinue entirely.  Advise accordingly.  Mixed hyperlipidemia  Taking atorvastatin 20 mg nightly.  Update lipid profile.  Vitamin D deficiency  Update vitamin D level.  Overweight (BMI 25.0-29.9)  BMI mildly elevated but generally stable at 28.  Continue healthy lifestyle efforts with diet and exercise.  History of primary hyperparathyroidism  Status post surgical excision of the left inferior parathyroid adenoma, doing well postoperatively with a normal intact PTH and calcium level.  Repeat calcium level.  He does follow-up intermittently with endocrinology.  History of gout  Check uric acid, noting taking allopurinol 100 mg nightly.  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  Monitor for any anticholinergic side effects.  Acute pain of left thigh  Describes onset about 6 weeks ago of left mid to distal thigh pain in the area of the quadriceps.  Suspect muscular strain.  Has significantly improved with rest.  Gradually increase physical activity as tolerated.  Chronic bilateral low back pain without sciatica  History longstanding back pain with intermittent bilateral sciatica, now without sciatica and having significant improvement in the back pain.  He takes Norco 7.5 mg prescribed twice daily for a total of 60 tablets daily, but typically takes the medication more regularly for a total of 60 tablets monthly, this improving his quality life with no  significant side effects of medication and no suspicion on my part of inappropriate use of medication.  Update controlled substance agreement and urine drug screen today.  Chronic prescription opiate use  On chronic Norco therapy as noted above given her chronic back pain.  Update urine drug screen and controlled substance agreement.  History of kidney stones  History of multiple renal stones.  Continue vigorous hydration.  Screening for thyroid disorder      Orders Placed This Encounter   Procedures    TSH Rfx On Abnormal To Free T4     Standing Status:   Future     Number of Occurrences:   1     Standing Expiration Date:   8/15/2025     Order Specific Question:   Release to patient     Answer:   Routine Release [[id number]]    Comprehensive Metabolic Panel     Standing Status:   Future     Number of Occurrences:   1     Standing Expiration Date:   8/15/2025     Order Specific Question:   Release to patient     Answer:   Routine Release [[id number]]    Lipid Panel     Standing Status:   Future     Number of Occurrences:   1     Standing Expiration Date:   8/15/2025     Order Specific Question:   Release to patient     Answer:   Routine Release [[id number]]    Hemoglobin A1c     Standing Status:   Future     Number of Occurrences:   1     Standing Expiration Date:   8/15/2025     Order Specific Question:   Release to patient     Answer:   Routine Release [[id number]]    Vitamin D,25-Hydroxy     Standing Status:   Future     Number of Occurrences:   1     Standing Expiration Date:   8/15/2025     Order Specific Question:   Release to patient     Answer:   Routine Release [[id number]]    Urinalysis With Culture If Indicated -     Standing Status:   Future     Number of Occurrences:   1     Standing Expiration Date:   8/15/2025     Order Specific Question:   Release to patient     Answer:   Routine Release [[id number]]    MicroAlbumin, Urine, Random - Urine, Clean Catch     Standing Status:   Future     Number of  Occurrences:   1     Standing Expiration Date:   8/15/2025     Order Specific Question:   Release to patient     Answer:   Routine Release [[id number]]    Urine Drug Screen - Urine, Clean Catch     Standing Status:   Future     Number of Occurrences:   1     Standing Expiration Date:   8/15/2025     Order Specific Question:   Release to patient     Answer:   Routine Release [[id number]]    Uric Acid     Standing Status:   Future     Number of Occurrences:   1     Standing Expiration Date:   8/15/2025     Order Specific Question:   Release to patient     Answer:   Routine Release [[id number]]    ECG 12 Lead     Order Specific Question:   Reason for Exam:     Answer:   RBBB, hypertension     Order Specific Question:   Release to patient     Answer:   Routine Release [[id number]]             Follow Up   Return in about 3 months (around 11/15/2024) for Recheck.  Patient was given instructions and counseling regarding his condition or for health maintenance advice. Please see specific information pulled into the AVS if appropriate.

## 2024-08-16 LAB
25(OH)D3+25(OH)D2 SERPL-MCNC: 33.5 NG/ML (ref 30–100)
ALBUMIN SERPL-MCNC: 4.3 G/DL (ref 3.8–4.8)
ALP SERPL-CCNC: 112 IU/L (ref 44–121)
ALT SERPL-CCNC: 18 IU/L (ref 0–44)
AMPHETAMINES UR QL SCN: NEGATIVE NG/ML
APPEARANCE UR: CLEAR
AST SERPL-CCNC: 18 IU/L (ref 0–40)
BACTERIA #/AREA URNS HPF: NORMAL /[HPF]
BARBITURATES UR QL SCN: NEGATIVE NG/ML
BENZODIAZ UR QL SCN: NEGATIVE NG/ML
BILIRUB SERPL-MCNC: 0.7 MG/DL (ref 0–1.2)
BILIRUB UR QL STRIP: NEGATIVE
BUN SERPL-MCNC: 15 MG/DL (ref 8–27)
BUN/CREAT SERPL: 13 (ref 10–24)
BZE UR QL SCN: NEGATIVE NG/ML
CALCIUM SERPL-MCNC: 9.7 MG/DL (ref 8.6–10.2)
CANNABINOIDS UR QL SCN: NEGATIVE NG/ML
CASTS URNS QL MICRO: NORMAL /LPF
CHLORIDE SERPL-SCNC: 102 MMOL/L (ref 96–106)
CHOLEST SERPL-MCNC: 144 MG/DL (ref 100–199)
CO2 SERPL-SCNC: 24 MMOL/L (ref 20–29)
COLOR UR: YELLOW
CREAT SERPL-MCNC: 1.18 MG/DL (ref 0.76–1.27)
CREAT UR-MCNC: 82.2 MG/DL (ref 20–300)
EGFRCR SERPLBLD CKD-EPI 2021: 64 ML/MIN/1.73
EPI CELLS #/AREA URNS HPF: NORMAL /HPF (ref 0–10)
GLOBULIN SER CALC-MCNC: 2.3 G/DL (ref 1.5–4.5)
GLUCOSE SERPL-MCNC: 98 MG/DL (ref 70–99)
GLUCOSE UR QL STRIP: NEGATIVE
HBA1C MFR BLD: 5.7 % (ref 4.8–5.6)
HDLC SERPL-MCNC: 49 MG/DL
HGB UR QL STRIP: NEGATIVE
KETONES UR QL STRIP: NEGATIVE
LABORATORY COMMENT REPORT: ABNORMAL
LDLC SERPL CALC-MCNC: 72 MG/DL (ref 0–99)
LEUKOCYTE ESTERASE UR QL STRIP: NEGATIVE
METHADONE UR QL SCN: NEGATIVE NG/ML
MICRO URNS: NORMAL
MICRO URNS: NORMAL
MICROALBUMIN UR-MCNC: 7.4 UG/ML
NITRITE UR QL STRIP: NEGATIVE
OPIATES UR QL SCN: POSITIVE NG/ML
OXYCODONE+OXYMORPHONE UR QL SCN: NEGATIVE NG/ML
PCP UR QL: NEGATIVE NG/ML
PH UR STRIP: 6 [PH] (ref 5–7.5)
PH UR: 5.1 [PH] (ref 4.5–8.9)
POTASSIUM SERPL-SCNC: 4 MMOL/L (ref 3.5–5.2)
PROPOXYPH UR QL SCN: NEGATIVE NG/ML
PROT SERPL-MCNC: 6.6 G/DL (ref 6–8.5)
PROT UR QL STRIP: NEGATIVE
RBC #/AREA URNS HPF: NORMAL /HPF (ref 0–2)
SODIUM SERPL-SCNC: 140 MMOL/L (ref 134–144)
SP GR UR STRIP: 1.01 (ref 1–1.03)
TRIGL SERPL-MCNC: 132 MG/DL (ref 0–149)
TSH SERPL DL<=0.005 MIU/L-ACNC: 1.93 UIU/ML (ref 0.45–4.5)
URATE SERPL-MCNC: 4.6 MG/DL (ref 3.8–8.4)
URINALYSIS REFLEX: NORMAL
UROBILINOGEN UR STRIP-MCNC: 0.2 MG/DL (ref 0.2–1)
VLDLC SERPL CALC-MCNC: 23 MG/DL (ref 5–40)
WBC #/AREA URNS HPF: NORMAL /HPF (ref 0–5)

## 2024-08-19 ENCOUNTER — TELEPHONE (OUTPATIENT)
Dept: FAMILY MEDICINE CLINIC | Facility: CLINIC | Age: 74
End: 2024-08-19
Payer: MEDICARE

## 2024-08-19 NOTE — TELEPHONE ENCOUNTER
----- Message from Jf Lassiter sent at 8/16/2024  7:47 PM EDT -----  Please advise patient his recent laboratory testing was completely satisfactory including urine drug screen appropriately detecting his Norco, along with hemoglobin A1c or diabetic screen minimally elevated at his typical value of 5.7% consistent with his prediabetes, remainder of testing unremarkable including urine testing, uric acid level, vitamin D, thyroid testing, cholesterol profile, and chemistry profile.  No changes recommended in current regimen at this time    I have spoke with him regarding his lab results. TF

## 2024-08-23 DIAGNOSIS — G89.29 CHRONIC BILATERAL LOW BACK PAIN WITH BILATERAL SCIATICA: ICD-10-CM

## 2024-08-23 DIAGNOSIS — M54.41 CHRONIC BILATERAL LOW BACK PAIN WITH BILATERAL SCIATICA: ICD-10-CM

## 2024-08-23 DIAGNOSIS — Z79.891 CHRONICALLY ON OPIATE THERAPY: ICD-10-CM

## 2024-08-23 DIAGNOSIS — M54.42 CHRONIC BILATERAL LOW BACK PAIN WITH BILATERAL SCIATICA: ICD-10-CM

## 2024-08-23 NOTE — TELEPHONE ENCOUNTER
Caller: Leslie Kwon    Relationship: Emergency Contact    Best call back number: [phone]     Requested Prescriptions:   Requested Prescriptions     Pending Prescriptions Disp Refills    HYDROcodone-acetaminophen (Norco) 5-325 MG per tablet 90 tablet 0     Si.5 tablets twice daily as needed for pain        Pharmacy where request should be sent: [facility] #[id number] [facility] [address] [place] [address]  AT [facility] [phone] [facility] [phone] FX     Last office visit with prescribing clinician: 8/15/2024   Last telemedicine visit with prescribing clinician: Visit date not found   Next office visit with prescribing clinician: 11/15/2024     Does the patient have less than a 3 day supply:  [x] Yes  [] No    Would you like a call back once the refill request has been completed: [] Yes [x] No    If the office needs to give you a call back, can they leave a voicemail: [] Yes [x] No    Sanaz Aldana Rep   24 11:36 EDT

## 2024-08-26 RX ORDER — HYDROCODONE BITARTRATE AND ACETAMINOPHEN 5; 325 MG/1; MG/1
TABLET ORAL
Qty: 90 TABLET | Refills: 0 | Status: SHIPPED | OUTPATIENT
Start: 2024-08-26

## 2024-08-26 NOTE — TELEPHONE ENCOUNTER
PATIENTS WIFE CALLED TO CHECK ON THE STATUS OF THIS REQUEST.    HE TOOK HIS LAST PILL YESTERDAY AND HIS PHARMACY NEEDS A NEW PRESCRIPTION. PLEASE ADVISE [phone]     HYDROcodone-acetaminophen (Norco) 5-325 MG per tablet    [facility] #[id number] - [facility] [address]  AT [facility] [phone] [facility] [phone] FX

## 2024-09-04 DIAGNOSIS — R39.15 URINARY URGENCY: ICD-10-CM

## 2024-09-04 DIAGNOSIS — G89.29 CHRONIC BILATERAL LOW BACK PAIN WITH LEFT-SIDED SCIATICA: ICD-10-CM

## 2024-09-04 DIAGNOSIS — M54.42 CHRONIC BILATERAL LOW BACK PAIN WITH LEFT-SIDED SCIATICA: ICD-10-CM

## 2024-09-04 RX ORDER — AMLODIPINE BESYLATE 5 MG/1
5 TABLET ORAL DAILY
Qty: 90 TABLET | Refills: 3 | Status: SHIPPED | OUTPATIENT
Start: 2024-09-04

## 2024-09-04 RX ORDER — ATORVASTATIN CALCIUM 20 MG/1
20 TABLET, FILM COATED ORAL DAILY
Qty: 90 TABLET | Refills: 3 | Status: SHIPPED | OUTPATIENT
Start: 2024-09-04

## 2024-09-04 RX ORDER — MONTELUKAST SODIUM 10 MG/1
10 TABLET ORAL NIGHTLY
Qty: 90 TABLET | Refills: 3 | Status: SHIPPED | OUTPATIENT
Start: 2024-09-04

## 2024-09-04 RX ORDER — ALLOPURINOL 100 MG/1
100 TABLET ORAL DAILY
Qty: 90 TABLET | Refills: 3 | Status: SHIPPED | OUTPATIENT
Start: 2024-09-04

## 2024-09-04 RX ORDER — LOSARTAN POTASSIUM 100 MG/1
100 TABLET ORAL DAILY
Qty: 90 TABLET | Refills: 3 | Status: SHIPPED | OUTPATIENT
Start: 2024-09-04

## 2024-09-04 NOTE — TELEPHONE ENCOUNTER
Caller: Leslie Kwon    Relationship: Emergency Contact, WIFE WITH PATIENT AT HER SIDE    Best call back number: [phone]     Requested Prescriptions:   Requested Prescriptions     Pending Prescriptions Disp Refills    allopurinol (ZYLOPRIM) 100 MG tablet 90 tablet 3     Sig: Take 1 tablet by mouth Daily.    losartan (COZAAR) 100 MG tablet 90 tablet 3     Sig: Take 1 tablet by mouth Daily.    atorvastatin (LIPITOR) 20 MG tablet 90 tablet 3     Sig: Take 1 tablet by mouth Daily.    amLODIPine (NORVASC) 5 MG tablet 90 tablet 3     Sig: Take 1 tablet by mouth Daily.    amitriptyline (ELAVIL) 25 MG tablet 90 tablet 2     Sig: Take 1 tablet by mouth Every Night.    montelukast (SINGULAIR) 10 MG tablet 90 tablet 3     Sig: Take 1 tablet by mouth Every Night.        Pharmacy where request should be sent: [facility] #[id number] [facility] [address] AT [facility] [phone] [facility] [phone] FX     Last office visit with prescribing clinician: 8/15/2024   Last telemedicine visit with prescribing clinician: Visit date not found   Next office visit with prescribing clinician: 11/15/2024     Additional details provided by patient: PLEASE SEND 90 DAY SUPPLY WITH REFILLS    Does the patient have less than a 3 day supply:  [x] Yes  OUT    Would you like a call back once the refill request has been completed:  [x] No    If the office needs to give you a call back, can they leave a voicemail: [x] Yes     Sanaz Hubbard Rep   09/04/24 12:41 EDT

## 2024-09-10 RX ORDER — ATORVASTATIN CALCIUM 20 MG/1
20 TABLET, FILM COATED ORAL DAILY
Qty: 90 TABLET | Refills: 3 | Status: SHIPPED | OUTPATIENT
Start: 2024-09-10

## 2024-09-10 NOTE — TELEPHONE ENCOUNTER
Caller: Leslie Kwon    Relationship: Emergency Contact    Best call back number: [phone]     Requested Prescriptions:   Requested Prescriptions     Pending Prescriptions Disp Refills    atorvastatin (LIPITOR) 20 MG tablet 90 tablet 3     Sig: Take 1 tablet by mouth Daily.        Pharmacy where request should be sent: [id number] [address]  AT [facility] [phone] [facility] [phone] FX     Last office visit with prescribing clinician: 8/15/2024   Last telemedicine visit with prescribing clinician: Visit date not found   Next office visit with prescribing clinician: 11/15/2024     Additional details provided by patient: PATIENTS WIFE STATES THE PHARMACY SAID THE CURRENT PRESCRIPTION IS  AND THEY NEED DR. BOWER TO CALL THEM ABOUT THIS.     Does the patient have less than a 3 day supply:  [x] Yes  [] No    Would you like a call back once the refill request has been completed: [] Yes [x] No    If the office needs to give you a call back, can they leave a voicemail: [] Yes [x] No    Cadance Dunaway, RegSched Rep   09/10/24 12:53 EDT

## 2024-09-23 DIAGNOSIS — M54.42 CHRONIC BILATERAL LOW BACK PAIN WITH BILATERAL SCIATICA: ICD-10-CM

## 2024-09-23 DIAGNOSIS — G89.29 CHRONIC BILATERAL LOW BACK PAIN WITH BILATERAL SCIATICA: ICD-10-CM

## 2024-09-23 DIAGNOSIS — M54.41 CHRONIC BILATERAL LOW BACK PAIN WITH BILATERAL SCIATICA: ICD-10-CM

## 2024-09-23 DIAGNOSIS — Z79.891 CHRONICALLY ON OPIATE THERAPY: ICD-10-CM

## 2024-09-23 RX ORDER — HYDROCODONE BITARTRATE AND ACETAMINOPHEN 5; 325 MG/1; MG/1
TABLET ORAL
Qty: 90 TABLET | Refills: 0 | Status: SHIPPED | OUTPATIENT
Start: 2024-09-23

## 2024-10-18 DIAGNOSIS — M54.41 CHRONIC BILATERAL LOW BACK PAIN WITH BILATERAL SCIATICA: ICD-10-CM

## 2024-10-18 DIAGNOSIS — M54.42 CHRONIC BILATERAL LOW BACK PAIN WITH BILATERAL SCIATICA: ICD-10-CM

## 2024-10-18 DIAGNOSIS — G89.29 CHRONIC BILATERAL LOW BACK PAIN WITH BILATERAL SCIATICA: ICD-10-CM

## 2024-10-18 DIAGNOSIS — Z79.891 CHRONICALLY ON OPIATE THERAPY: ICD-10-CM

## 2024-10-18 RX ORDER — HYDROCODONE BITARTRATE AND ACETAMINOPHEN 5; 325 MG/1; MG/1
TABLET ORAL
Qty: 90 TABLET | Refills: 0 | Status: SHIPPED | OUTPATIENT
Start: 2024-10-18

## 2024-10-18 NOTE — TELEPHONE ENCOUNTER
Caller: Leslie Kwon    Relationship: Emergency Contact    Best call back number: [phone]    Requested Prescriptions:   Requested Prescriptions     Pending Prescriptions Disp Refills    HYDROcodone-acetaminophen (Norco) 5-325 MG per tablet 90 tablet 0     Si.5 tablets twice daily as needed for pain        Pharmacy where request should be sent:    [facility] [phone]  Last office visit with prescribing clinician: 8/15/2024   Last telemedicine visit with prescribing clinician: Visit date not found   Next office visit with prescribing clinician: 11/15/2024     Additional details provided by patient: PATIENT IS DUE FOR REFILL ON 10/22/2024.    Does the patient have less than a 3 day supply:  [] Yes  [x] No    Would you like a call back once the refill request has been completed: [] Yes [x] No    If the office needs to give you a call back, can they leave a voicemail: [] Yes [x] No    Sanaz Plata Rep   10/18/24 10:21 EDT

## 2024-11-01 DIAGNOSIS — G89.29 CHRONIC BILATERAL LOW BACK PAIN WITH LEFT-SIDED SCIATICA: ICD-10-CM

## 2024-11-01 DIAGNOSIS — M54.42 CHRONIC BILATERAL LOW BACK PAIN WITH LEFT-SIDED SCIATICA: ICD-10-CM

## 2024-11-01 DIAGNOSIS — R39.15 URINARY URGENCY: ICD-10-CM

## 2024-11-01 NOTE — TELEPHONE ENCOUNTER
Caller: Larry Kwon    Relationship: Self    Best call back number:   Telephone Information:   Mobile [phone]        Requested Prescriptions:   Requested Prescriptions     Pending Prescriptions Disp Refills    amitriptyline (ELAVIL) 25 MG tablet 90 tablet 2     Sig: Take 1 tablet by mouth Every Night.        Pharmacy where request should be sent: [facility] #[id number] [facility] [address] [place] [address]  AT [facility] [phone] [facility] [phone] FX     Last office visit with prescribing clinician: 8/15/2024   Last telemedicine visit with prescribing clinician: Visit date not found   Next office visit with prescribing clinician: 11/15/2024     Additional details provided by patient:     Does the patient have less than a 3 day supply:  [] Yes  [x] No    Would you like a call back once the refill request has been completed: [] Yes [x] No    If the office needs to give you a call back, can they leave a voicemail: [] Yes [x] No    Sanaz Francis Rep   11/01/24 15:21 EDT

## 2024-11-18 DIAGNOSIS — Z79.891 CHRONICALLY ON OPIATE THERAPY: ICD-10-CM

## 2024-11-18 DIAGNOSIS — M54.41 CHRONIC BILATERAL LOW BACK PAIN WITH BILATERAL SCIATICA: ICD-10-CM

## 2024-11-18 DIAGNOSIS — M54.42 CHRONIC BILATERAL LOW BACK PAIN WITH BILATERAL SCIATICA: ICD-10-CM

## 2024-11-18 DIAGNOSIS — G89.29 CHRONIC BILATERAL LOW BACK PAIN WITH BILATERAL SCIATICA: ICD-10-CM

## 2024-11-18 RX ORDER — HYDROCODONE BITARTRATE AND ACETAMINOPHEN 5; 325 MG/1; MG/1
TABLET ORAL
Qty: 90 TABLET | Refills: 0 | Status: SHIPPED | OUTPATIENT
Start: 2024-11-18

## 2024-11-18 NOTE — TELEPHONE ENCOUNTER
Caller: Leslie Kwon    Relationship: Emergency Contact    Best call back number: [phone]     Requested Prescriptions:   Requested Prescriptions     Pending Prescriptions Disp Refills    HYDROcodone-acetaminophen (Norco) 5-325 MG per tablet 90 tablet 0     Si.5 tablets twice daily as needed for pain        Pharmacy where request should be sent: [facility][id number] [facility] [address] [place] [address]  AT [facility] [phone] [facility] [phone] FX     Last office visit with prescribing clinician: 8/15/2024   Last telemedicine visit with prescribing clinician: Visit date not found   Next office visit with prescribing clinician: 2024     Additional details provided by patient: COMPLETELY OUT.    Does the patient have less than a 3 day supply:  [x] Yes  [] No    Would you like a call back once the refill request has been completed: [] Yes [x] No    If the office needs to give you a call back, can they leave a voicemail: [] Yes [x] No    Sanaz Tran Rep   24 08:13 EST

## 2024-11-22 ENCOUNTER — OFFICE VISIT (OUTPATIENT)
Dept: FAMILY MEDICINE CLINIC | Facility: CLINIC | Age: 74
End: 2024-11-22
Payer: MEDICARE

## 2024-11-22 VITALS
HEIGHT: 68 IN | BODY MASS INDEX: 29.28 KG/M2 | TEMPERATURE: 97.8 F | SYSTOLIC BLOOD PRESSURE: 115 MMHG | OXYGEN SATURATION: 97 % | WEIGHT: 193.2 LBS | RESPIRATION RATE: 18 BRPM | HEART RATE: 76 BPM | DIASTOLIC BLOOD PRESSURE: 70 MMHG

## 2024-11-22 DIAGNOSIS — I10 BENIGN HYPERTENSION: ICD-10-CM

## 2024-11-22 DIAGNOSIS — M54.42 CHRONIC BILATERAL LOW BACK PAIN WITH LEFT-SIDED SCIATICA: Primary | ICD-10-CM

## 2024-11-22 DIAGNOSIS — G89.29 CHRONIC BILATERAL LOW BACK PAIN WITH LEFT-SIDED SCIATICA: Primary | ICD-10-CM

## 2024-11-22 DIAGNOSIS — R53.83 FATIGUE, UNSPECIFIED TYPE: ICD-10-CM

## 2024-11-22 DIAGNOSIS — R35.1 BPH ASSOCIATED WITH NOCTURIA: ICD-10-CM

## 2024-11-22 DIAGNOSIS — N40.1 BPH ASSOCIATED WITH NOCTURIA: ICD-10-CM

## 2024-11-22 DIAGNOSIS — M65.321 TRIGGER INDEX FINGER OF RIGHT HAND: ICD-10-CM

## 2024-11-22 DIAGNOSIS — M65.322 TRIGGER INDEX FINGER OF LEFT HAND: ICD-10-CM

## 2024-11-22 PROBLEM — K64.0 FIRST DEGREE HEMORRHOIDS: Status: ACTIVE | Noted: 2018-02-12

## 2024-11-22 PROBLEM — Z86.0100 HISTORY OF COLONIC POLYPS: Status: ACTIVE | Noted: 2018-02-12

## 2024-11-22 RX ORDER — TAMSULOSIN HYDROCHLORIDE 0.4 MG/1
1 CAPSULE ORAL DAILY
Qty: 90 CAPSULE | Refills: 3 | Status: SHIPPED | OUTPATIENT
Start: 2024-11-22

## 2024-11-22 NOTE — ASSESSMENT & PLAN NOTE
Clinical evidence of trigger finger with minimal flexion contractures of both index fingers.  Patient does not feel he requires orthopedic evaluation at this time.  If becomes more problematic will then refer to orthopedic surgery.

## 2024-11-22 NOTE — ASSESSMENT & PLAN NOTE
Chronic lumbago with intermittent left-sided sciatica.  Overall clinically stable with some variation.  Get good clinical control taking Norco 7.5 mg twice daily, 3 months ago having initiated trial of amitriptyline 25 mg nightly which she does believe helps his symptoms.  Continue current regimen.  [place] screen appropriate, UDS up-to-date.  Does not require refill today.  Follow-up in 3 months for further review per standard protocol

## 2024-11-22 NOTE — PROGRESS NOTES
Venipuncture Blood Specimen Collection  Venipuncture performed in left arm by Lisette De Souza MA with good hemostasis. Patient tolerated the procedure well without complications.   11/22/24   Lisette De Souza

## 2024-11-22 NOTE — ASSESSMENT & PLAN NOTE
Specific fatigue for the last year, overall appears to have good sleep quality, some irritability but not depressed or anxious with unremarkable screening testing, basic lab screening 3 months ago was unremarkable.  Started on amitriptyline 3 months ago but symptoms preceded with no change since this medication was initiated.  Historically is tolerating his Norco well will repeat a TSH, adding CBC, B12 folic acid B1.  Encouraged regular sleep.  Encouraged increasing physical activity.

## 2024-11-22 NOTE — ASSESSMENT & PLAN NOTE
Patient gets good symptom control taking his tamsulosin 0.4 mg daily.  Regularly followed by urology.  Refill medication

## 2024-11-22 NOTE — PROGRESS NOTES
Answers submitted by the patient for this visit:  Primary Reason for Visit (Submitted on 2024)  What is the primary reason for your visit?: Problem Not Listed  Problem not listed (Submitted on 2024)  Chief Complaint: Other medical problem  anorexia: No  joint pain: No  change in stool: No  headaches: No  joint swelling: No  vertigo: No  visual change: No  Other symptom: Weight gain and fatigue      Follow Up Office Visit      Date: 2024   Patient Name: Larry Kwon  : 1950   MRN: [id number]     Chief Complaint:    Chief Complaint   Patient presents with    follow up pain meds       History of Present Illness: Larry Kwon is a 74 y.o. male who is here today for routine 3-month review of his chronic pain primarily low back and left-sided sciatic pain for which she takes Norco 7.5 mg twice daily.  Relates overall he gets satisfactory control of his discomfort with no major side effects, having an improved quality of life.  His blood pressure has been normal taking amlodipine and losartan averaging 110s to 120s over 70s to low , no chest pains palpitations dyspnea dizziness or edema.  He has 2 major issues he like to address, 1 of which is a 1 year history of fatigue, more describes decreased energy rather than sleepiness, indicating he gets about 8 hours of sleep as he can best determine, rarely noted to have snoring, denies being frankly depressed or anxious though he does have some irritability noting significant stressors in his job is now returning.  He works about 40 hours weekly.  Did have some laboratory testing in 2024 including TSH that was unremarkable.  Secondarily, patient notes for several months now having some flexion contractures of both index fingers without significant discomfort but noting some locking intermittently.  No numbness or tingling and no weakness..    Subjective      Review of Systems:   Review of Systems    I have reviewed the patients family history, social  "history, past medical history, past surgical history and have updated it as appropriate.     Medications:     Current Outpatient Medications:     allopurinol (ZYLOPRIM) 100 MG tablet, Take 1 tablet by mouth Daily., Disp: 90 tablet, Rfl: 3    amitriptyline (ELAVIL) 25 MG tablet, Take 1 tablet by mouth Every Night., Disp: 90 tablet, Rfl: 2    amLODIPine (NORVASC) 5 MG tablet, Take 1 tablet by mouth Daily., Disp: 90 tablet, Rfl: 3    atorvastatin (LIPITOR) 20 MG tablet, Take 1 tablet by mouth Daily., Disp: 90 tablet, Rfl: 3    Cholecalciferol (Vitamin D) 50 MCG (2000 UT) tablet, Take 1 tablet by mouth Daily., Disp: 90 tablet, Rfl: 3    HYDROcodone-acetaminophen (Norco) 5-325 MG per tablet, 1.5 tablets twice daily as needed for pain, Disp: 90 tablet, Rfl: 0    losartan (COZAAR) 100 MG tablet, Take 1 tablet by mouth Daily., Disp: 90 tablet, Rfl: 3    montelukast (SINGULAIR) 10 MG tablet, Take 1 tablet by mouth Every Night., Disp: 90 tablet, Rfl: 3    tamsulosin (FLOMAX) 0.4 MG capsule 24 hr capsule, Take 1 capsule by mouth Daily., Disp: 90 capsule, Rfl: 3    travoprost, BAK free, (TRAVATAN) 0.004 % solution ophthalmic solution, Apply 1 drop to eye(s) as directed by provider Daily., Disp: , Rfl:     Allergies:   Allergies   Allergen Reactions    Ace Inhibitors Cough    Chlorhexidine Rash     Likely to be chloroprep, not necessarily chlorhexidine       Objective     Physical Exam: Please see above  Vital Signs:   Vitals:    11/22/24 1100   BP: 115/70   BP Location: Left arm   Patient Position: Sitting   Cuff Size: Adult   Pulse: 76   Resp: 18   Temp: 97.8 °F (36.6 °C)   TempSrc: Temporal   SpO2: 97%   Weight: 87.6 kg (193 lb 3.2 oz)   Height: 172.7 cm (68\")   PainSc:   2   PainLoc: Leg     Body mass index is 29.38 kg/m².          Physical Exam  Constitutional:       General: He is not in acute distress.     Appearance: Normal appearance. He is not ill-appearing.      Comments: Well-groomed, well-spoken, NAD, BMI 29.3 "   Cardiovascular:      Rate and Rhythm: Normal rate and regular rhythm.      Heart sounds: Normal heart sounds. No murmur heard.     No friction rub. No gallop.   Pulmonary:      Effort: Pulmonary effort is normal. No respiratory distress.      Breath sounds: Normal breath sounds.   Musculoskeletal:         General: Tenderness and deformity present.      Cervical back: No rigidity or tenderness.      Comments: Bilateral index fingers with very subtle flexion contractures, no pain to palpation of the fingers and cells but does have discomfort to palpation on the flexor aspect of the palms of the hands correlating to the second MCP region and related tenderness, noting some popping of the flexor tendon of the left index finger.  Chronic left lumbar discomfort to palpation, negative straight leg raise bilaterally in a seated position   Lymphadenopathy:      Cervical: No cervical adenopathy.   Neurological:      General: No focal deficit present.      Mental Status: He is alert and oriented to person, place, and time.      Cranial Nerves: No cranial nerve deficit.      Sensory: No sensory deficit.      Motor: No weakness.      Coordination: Coordination normal.      Gait: Gait normal.      Deep Tendon Reflexes: Reflexes abnormal.      Comments: Chronically diminished right knee jerk, 2+ left knee jerk, 2+ bilateral ankle jerks stable from prior office visits, normal strength and sensation in his lower extremities         Procedures    Results:   Labs:   Hemoglobin A1C   Date Value Ref Range Status   08/15/2024 5.7 (H) 4.8 - 5.6 % Final     Comment:              Prediabetes: 5.7 - 6.4           Diabetes: >6.4           Glycemic control for adults with diabetes: <7.0     05/19/2023 5.6 % Final   11/04/2021 5.4 4.3 - 5.6 % Final     Comment:     (note)  A1C% Reference Range:  4.3 - 5.6  Normal range  5.7 - 6.4  Pre-diabetic -increased risk for developing diabetes  mellitus.  >=6.5      Diabetic -diagnostic of diabetes  mellitus.    Note: For diagnosis of diabetes in individuals without unequivocal  hyperglycemia, results should be confirmed by repeat testing.  Patients with conditions that shorten erythrocyte survival, such as  recovery from acute blood loss, hemolytic anemia, kidney disease,  or the presence of unstable hemogloblins like HbSS, HbCC, and HbSC  may yield falsely decreased HbA1c test results. Iron deficiency may  yield falsely increased HbA1c test results.     TSH   Date Value Ref Range Status   08/15/2024 1.930 0.450 - 4.500 uIU/mL Final   10/30/2020 1.460 0.470 - 4.680 u(iU)/mL Final     Comment:     (note)  Higher dose biotin supplements may interfere with this test.  Interpret results within the context of patient's clinical picture.        POCT Results (if applicable):   Results for orders placed or performed in visit on 08/15/24   Uric Acid    Collection Time: 08/15/24  3:32 PM    Specimen: Arm, Right; Blood    Blood  Release to bruno   Result Value Ref Range    Uric Acid 4.6 3.8 - 8.4 mg/dL   Vitamin D,25-Hydroxy    Collection Time: 08/15/24  3:32 PM    Specimen: Arm, Right; Blood    Blood  Release to bruno   Result Value Ref Range    25 Hydroxy, Vitamin D 33.5 30.0 - 100.0 ng/mL   Hemoglobin A1c    Collection Time: 08/15/24  3:32 PM    Specimen: Arm, Right; Blood    Blood  Release to bruno   Result Value Ref Range    Hemoglobin A1C 5.7 (H) 4.8 - 5.6 %   Lipid Panel    Collection Time: 08/15/24  3:32 PM    Specimen: Arm, Right; Blood    Blood  Release to bruno   Result Value Ref Range    Total Cholesterol 144 100 - 199 mg/dL    Triglycerides 132 0 - 149 mg/dL    HDL Cholesterol 49 >39 mg/dL    VLDL Cholesterol Dhruv 23 5 - 40 mg/dL    LDL Chol Calc (NIH) 72 0 - 99 mg/dL   Comprehensive Metabolic Panel    Collection Time: 08/15/24  3:32 PM    Specimen: Arm, Right; Blood    Blood  Release to bruno   Result Value Ref Range    Glucose 98 70 - 99 mg/dL    BUN 15 8 - 27 mg/dL    Creatinine 1.18 0.76 - 1.27 mg/dL    EGFR  Result 64 >59 mL/min/1.73    BUN/Creatinine Ratio 13 10 - 24    Sodium 140 134 - 144 mmol/L    Potassium 4.0 3.5 - 5.2 mmol/L    Chloride 102 96 - 106 mmol/L    Total CO2 24 20 - 29 mmol/L    Calcium 9.7 8.6 - 10.2 mg/dL    Total Protein 6.6 6.0 - 8.5 g/dL    Albumin 4.3 3.8 - 4.8 g/dL    Globulin 2.3 1.5 - 4.5 g/dL    Total Bilirubin 0.7 0.0 - 1.2 mg/dL    Alkaline Phosphatase 112 44 - 121 IU/L    AST (SGOT) 18 0 - 40 IU/L    ALT (SGPT) 18 0 - 44 IU/L   TSH Rfx On Abnormal To Free T4    Collection Time: 08/15/24  3:32 PM    Specimen: Arm, Right; Blood    Blood  Release to bruno   Result Value Ref Range    TSH 1.930 0.450 - 4.500 uIU/mL   Urine Drug Screen - Urine, Clean Catch    Collection Time: 08/15/24  3:33 PM    Specimen: Urine, Clean Catch    Urine  Release to bruno   Result Value Ref Range    Amphetamine, Urine Qual Negative Gdjgza=9051 ng/mL    Barbiturates Screen, Urine Negative Ubtnod=537 ng/mL    Benzodiazepine Screen, Urine Negative Leprjm=959 ng/mL    THC Screen, Urine Negative Cutoff=20 ng/mL    Cocaine Screen, Urine Negative Skgqxv=164 ng/mL    Opiate Screen, Urine Positive (A) Gauxfn=214 ng/mL    Oxycodone/Oxymorphone, Urine Negative Czdtdr=777 ng/mL    Phencyclidine (PCP), Urine Negative Cutoff=25 ng/mL    Methadone Screen, Urine Negative Cqbkrp=768 ng/mL    Propoxyphene Screen Negative Duekyx=989 ng/mL    Creatinine, Urine 82.2 20.0 - 300.0 mg/dL    pH, UA 5.1 4.5 - 8.9    Please note Comment    MicroAlbumin, Urine, Random - Urine, Clean Catch    Collection Time: 08/15/24  3:33 PM    Specimen: Urine, Clean Catch    Urine  Release to bruno   Result Value Ref Range    Microalbumin, Urine 7.4 Not Estab. ug/mL   Urinalysis With Culture If Indicated - Urine, Clean Catch    Collection Time: 08/15/24  3:33 PM    Specimen: Urine, Clean Catch    Urine  Release to bruno   Result Value Ref Range    Specific Gravity, UA 1.009 1.005 - 1.030    pH, UA 6.0 5.0 - 7.5    Color, UA Yellow Yellow    Appearance, UA Clear  Clear    Leukocytes, UA Negative Negative    Protein Negative Negative/Trace    Glucose, UA Negative Negative    Ketones Negative Negative    Blood, UA Negative Negative    Bilirubin, UA Negative Negative    Urobilinogen, UA 0.2 0.2 - 1.0 mg/dL    Nitrite, UA Negative Negative    Microscopic Examination Comment     Microscopic Examination See below:     Urinalysis Reflex Comment    Microscopic Examination -    Collection Time: 08/15/24  3:33 PM    Urine  Release to bruno   Result Value Ref Range    WBC, UA None seen 0 - 5 /hpf    RBC, UA None seen 0 - 2 /hpf    Epithelial Cells (non renal) None seen 0 - 10 /hpf    Casts None seen None seen /lpf    Bacteria, UA None seen None seen/Few       Mood disorder questionnaire, MARLEN-7 screen for anxiety PHQ-9 screens all normal.  Fatigue severity score and sleep [place] Sleepiness Scale screen are negative/normal.    Assessment / Plan      Assessment/Plan:   Diagnoses and all orders for this visit:    1. Chronic bilateral low back pain with left-sided sciatica (Primary)  Assessment & Plan:  Chronic lumbago with intermittent left-sided sciatica.  Overall clinically stable with some variation.  Get good clinical control taking Norco 7.5 mg twice daily, 3 months ago having initiated trial of amitriptyline 25 mg nightly which she does believe helps his symptoms.  Continue current regimen.  [place] screen appropriate, UDS up-to-date.  Does not require refill today.  Follow-up in 3 months for further review per standard protocol      2. Trigger index finger of right hand  Assessment & Plan:  Clinical evidence of trigger finger with minimal flexion contractures of both index fingers.  Patient does not feel he requires orthopedic evaluation at this time.  If becomes more problematic will then refer to orthopedic surgery.      3. Trigger index finger of left hand  Assessment & Plan:  Clinical evidence of trigger finger with minimal flexion contractures of both index fingers.  Patient  does not feel he requires orthopedic evaluation at this time.  If becomes more problematic will then refer to orthopedic surgery.      4. Fatigue, unspecified type  Assessment & Plan:  Specific fatigue for the last year, overall appears to have good sleep quality, some irritability but not depressed or anxious with unremarkable screening testing, basic lab screening 3 months ago was unremarkable.  Started on amitriptyline 3 months ago but symptoms preceded with no change since this medication was initiated.  Historically is tolerating his Norco well will repeat a TSH, adding CBC, B12 folic acid B1.  Encouraged regular sleep.  Encouraged increasing physical activity.    Orders:  -     TSH Rfx On Abnormal To Free T4; Future  -     CBC & Differential; Future  -     Comprehensive Metabolic Panel; Future  -     Vitamin B12; Future  -     Cancel: Vitamin B1, Whole Blood; Future  -     Folate; Future  -     Folate  -     Cancel: Vitamin B1, Whole Blood  -     Vitamin B12  -     Comprehensive Metabolic Panel  -     CBC & Differential  -     TSH Rfx On Abnormal To Free T4  -     Vitamin B1, Whole Blood; Future  -     Vitamin B1, Whole Blood    5. Benign hypertension  Assessment & Plan:  Satisfactory blood pressure control acutely as well as chronically by history taking amlodipine 5 mg daily and losartan 100 mg daily.  Continue current regimen with monitoring.      6. BPH associated with nocturia  Assessment & Plan:  Patient gets good symptom control taking his tamsulosin 0.4 mg daily.  Regularly followed by urology.  Refill medication    Orders:  -     tamsulosin (FLOMAX) 0.4 MG capsule 24 hr capsule; Take 1 capsule by mouth Daily.  Dispense: 90 capsule; Refill: 3        Vaccine Counseling:      Follow Up:   Return in about 3 months (around 2/22/2025) for Recheck.      At [facility], we believe that sharing information builds trust and better relationships. You are receiving this note because you recently visited  [facility]. It is possible you will see health information before a provider has talked with you about it. This kind of information can be easy to misunderstand. To help you fully understand what it means for your health, we urge you to discuss this note with your provider.    Jf Lassiter MD  [facility]

## 2024-11-22 NOTE — ASSESSMENT & PLAN NOTE
Satisfactory blood pressure control acutely as well as chronically by history taking amlodipine 5 mg daily and losartan 100 mg daily.  Continue current regimen with monitoring.

## 2024-11-23 LAB
ALBUMIN SERPL-MCNC: 4.2 G/DL (ref 3.8–4.8)
ALP SERPL-CCNC: 127 IU/L (ref 44–121)
ALT SERPL-CCNC: 17 IU/L (ref 0–44)
AST SERPL-CCNC: 17 IU/L (ref 0–40)
BASOPHILS # BLD AUTO: 0 X10E3/UL (ref 0–0.2)
BASOPHILS NFR BLD AUTO: 1 %
BILIRUB SERPL-MCNC: 0.6 MG/DL (ref 0–1.2)
BUN SERPL-MCNC: 13 MG/DL (ref 8–27)
BUN/CREAT SERPL: 12 (ref 10–24)
CALCIUM SERPL-MCNC: 9.5 MG/DL (ref 8.6–10.2)
CHLORIDE SERPL-SCNC: 104 MMOL/L (ref 96–106)
CO2 SERPL-SCNC: 25 MMOL/L (ref 20–29)
CREAT SERPL-MCNC: 1.13 MG/DL (ref 0.76–1.27)
EGFRCR SERPLBLD CKD-EPI 2021: 67 ML/MIN/1.73
EOSINOPHIL # BLD AUTO: 0.2 X10E3/UL (ref 0–0.4)
EOSINOPHIL NFR BLD AUTO: 5 %
ERYTHROCYTE [DISTWIDTH] IN BLOOD BY AUTOMATED COUNT: 12.9 % (ref 11.6–15.4)
FOLATE SERPL-MCNC: 7.6 NG/ML
GLOBULIN SER CALC-MCNC: 2.6 G/DL (ref 1.5–4.5)
GLUCOSE SERPL-MCNC: 90 MG/DL (ref 70–99)
HCT VFR BLD AUTO: 45.2 % (ref 37.5–51)
HGB BLD-MCNC: 14.8 G/DL (ref 13–17.7)
IMM GRANULOCYTES # BLD AUTO: 0 X10E3/UL (ref 0–0.1)
IMM GRANULOCYTES NFR BLD AUTO: 0 %
LYMPHOCYTES # BLD AUTO: 1.1 X10E3/UL (ref 0.7–3.1)
LYMPHOCYTES NFR BLD AUTO: 22 %
MCH RBC QN AUTO: 30.6 PG (ref 26.6–33)
MCHC RBC AUTO-ENTMCNC: 32.7 G/DL (ref 31.5–35.7)
MCV RBC AUTO: 94 FL (ref 79–97)
MONOCYTES # BLD AUTO: 0.5 X10E3/UL (ref 0.1–0.9)
MONOCYTES NFR BLD AUTO: 10 %
NEUTROPHILS # BLD AUTO: 3.1 X10E3/UL (ref 1.4–7)
NEUTROPHILS NFR BLD AUTO: 62 %
PLATELET # BLD AUTO: 219 X10E3/UL (ref 150–450)
POTASSIUM SERPL-SCNC: 4.6 MMOL/L (ref 3.5–5.2)
PROT SERPL-MCNC: 6.8 G/DL (ref 6–8.5)
RBC # BLD AUTO: 4.83 X10E6/UL (ref 4.14–5.8)
SODIUM SERPL-SCNC: 141 MMOL/L (ref 134–144)
TSH SERPL DL<=0.005 MIU/L-ACNC: 2.19 UIU/ML (ref 0.45–4.5)
VIT B12 SERPL-MCNC: 329 PG/ML (ref 232–1245)
WBC # BLD AUTO: 5 X10E3/UL (ref 3.4–10.8)

## 2024-11-27 ENCOUNTER — TELEPHONE (OUTPATIENT)
Dept: FAMILY MEDICINE CLINIC | Facility: CLINIC | Age: 74
End: 2024-11-27
Payer: MEDICARE

## 2024-11-27 LAB — VIT B1 BLD-SCNC: 122.2 NMOL/L (ref 66.5–200)

## 2024-11-27 NOTE — TELEPHONE ENCOUNTER
----- Message from Jf Lassiter sent at 11/27/2024  3:36 PM EST -----  Please advise patient that recently obtained lab testing was all satisfactory including his chemistry profile, vitamin B1, B12, folic acid, CBC, and thyroid testing.  No changes in his regimen recommended at this time    I have left him a vm regarding his labs. I have asked that he call if he has any questions. TF

## 2024-12-12 DIAGNOSIS — M54.42 CHRONIC BILATERAL LOW BACK PAIN WITH BILATERAL SCIATICA: ICD-10-CM

## 2024-12-12 DIAGNOSIS — G89.29 CHRONIC BILATERAL LOW BACK PAIN WITH BILATERAL SCIATICA: ICD-10-CM

## 2024-12-12 DIAGNOSIS — M54.41 CHRONIC BILATERAL LOW BACK PAIN WITH BILATERAL SCIATICA: ICD-10-CM

## 2024-12-12 DIAGNOSIS — Z79.891 CHRONICALLY ON OPIATE THERAPY: ICD-10-CM

## 2024-12-12 RX ORDER — HYDROCODONE BITARTRATE AND ACETAMINOPHEN 5; 325 MG/1; MG/1
TABLET ORAL
Qty: 90 TABLET | Refills: 0 | Status: SHIPPED | OUTPATIENT
Start: 2024-12-12

## 2024-12-12 NOTE — TELEPHONE ENCOUNTER
Caller: Leslie Kwon    Relationship: Emergency Contact    Best call back number: [phone]    Requested Prescriptions:   Requested Prescriptions     Pending Prescriptions Disp Refills    HYDROcodone-acetaminophen (Norco) 5-325 MG per tablet 90 tablet 0     Si.5 tablets twice daily as needed for pain        [facility]arm[id number] [address] [place]  AT [facility] [phone] [facility] [phone] FX     Last office visit with prescribing clinician: 2024   Last telemedicine visit with prescribing clinician: Visit date not found   Next office visit with prescribing clinician: 2025         Does the patient have less than a 3 day supply:  [] Yes  [x] No    Would you like a call back once the refill request has been completed: [] Yes [x] No    If the office needs to give you a call back, can they leave a voicemail: [] Yes [x] No    Sanaz Montemayor Rep   24 08:51 EST

## 2025-01-08 DIAGNOSIS — Z79.891 CHRONICALLY ON OPIATE THERAPY: ICD-10-CM

## 2025-01-08 DIAGNOSIS — G89.29 CHRONIC BILATERAL LOW BACK PAIN WITH BILATERAL SCIATICA: ICD-10-CM

## 2025-01-08 DIAGNOSIS — M54.42 CHRONIC BILATERAL LOW BACK PAIN WITH BILATERAL SCIATICA: ICD-10-CM

## 2025-01-08 DIAGNOSIS — M54.41 CHRONIC BILATERAL LOW BACK PAIN WITH BILATERAL SCIATICA: ICD-10-CM

## 2025-01-08 RX ORDER — HYDROCODONE BITARTRATE AND ACETAMINOPHEN 5; 325 MG/1; MG/1
TABLET ORAL
Qty: 90 TABLET | Refills: 0 | Status: SHIPPED | OUTPATIENT
Start: 2025-01-08

## 2025-01-08 NOTE — TELEPHONE ENCOUNTER
Caller: Leslie Kwon    Relationship: Emergency Contact    Best call back number: [phone]     Requested Prescriptions:   Requested Prescriptions     Pending Prescriptions Disp Refills    HYDROcodone-acetaminophen (Norco) 5-325 MG per tablet 90 tablet 0     Si.5 tablets twice daily as needed for pain        Pharmacy where request should be sent: [facility] #[id number] [facility] [address] [place] [address]  AT [facility] [phone] [facility] [phone] FX     Last office visit with prescribing clinician: 2024   Last telemedicine visit with prescribing clinician: Visit date not found   Next office visit with prescribing clinician: 2025     Additional details provided by patient: PATIENT HAS 3 DAYS REMAINING      Does the patient have less than a 3 day supply:  [] Yes  [x] No    Would you like a call back once the refill request has been completed: [] Yes [x] No    If the office needs to give you a call back, can they leave a voicemail: [] Yes [x] No    Sanaz Kumari Rep   25 10:43 EST

## 2025-02-10 DIAGNOSIS — M54.42 CHRONIC BILATERAL LOW BACK PAIN WITH BILATERAL SCIATICA: ICD-10-CM

## 2025-02-10 DIAGNOSIS — Z79.891 CHRONICALLY ON OPIATE THERAPY: ICD-10-CM

## 2025-02-10 DIAGNOSIS — G89.29 CHRONIC BILATERAL LOW BACK PAIN WITH BILATERAL SCIATICA: ICD-10-CM

## 2025-02-10 DIAGNOSIS — M54.41 CHRONIC BILATERAL LOW BACK PAIN WITH BILATERAL SCIATICA: ICD-10-CM

## 2025-02-10 RX ORDER — HYDROCODONE BITARTRATE AND ACETAMINOPHEN 5; 325 MG/1; MG/1
TABLET ORAL
Qty: 90 TABLET | Refills: 0 | Status: SHIPPED | OUTPATIENT
Start: 2025-02-10

## 2025-02-10 NOTE — TELEPHONE ENCOUNTER
Caller: Leslie Kwon    Relationship: Emergency Contact    Best call back number:   Requested Prescriptions:   Requested Prescriptions     Pending Prescriptions Disp Refills    HYDROcodone-acetaminophen (Norco) 5-325 MG per tablet 90 tablet 0     Si.5 tablets twice daily as needed for pain        [facility] be [id number] [address] [place] [address]  AT [facility] [phone] [facility] [phone] FX     Last office visit with prescribing clinician: 2024   Last telemedicine visit with prescribing clinician: Visit date not found   Next office visit with prescribing clinician: 2025     Additional details provided by patient: PATIENT IS OUT OF MEDICATION    Does the patient have less than a 3 day supply:  [x] Yes  [] No      Sanaz Marmolejo Rep   02/10/25 09:19 EST

## 2025-02-24 ENCOUNTER — OFFICE VISIT (OUTPATIENT)
Dept: FAMILY MEDICINE CLINIC | Facility: CLINIC | Age: 75
End: 2025-02-24
Payer: MEDICARE

## 2025-02-24 VITALS
HEART RATE: 81 BPM | SYSTOLIC BLOOD PRESSURE: 111 MMHG | BODY MASS INDEX: 28.79 KG/M2 | DIASTOLIC BLOOD PRESSURE: 71 MMHG | OXYGEN SATURATION: 95 % | HEIGHT: 68 IN | WEIGHT: 190 LBS | TEMPERATURE: 97.8 F

## 2025-02-24 DIAGNOSIS — G89.29 CHRONIC BILATERAL LOW BACK PAIN WITH LEFT-SIDED SCIATICA: Primary | ICD-10-CM

## 2025-02-24 DIAGNOSIS — H61.92 SKIN LESION OF LEFT EXTERNAL EAR: ICD-10-CM

## 2025-02-24 DIAGNOSIS — M54.42 CHRONIC BILATERAL LOW BACK PAIN WITH LEFT-SIDED SCIATICA: Primary | ICD-10-CM

## 2025-02-24 DIAGNOSIS — I10 BENIGN HYPERTENSION: ICD-10-CM

## 2025-02-24 PROCEDURE — G2211 COMPLEX E/M VISIT ADD ON: HCPCS | Performed by: INTERNAL MEDICINE

## 2025-02-24 PROCEDURE — 1160F RVW MEDS BY RX/DR IN RCRD: CPT | Performed by: INTERNAL MEDICINE

## 2025-02-24 PROCEDURE — 3078F DIAST BP <80 MM HG: CPT | Performed by: INTERNAL MEDICINE

## 2025-02-24 PROCEDURE — 1159F MED LIST DOCD IN RCRD: CPT | Performed by: INTERNAL MEDICINE

## 2025-02-24 PROCEDURE — 1126F AMNT PAIN NOTED NONE PRSNT: CPT | Performed by: INTERNAL MEDICINE

## 2025-02-24 PROCEDURE — 3074F SYST BP LT 130 MM HG: CPT | Performed by: INTERNAL MEDICINE

## 2025-02-24 PROCEDURE — 99214 OFFICE O/P EST MOD 30 MIN: CPT | Performed by: INTERNAL MEDICINE

## 2025-02-24 NOTE — ASSESSMENT & PLAN NOTE
Continues with chronic lumbago with intermittent left-sided sciatica.  Continues to be clinically stable with some variation.  Overall maintaining good pain control taking Norco 7.5 mg twice daily and amitriptyline 25 mg nightly which does seem to benefit his symptoms.  Continue current regimen.  [place] screen appropriate, UDS up-to-date.  Does not require refill today.  Follow-up in 3 months for further review per standard protocol

## 2025-02-24 NOTE — PROGRESS NOTES
Answers submitted by the patient for this visit:  Problem not listed (Submitted on 2025)  Chief Complaint: Other medical problem  Reason for appointment: Routine quaterly Appoiontment  anorexia: No  joint pain: No  change in stool: No  headaches: No  joint swelling: No  vertigo: No  visual change: No  Medications tried: NA  Additional information: NA      Follow Up Office Visit      Date: 2025   Patient Name: Larry Kwon  : 1950   MRN: [id number]     Chief Complaint:    Chief Complaint   Patient presents with    follow up pain rx       History of Present Illness: Larry Kwon is a 74 y.o. male who is here today for routine 3-month follow-up visit given his chronic opiate therapy in the form of Norco 7.5 mg prescribed twice daily in addition to amitriptyline 25 mg nightly for chronic low back pain with intermittent left-sided sciatica.  He relates things overall are stable on his regimen with an improved quality life and no significant side effects.  He is up-to-date with his UDS and controlled substance agreement.  He has hypertension taking losartan and amlodipine with blood pressures averaging 120s over 70s denying chest pains palpitations dyspnea dizziness or edema.  He does have a skin lesion on his left mid ear pinna appear to be unchanged and present for about 2 weeks, that he would like to have assessed.  Does have a dermatology evaluation next month.  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    Subjective      Review of Systems:   Review of Systems    I have reviewed the patients family history, social history, past medical history, past surgical history and have updated it as appropriate.     Medications:     Current Outpatient Medications:     allopurinol (ZYLOPRIM) 100 MG tablet, Take 1 tablet by mouth Daily., Disp: 90 tablet, Rfl: 3    amitriptyline (ELAVIL) 25 MG tablet, Take 1 tablet by mouth Every Night., Disp: 90 tablet, Rfl: 2     "amLODIPine (NORVASC) 5 MG tablet, Take 1 tablet by mouth Daily., Disp: 90 tablet, Rfl: 3    atorvastatin (LIPITOR) 20 MG tablet, Take 1 tablet by mouth Daily., Disp: 90 tablet, Rfl: 3    Cholecalciferol (Vitamin D) 50 MCG (2000 UT) tablet, Take 1 tablet by mouth Daily., Disp: 90 tablet, Rfl: 3    HYDROcodone-acetaminophen (Norco) 5-325 MG per tablet, 1.5 tablets twice daily as needed for pain, Disp: 90 tablet, Rfl: 0    losartan (COZAAR) 100 MG tablet, Take 1 tablet by mouth Daily., Disp: 90 tablet, Rfl: 3    montelukast (SINGULAIR) 10 MG tablet, Take 1 tablet by mouth Every Night., Disp: 90 tablet, Rfl: 3    tamsulosin (FLOMAX) 0.4 MG capsule 24 hr capsule, Take 1 capsule by mouth Daily., Disp: 90 capsule, Rfl: 3    travoprost, BAK free, (TRAVATAN) 0.004 % solution ophthalmic solution, Apply 1 drop to eye(s) as directed by provider Daily., Disp: , Rfl:     Allergies:   Allergies   Allergen Reactions    Ace Inhibitors Cough    Chlorhexidine Rash     Likely to be chloroprep, not necessarily chlorhexidine       Objective     Physical Exam: Please see above  Vital Signs:   Vitals:    02/24/25 1345   BP: 111/71   BP Location: Left arm   Patient Position: Sitting   Cuff Size: Adult   Pulse: 81   Temp: 97.8 °F (36.6 °C)   TempSrc: Temporal   SpO2: 95%   Weight: 86.2 kg (190 lb)   Height: 172.7 cm (68\")   PainSc: 0-No pain     Body mass index is 28.89 kg/m².          Physical Exam  Constitutional:       General: He is not in acute distress.     Appearance: Normal appearance. He is not ill-appearing.      Comments: Pleasant, healthy, well-spoken, BMI 28.8 reflecting 3 pound weight loss over the last 3 months   HENT:      Ears:      Comments: Left pinna along the mid anterior antihelix reveals a crusty skin colored lesion without ulceration or scabbing measuring approximately 0.5 cm, suspicious for an actinic keratosis  Cardiovascular:      Rate and Rhythm: Normal rate and regular rhythm.      Heart sounds: Normal heart " sounds. No murmur heard.     No friction rub. No gallop.   Pulmonary:      Effort: Pulmonary effort is normal. No respiratory distress.      Breath sounds: Normal breath sounds.   Musculoskeletal:         General: Tenderness present. No swelling, deformity or signs of injury.      Right lower leg: No edema.      Left lower leg: No edema.      Comments: Mild left greater than right SI tenderness palpation, negative straight leg raise bilaterally in the seated position   Neurological:      General: No focal deficit present.      Mental Status: He is alert and oriented to person, place, and time. Mental status is at baseline.      Cranial Nerves: No cranial nerve deficit.      Sensory: No sensory deficit.      Motor: No weakness.      Coordination: Coordination normal.      Gait: Gait normal.      Deep Tendon Reflexes: Reflexes abnormal.      Comments: Difficult to elicit left knee jerk, 2+ right knee jerk, 2+ bilateral ankle jerks   Psychiatric:         Mood and Affect: Mood normal.         Behavior: Behavior normal.         Thought Content: Thought content normal.         Judgment: Judgment normal.         Procedures    Results:   Labs:   Hemoglobin A1C   Date Value Ref Range Status   08/15/2024 5.7 (H) 4.8 - 5.6 % Final     Comment:              Prediabetes: 5.7 - 6.4           Diabetes: >6.4           Glycemic control for adults with diabetes: <7.0     05/19/2023 5.6 % Final   11/04/2021 5.4 4.3 - 5.6 % Final     Comment:     (note)  A1C% Reference Range:  4.3 - 5.6  Normal range  5.7 - 6.4  Pre-diabetic -increased risk for developing diabetes  mellitus.  >=6.5      Diabetic -diagnostic of diabetes mellitus.    Note: For diagnosis of diabetes in individuals without unequivocal  hyperglycemia, results should be confirmed by repeat testing.  Patients with conditions that shorten erythrocyte survival, such as  recovery from acute blood loss, hemolytic anemia, kidney disease,  or the presence of unstable hemogloblins  like HbSS, HbCC, and HbSC  may yield falsely decreased HbA1c test results. Iron deficiency may  yield falsely increased HbA1c test results.     TSH   Date Value Ref Range Status   11/22/2024 2.190 0.450 - 4.500 uIU/mL Final   10/30/2020 1.460 0.470 - 4.680 u(iU)/mL Final     Comment:     (note)  Higher dose biotin supplements may interfere with this test.  Interpret results within the context of patient's clinical picture.        POCT Results (if applicable):   Results for orders placed or performed in visit on 11/22/24   Folate    Collection Time: 11/22/24 12:36 PM    Specimen: Arm, Left; Blood    Blood  Release to bruno   Result Value Ref Range    Folate 7.6 >3.0 ng/mL   Vitamin B12    Collection Time: 11/22/24 12:36 PM    Specimen: Arm, Left; Blood    Blood  Release to bruno   Result Value Ref Range    Vitamin B-12 329 232 - 1,245 pg/mL   Comprehensive Metabolic Panel    Collection Time: 11/22/24 12:36 PM    Specimen: Arm, Left; Blood    Blood  Release to bruno   Result Value Ref Range    Glucose 90 70 - 99 mg/dL    BUN 13 8 - 27 mg/dL    Creatinine 1.13 0.76 - 1.27 mg/dL    EGFR Result 67 >59 mL/min/1.73    BUN/Creatinine Ratio 12 10 - 24    Sodium 141 134 - 144 mmol/L    Potassium 4.6 3.5 - 5.2 mmol/L    Chloride 104 96 - 106 mmol/L    Total CO2 25 20 - 29 mmol/L    Calcium 9.5 8.6 - 10.2 mg/dL    Total Protein 6.8 6.0 - 8.5 g/dL    Albumin 4.2 3.8 - 4.8 g/dL    Globulin 2.6 1.5 - 4.5 g/dL    Total Bilirubin 0.6 0.0 - 1.2 mg/dL    Alkaline Phosphatase 127 (H) 44 - 121 IU/L    AST (SGOT) 17 0 - 40 IU/L    ALT (SGPT) 17 0 - 44 IU/L   TSH Rfx On Abnormal To Free T4    Collection Time: 11/22/24 12:36 PM    Specimen: Arm, Left; Blood    Blood  Release to bruno   Result Value Ref Range    TSH 2.190 0.450 - 4.500 uIU/mL   CBC & Differential    Collection Time: 11/22/24 12:36 PM    Specimen: Arm, Left; Blood    Blood  Release to bruno   Result Value Ref Range    WBC 5.0 3.4 - 10.8 x10E3/uL    RBC 4.83 4.14 - 5.80 x10E6/uL     Hemoglobin 14.8 13.0 - 17.7 g/dL    Hematocrit 45.2 37.5 - 51.0 %    MCV 94 79 - 97 fL    MCH 30.6 26.6 - 33.0 pg    MCHC 32.7 31.5 - 35.7 g/dL    RDW 12.9 11.6 - 15.4 %    Platelets 219 150 - 450 x10E3/uL    Neutrophil Rel % 62 Not Estab. %    Lymphocyte Rel % 22 Not Estab. %    Monocyte Rel % 10 Not Estab. %    Eosinophil Rel % 5 Not Estab. %    Basophil Rel % 1 Not Estab. %    Neutrophils Absolute 3.1 1.4 - 7.0 x10E3/uL    Lymphocytes Absolute 1.1 0.7 - 3.1 x10E3/uL    Monocytes Absolute 0.5 0.1 - 0.9 x10E3/uL    Eosinophils Absolute 0.2 0.0 - 0.4 x10E3/uL    Basophils Absolute 0.0 0.0 - 0.2 x10E3/uL    Immature Granulocyte Rel % 0 Not Estab. %    Immature Grans Absolute 0.0 0.0 - 0.1 x10E3/uL   Vitamin B1, Whole Blood    Collection Time: 11/22/24 12:38 PM    Specimen: Blood    Blood  Release to bruno   Result Value Ref Range    Vitamin B1, Whole Blood 122.2 66.5 - 200.0 nmol/L         Assessment / Plan      Assessment/Plan:   Diagnoses and all orders for this visit:    1. Chronic bilateral low back pain with left-sided sciatica (Primary)  Assessment & Plan:  Continues with chronic lumbago with intermittent left-sided sciatica.  Continues to be clinically stable with some variation.  Overall maintaining good pain control taking Norco 7.5 mg twice daily and amitriptyline 25 mg nightly which does seem to benefit his symptoms.  Continue current regimen.  [place] screen appropriate, UDS up-to-date.  Does not require refill today.  Follow-up in 3 months for further review per standard protocol      2. Benign hypertension  Assessment & Plan:  Continues to maintain blood pressure control acutely as well as chronically by history taking amlodipine 5 mg daily and losartan 100 mg daily.  Continue current regimen with monitoring.      3. Skin lesion of left external ear  Assessment & Plan:  2 months history of a scaly skin lesion on the antihelix of the left mid external ear.  Clinically most suspicious for an actinic  keratosis.  Patient already scheduled to follow-up with dermatology next month          Follow Up:   Return in about 3 months (around 5/24/2025) for Recheck.      At [facility], we believe that sharing information builds trust and better relationships. You are receiving this note because you recently visited [facility]. It is possible you will see health information before a provider has talked with you about it. This kind of information can be easy to misunderstand. To help you fully understand what it means for your health, we urge you to discuss this note with your provider.    Jf Lassiter MD  [facility]

## 2025-02-24 NOTE — ASSESSMENT & PLAN NOTE
2 months history of a scaly skin lesion on the antihelix of the left mid external ear.  Clinically most suspicious for an actinic keratosis.  Patient already scheduled to follow-up with dermatology next month

## 2025-02-24 NOTE — ASSESSMENT & PLAN NOTE
Continues to maintain blood pressure control acutely as well as chronically by history taking amlodipine 5 mg daily and losartan 100 mg daily.  Continue current regimen with monitoring.

## 2025-03-11 DIAGNOSIS — M54.42 CHRONIC BILATERAL LOW BACK PAIN WITH BILATERAL SCIATICA: ICD-10-CM

## 2025-03-11 DIAGNOSIS — G89.29 CHRONIC BILATERAL LOW BACK PAIN WITH BILATERAL SCIATICA: ICD-10-CM

## 2025-03-11 DIAGNOSIS — Z79.891 CHRONICALLY ON OPIATE THERAPY: ICD-10-CM

## 2025-03-11 DIAGNOSIS — M54.41 CHRONIC BILATERAL LOW BACK PAIN WITH BILATERAL SCIATICA: ICD-10-CM

## 2025-03-11 RX ORDER — HYDROCODONE BITARTRATE AND ACETAMINOPHEN 5; 325 MG/1; MG/1
TABLET ORAL
Qty: 90 TABLET | Refills: 0 | Status: SHIPPED | OUTPATIENT
Start: 2025-03-11

## 2025-03-11 NOTE — TELEPHONE ENCOUNTER
Caller: Leslie Kwon    Relationship: Emergency Contact    Best call back number: [phone]     Requested Prescriptions:   Requested Prescriptions     Pending Prescriptions Disp Refills    HYDROcodone-acetaminophen (Norco) 5-325 MG per tablet 90 tablet 0     Si.5 tablets twice daily as needed for pain        Pharmacy where request should be sent: [facility] #[id number] [address]  AT [facility] [phone] [facility] [phone] FX     Last office visit with prescribing clinician: 2025   Last telemedicine visit with prescribing clinician: Visit date not found   Next office visit with prescribing clinician: 2025       Sanaz Lama Rep   25 15:05 EDT

## 2025-04-11 DIAGNOSIS — M54.42 CHRONIC BILATERAL LOW BACK PAIN WITH BILATERAL SCIATICA: ICD-10-CM

## 2025-04-11 DIAGNOSIS — M54.41 CHRONIC BILATERAL LOW BACK PAIN WITH BILATERAL SCIATICA: ICD-10-CM

## 2025-04-11 DIAGNOSIS — Z79.891 CHRONICALLY ON OPIATE THERAPY: ICD-10-CM

## 2025-04-11 DIAGNOSIS — G89.29 CHRONIC BILATERAL LOW BACK PAIN WITH BILATERAL SCIATICA: ICD-10-CM

## 2025-04-11 RX ORDER — HYDROCODONE BITARTRATE AND ACETAMINOPHEN 5; 325 MG/1; MG/1
TABLET ORAL
Qty: 90 TABLET | Refills: 0 | Status: SHIPPED | OUTPATIENT
Start: 2025-04-11

## 2025-04-11 NOTE — TELEPHONE ENCOUNTER
Caller: Leslie Kwon    Relationship: Emergency Contact    Best call back number: [phone]     Requested Prescriptions:   Requested Prescriptions     Pending Prescriptions Disp Refills    HYDROcodone-acetaminophen (Norco) 5-325 MG per tablet 90 tablet 0     Si.5 tablets twice daily as needed for pain        [facility] be [id number] [address] [place] [address]  AT [facility] [phone] [facility] [phone] FX     Last office visit with prescribing clinician: 2025   Last telemedicine visit with prescribing clinician: Visit date not found   Next office visit with prescribing clinician: 2025     Additional details provided by patient: PT IS OUT OF MEDICATION.    Does the patient have less than a 3 day supply:  [x] Yes  [] No    Would you like a call back once the refill request has been completed: [] Yes [x] No    If the office needs to give you a call back, can they leave a voicemail: [] Yes [x] No    Sanaz Tapia Rep   25 10:31 EDT

## 2025-05-09 DIAGNOSIS — M54.42 CHRONIC BILATERAL LOW BACK PAIN WITH BILATERAL SCIATICA: ICD-10-CM

## 2025-05-09 DIAGNOSIS — G89.29 CHRONIC BILATERAL LOW BACK PAIN WITH BILATERAL SCIATICA: ICD-10-CM

## 2025-05-09 DIAGNOSIS — Z79.891 CHRONICALLY ON OPIATE THERAPY: ICD-10-CM

## 2025-05-09 DIAGNOSIS — M54.41 CHRONIC BILATERAL LOW BACK PAIN WITH BILATERAL SCIATICA: ICD-10-CM

## 2025-05-09 RX ORDER — HYDROCODONE BITARTRATE AND ACETAMINOPHEN 5; 325 MG/1; MG/1
TABLET ORAL
Qty: 90 TABLET | Refills: 0 | Status: SHIPPED | OUTPATIENT
Start: 2025-05-09

## 2025-05-09 NOTE — TELEPHONE ENCOUNTER
Caller: Leslie Kwon    Relationship: Emergency Contact    Best call back number: [phone]    Requested Prescriptions:   Requested Prescriptions     Pending Prescriptions Disp Refills    HYDROcodone-acetaminophen (Norco) 5-325 MG per tablet 90 tablet 0     Si.5 tablets twice daily as needed for pain        Pharmacy where request should be sent:    [facility] [phone]  Last office visit with prescribing clinician: 2025   Last telemedicine visit with prescribing clinician: Visit date not found   Next office visit with prescribing clinician: 2025     Additional details provided by patient: PATIENT HAS 1 DAY LEFT OF MEDICATION    Does the patient have less than a 3 day supply:  [x] Yes  [] No    Would you like a call back once the refill request has been completed: [] Yes [x] No    If the office needs to give you a call back, can they leave a voicemail: [] Yes [x] No    Sanaz Plata Rep   25 09:30 EDT

## 2025-05-30 ENCOUNTER — OFFICE VISIT (OUTPATIENT)
Dept: FAMILY MEDICINE CLINIC | Facility: CLINIC | Age: 75
End: 2025-05-30
Payer: MEDICARE

## 2025-05-30 VITALS
TEMPERATURE: 97.9 F | HEIGHT: 68 IN | SYSTOLIC BLOOD PRESSURE: 118 MMHG | RESPIRATION RATE: 18 BRPM | BODY MASS INDEX: 28.49 KG/M2 | WEIGHT: 188 LBS | OXYGEN SATURATION: 98 % | DIASTOLIC BLOOD PRESSURE: 78 MMHG | HEART RATE: 87 BPM

## 2025-05-30 DIAGNOSIS — M54.42 CHRONIC BILATERAL LOW BACK PAIN WITH LEFT-SIDED SCIATICA: Primary | ICD-10-CM

## 2025-05-30 DIAGNOSIS — J45.21 MILD INTERMITTENT ASTHMA WITH ACUTE EXACERBATION: ICD-10-CM

## 2025-05-30 DIAGNOSIS — Z79.891 CHRONIC PRESCRIPTION OPIATE USE: ICD-10-CM

## 2025-05-30 DIAGNOSIS — G89.29 CHRONIC BILATERAL LOW BACK PAIN WITH LEFT-SIDED SCIATICA: Primary | ICD-10-CM

## 2025-05-30 DIAGNOSIS — I10 BENIGN HYPERTENSION: ICD-10-CM

## 2025-05-30 RX ORDER — PREDNISONE 20 MG/1
20 TABLET ORAL 2 TIMES DAILY
Qty: 10 TABLET | Refills: 0 | Status: SHIPPED | OUTPATIENT
Start: 2025-05-30 | End: 2025-06-04

## 2025-05-30 NOTE — ASSESSMENT & PLAN NOTE
Chronic prescribed Norco 5 mg averaging 1.5 tablets twice weekly with some variation in his dosing regimen depending on severity of this pain.  UDS current from 8/2024, controlled substance agreement updated today.

## 2025-05-30 NOTE — ASSESSMENT & PLAN NOTE
Routine 3-month follow-up for his chronic lumbago with intermittent left-sided sciatica.  Continues to be clinically stable with some variation in pain, noting more prominently some left lower extremity sciatica involving his thigh.  In general he has received good pain control taking Norco 5 mg tablets taking 1.5 mg twice daily with some variation in dosing depending on severity of his pain, and amitriptyline 25 mg nightly which does seem to benefit his symptoms.  Continue current regimen.  [place] screen appropriate, UDS up-to-date from 8/2024, controlled substance agreement updated today.  Does not require refill today.  Follow-up in 3 months for further review per standard protocol

## 2025-05-30 NOTE — PROGRESS NOTES
Answers submitted by the patient for this visit:  Problem not listed (Submitted on 2025)  Chief Complaint: Other medical problem  anorexia: No  joint pain: No  change in stool: No  headaches: No  joint swelling: No  vertigo: No  visual change: No  Onset: 1 to 6 months  Chronicity: new  Frequency: daily      Follow Up Office Visit      Date: 2025   Patient Name: Larry Kwon  : 1950   MRN: [id number]     Chief Complaint:    Chief Complaint   Patient presents with    Med Refill       History of Present Illness: Larry Kwon is a 75 y.o. male who is here today for routine 3-month follow-up for his chronic back pain with left-sided sciatica for which he takes Norco 5 mg typically 1.5 tablets twice daily with some flexibility in dosing, overall indicating he does have an improved quality life with the medication with no significant side effects.  He typically will walk several times weekly, noting intermittently having the left sciatic nerve pain on his left thigh.  He also has hypertension taking amlodipine and losartan with blood pressures averaging 120s over 70s checked twice weekly, with no chest pains palpitations dyspnea dizziness or edema.  He has noted a cough for the last 2 months, assuming this is related to cat allergy, with some wheeze but no dyspnea, no significant nasal symptoms.  Does have an old albuterol inhaler which she is not using.  He also relates dentist having noted a concerning lesion on the left side of his tongue during recent office visit, having been referred to oral surgery where he underwent a biopsy yesterday, indicating was quite painful but otherwise no related concerns at this time, pathology pending.  No other acute concerns at this time.    Subjective      Review of Systems:   Review of Systems    I have reviewed the patients family history, social history, past medical history, past surgical history and have updated it as appropriate.     Medications:     Current  "Outpatient Medications:     allopurinol (ZYLOPRIM) 100 MG tablet, Take 1 tablet by mouth Daily., Disp: 90 tablet, Rfl: 3    amitriptyline (ELAVIL) 25 MG tablet, Take 1 tablet by mouth Every Night., Disp: 90 tablet, Rfl: 2    amLODIPine (NORVASC) 5 MG tablet, Take 1 tablet by mouth Daily., Disp: 90 tablet, Rfl: 3    atorvastatin (LIPITOR) 20 MG tablet, Take 1 tablet by mouth Daily., Disp: 90 tablet, Rfl: 3    Cholecalciferol (Vitamin D) 50 MCG (2000 UT) tablet, Take 1 tablet by mouth Daily., Disp: 90 tablet, Rfl: 3    HYDROcodone-acetaminophen (Norco) 5-325 MG per tablet, 1.5 tablets twice daily as needed for pain, Disp: 90 tablet, Rfl: 0    losartan (COZAAR) 100 MG tablet, Take 1 tablet by mouth Daily., Disp: 90 tablet, Rfl: 3    montelukast (SINGULAIR) 10 MG tablet, Take 1 tablet by mouth Every Night., Disp: 90 tablet, Rfl: 3    tamsulosin (FLOMAX) 0.4 MG capsule 24 hr capsule, Take 1 capsule by mouth Daily., Disp: 90 capsule, Rfl: 3    travoprost, BAK free, (TRAVATAN) 0.004 % solution ophthalmic solution, Apply 1 drop to eye(s) as directed by provider Daily., Disp: , Rfl:     predniSONE (DELTASONE) 20 MG tablet, Take 1 tablet by mouth 2 (Two) Times a Day for 5 days., Disp: 10 tablet, Rfl: 0    Allergies:   Allergies   Allergen Reactions    Ace Inhibitors Cough    Chlorhexidine Rash     Likely to be chloroprep, not necessarily chlorhexidine       Objective     Physical Exam: Please see above  Vital Signs:   Vitals:    05/30/25 1303   BP: 118/78   BP Location: Right arm   Patient Position: Sitting   Cuff Size: Adult   Pulse: 87   Resp: 18   Temp: 97.9 °F (36.6 °C)   TempSrc: Temporal   SpO2: 98%   Weight: 85.3 kg (188 lb)   Height: 172.7 cm (68\")     Body mass index is 28.59 kg/m².          Physical Exam  Constitutional:       General: He is not in acute distress.     Appearance: Normal appearance. He is not ill-appearing.      Comments: Always a very pleasant healthy-appearing 75-year-old male, NAD, BMI 28.5, " weight down 2 pounds in the last 3 months   HENT:      Mouth/Throat:      Mouth: Mucous membranes are moist.      Pharynx: Oropharynx is clear. No oropharyngeal exudate or posterior oropharyngeal erythema.      Comments: Well-healed 1 cm horizontally oriented recent biopsy site on the left lateral tongue with sutures in place  Cardiovascular:      Rate and Rhythm: Regular rhythm.      Heart sounds: Normal heart sounds. No murmur heard.     No friction rub. No gallop.   Pulmonary:      Effort: Pulmonary effort is normal. No respiratory distress.      Breath sounds: No stridor. Wheezing present. No rhonchi or rales.   Chest:      Chest wall: No tenderness.   Musculoskeletal:         General: No swelling, tenderness, deformity or signs of injury.      Cervical back: No rigidity or tenderness.      Right lower leg: No edema.      Left lower leg: No edema.   Lymphadenopathy:      Cervical: No cervical adenopathy.   Neurological:      Mental Status: He is alert and oriented to person, place, and time. Mental status is at baseline.      Sensory: No sensory deficit.      Motor: No weakness.      Coordination: Coordination normal.      Gait: Gait normal.      Deep Tendon Reflexes: Reflexes abnormal.      Comments: Absent left knee reflex, 2+ right knee flexion, 1-2+ bilateral ankle reflexes with normal sensation and strength in his lower extremity         Procedures    Results:   Labs:   Hemoglobin A1C   Date Value Ref Range Status   08/15/2024 5.7 (H) 4.8 - 5.6 % Final     Comment:              Prediabetes: 5.7 - 6.4           Diabetes: >6.4           Glycemic control for adults with diabetes: <7.0     05/19/2023 5.6 % Final   11/04/2021 5.4 4.3 - 5.6 % Final     Comment:     (note)  A1C% Reference Range:  4.3 - 5.6  Normal range  5.7 - 6.4  Pre-diabetic -increased risk for developing diabetes  mellitus.  >=6.5      Diabetic -diagnostic of diabetes mellitus.    Note: For diagnosis of diabetes in individuals without  unequivocal  hyperglycemia, results should be confirmed by repeat testing.  Patients with conditions that shorten erythrocyte survival, such as  recovery from acute blood loss, hemolytic anemia, kidney disease,  or the presence of unstable hemogloblins like HbSS, HbCC, and HbSC  may yield falsely decreased HbA1c test results. Iron deficiency may  yield falsely increased HbA1c test results.     TSH   Date Value Ref Range Status   11/22/2024 2.190 0.450 - 4.500 uIU/mL Final   10/30/2020 1.460 0.470 - 4.680 u(iU)/mL Final     Comment:     (note)  Higher dose biotin supplements may interfere with this test.  Interpret results within the context of patient's clinical picture.        POCT Results (if applicable):   Results for orders placed or performed in visit on 11/22/24   Folate    Collection Time: 11/22/24 12:36 PM    Specimen: Arm, Left; Blood    Blood  Release to bruno   Result Value Ref Range    Folate 7.6 >3.0 ng/mL   Vitamin B12    Collection Time: 11/22/24 12:36 PM    Specimen: Arm, Left; Blood    Blood  Release to bruno   Result Value Ref Range    Vitamin B-12 329 232 - 1,245 pg/mL   Comprehensive Metabolic Panel    Collection Time: 11/22/24 12:36 PM    Specimen: Arm, Left; Blood    Blood  Release to bruno   Result Value Ref Range    Glucose 90 70 - 99 mg/dL    BUN 13 8 - 27 mg/dL    Creatinine 1.13 0.76 - 1.27 mg/dL    EGFR Result 67 >59 mL/min/1.73    BUN/Creatinine Ratio 12 10 - 24    Sodium 141 134 - 144 mmol/L    Potassium 4.6 3.5 - 5.2 mmol/L    Chloride 104 96 - 106 mmol/L    Total CO2 25 20 - 29 mmol/L    Calcium 9.5 8.6 - 10.2 mg/dL    Total Protein 6.8 6.0 - 8.5 g/dL    Albumin 4.2 3.8 - 4.8 g/dL    Globulin 2.6 1.5 - 4.5 g/dL    Total Bilirubin 0.6 0.0 - 1.2 mg/dL    Alkaline Phosphatase 127 (H) 44 - 121 IU/L    AST (SGOT) 17 0 - 40 IU/L    ALT (SGPT) 17 0 - 44 IU/L   TSH Rfx On Abnormal To Free T4    Collection Time: 11/22/24 12:36 PM    Specimen: Arm, Left; Blood    Blood  Release to bruno   Result  Value Ref Range    TSH 2.190 0.450 - 4.500 uIU/mL   CBC & Differential    Collection Time: 11/22/24 12:36 PM    Specimen: Arm, Left; Blood    Blood  Release to bruno   Result Value Ref Range    WBC 5.0 3.4 - 10.8 x10E3/uL    RBC 4.83 4.14 - 5.80 x10E6/uL    Hemoglobin 14.8 13.0 - 17.7 g/dL    Hematocrit 45.2 37.5 - 51.0 %    MCV 94 79 - 97 fL    MCH 30.6 26.6 - 33.0 pg    MCHC 32.7 31.5 - 35.7 g/dL    RDW 12.9 11.6 - 15.4 %    Platelets 219 150 - 450 x10E3/uL    Neutrophil Rel % 62 Not Estab. %    Lymphocyte Rel % 22 Not Estab. %    Monocyte Rel % 10 Not Estab. %    Eosinophil Rel % 5 Not Estab. %    Basophil Rel % 1 Not Estab. %    Neutrophils Absolute 3.1 1.4 - 7.0 x10E3/uL    Lymphocytes Absolute 1.1 0.7 - 3.1 x10E3/uL    Monocytes Absolute 0.5 0.1 - 0.9 x10E3/uL    Eosinophils Absolute 0.2 0.0 - 0.4 x10E3/uL    Basophils Absolute 0.0 0.0 - 0.2 x10E3/uL    Immature Granulocyte Rel % 0 Not Estab. %    Immature Grans Absolute 0.0 0.0 - 0.1 x10E3/uL   Vitamin B1, Whole Blood    Collection Time: 11/22/24 12:38 PM    Specimen: Blood    Blood  Release to bruno   Result Value Ref Range    Vitamin B1, Whole Blood 122.2 66.5 - 200.0 nmol/L         Assessment / Plan      Assessment/Plan:   Diagnoses and all orders for this visit:    1. Chronic bilateral low back pain with left-sided sciatica (Primary)  Assessment & Plan:  Routine 3-month follow-up for his chronic lumbago with intermittent left-sided sciatica.  Continues to be clinically stable with some variation in pain, noting more prominently some left lower extremity sciatica involving his thigh.  In general he has received good pain control taking Norco 5 mg tablets taking 1.5 mg twice daily with some variation in dosing depending on severity of his pain, and amitriptyline 25 mg nightly which does seem to benefit his symptoms.  Continue current regimen.  [place] screen appropriate, UDS up-to-date from 8/2024, controlled substance agreement updated today.  Does not  require refill today.  Follow-up in 3 months for further review per standard protocol      2. Chronic prescription opiate use  Assessment & Plan:  Chronic prescribed Norco 5 mg averaging 1.5 tablets twice weekly with some variation in his dosing regimen depending on severity of this pain.  UDS current from 8/2024, controlled substance agreement updated today.      3. Benign hypertension  Assessment & Plan:  Maintaining very satisfactory blood pressure control acutely as well as chronically by history taking amlodipine 5 mg daily and losartan 100 mg daily.  Continue current regimen with monitoring.      4. Mild intermittent asthma with acute exacerbation  Assessment & Plan:  Chronic cough with some auscultated wheeze here today.  Very unlikely a respiratory infection given duration of symptoms or lack of any signs of illness.  Suspect related to cat allergy.  Use previously prescribed albuterol inhaler with spacer, proper dosing technique demonstrated, and also prescribe brief course of Prelone.  Advised if symptoms not resolving with treatment, or for any recurrence at which point we will then need to consider asthma suppressive therapy.            Orders:  -     predniSONE (DELTASONE) 20 MG tablet; Take 1 tablet by mouth 2 (Two) Times a Day for 5 days.  Dispense: 10 tablet; Refill: 0        Vaccine Counseling:      Follow Up:   Return for Next scheduled follow up.      At [facility], we believe that sharing information builds trust and better relationships. You are receiving this note because you recently visited [facility]. It is possible you will see health information before a provider has talked with you about it. This kind of information can be easy to misunderstand. To help you fully understand what it means for your health, we urge you to discuss this note with your provider.    Jf Lassiter MD  [facility] [place]

## 2025-05-30 NOTE — ASSESSMENT & PLAN NOTE
Chronic cough with some auscultated wheeze here today.  Very unlikely a respiratory infection given duration of symptoms or lack of any signs of illness.  Suspect related to cat allergy.  Use previously prescribed albuterol inhaler with spacer, proper dosing technique demonstrated, and also prescribe brief course of Prelone.  Advised if symptoms not resolving with treatment, or for any recurrence at which point we will then need to consider asthma suppressive therapy.

## 2025-05-30 NOTE — ASSESSMENT & PLAN NOTE
Maintaining very satisfactory blood pressure control acutely as well as chronically by history taking amlodipine 5 mg daily and losartan 100 mg daily.  Continue current regimen with monitoring.

## 2025-06-06 DIAGNOSIS — M54.42 CHRONIC BILATERAL LOW BACK PAIN WITH BILATERAL SCIATICA: ICD-10-CM

## 2025-06-06 DIAGNOSIS — Z79.891 CHRONICALLY ON OPIATE THERAPY: ICD-10-CM

## 2025-06-06 DIAGNOSIS — M54.41 CHRONIC BILATERAL LOW BACK PAIN WITH BILATERAL SCIATICA: ICD-10-CM

## 2025-06-06 DIAGNOSIS — G89.29 CHRONIC BILATERAL LOW BACK PAIN WITH BILATERAL SCIATICA: ICD-10-CM

## 2025-06-06 RX ORDER — HYDROCODONE BITARTRATE AND ACETAMINOPHEN 5; 325 MG/1; MG/1
TABLET ORAL
Qty: 90 TABLET | Refills: 0 | Status: SHIPPED | OUTPATIENT
Start: 2025-06-06

## 2025-06-06 RX ORDER — AMLODIPINE BESYLATE 5 MG/1
5 TABLET ORAL DAILY
Qty: 90 TABLET | Refills: 3 | Status: SHIPPED | OUTPATIENT
Start: 2025-06-06

## 2025-06-06 RX ORDER — LOSARTAN POTASSIUM 100 MG/1
100 TABLET ORAL DAILY
Qty: 90 TABLET | Refills: 3 | Status: SHIPPED | OUTPATIENT
Start: 2025-06-06

## 2025-07-08 DIAGNOSIS — G89.29 CHRONIC BILATERAL LOW BACK PAIN WITH BILATERAL SCIATICA: ICD-10-CM

## 2025-07-08 DIAGNOSIS — M54.41 CHRONIC BILATERAL LOW BACK PAIN WITH BILATERAL SCIATICA: ICD-10-CM

## 2025-07-08 DIAGNOSIS — M54.42 CHRONIC BILATERAL LOW BACK PAIN WITH BILATERAL SCIATICA: ICD-10-CM

## 2025-07-08 DIAGNOSIS — Z79.891 CHRONICALLY ON OPIATE THERAPY: ICD-10-CM

## 2025-07-08 RX ORDER — HYDROCODONE BITARTRATE AND ACETAMINOPHEN 5; 325 MG/1; MG/1
TABLET ORAL
Qty: 90 TABLET | Refills: 0 | Status: SHIPPED | OUTPATIENT
Start: 2025-07-08

## 2025-07-16 DIAGNOSIS — M54.42 CHRONIC BILATERAL LOW BACK PAIN WITH LEFT-SIDED SCIATICA: ICD-10-CM

## 2025-07-16 DIAGNOSIS — G89.29 CHRONIC BILATERAL LOW BACK PAIN WITH LEFT-SIDED SCIATICA: ICD-10-CM

## 2025-07-16 DIAGNOSIS — R39.15 URINARY URGENCY: ICD-10-CM

## 2025-07-16 NOTE — TELEPHONE ENCOUNTER
Caller: Cher Larry W    Relationship: Self    Best call back number: [phone]     Requested Prescriptions:   Requested Prescriptions     Pending Prescriptions Disp Refills    amitriptyline (ELAVIL) 25 MG tablet 90 tablet 2     Sig: Take 1 tablet by mouth Every Night.      COMPLETELY OUT AS OF TODAY. REQUEST 90 DAYS WITH YR REFILLS PLEASE    Pharmacy where request should be sent: [id number] [address] AT [facility] [phone] [facility] [phone] FX     Last office visit with prescribing clinician: 5/30/2025   Last telemedicine visit with prescribing clinician: Visit date not found   Next office visit with prescribing clinician: 8/18/2025         Does the patient have less than a 3 day supply:  [x] Yes  [] No    Would you like a call back once the refill request has been completed: [] Yes [x] No    If the office needs to give you a call back, can they leave a voicemail: [] Yes [x] No    Sanaz Alvarenga Rep   07/16/25 09:33 EDT

## 2025-08-04 DIAGNOSIS — M54.41 CHRONIC BILATERAL LOW BACK PAIN WITH BILATERAL SCIATICA: ICD-10-CM

## 2025-08-04 DIAGNOSIS — G89.29 CHRONIC BILATERAL LOW BACK PAIN WITH BILATERAL SCIATICA: ICD-10-CM

## 2025-08-04 DIAGNOSIS — Z79.891 CHRONICALLY ON OPIATE THERAPY: ICD-10-CM

## 2025-08-04 DIAGNOSIS — M54.42 CHRONIC BILATERAL LOW BACK PAIN WITH BILATERAL SCIATICA: ICD-10-CM

## 2025-08-04 RX ORDER — HYDROCODONE BITARTRATE AND ACETAMINOPHEN 5; 325 MG/1; MG/1
TABLET ORAL
Qty: 90 TABLET | Refills: 0 | Status: SHIPPED | OUTPATIENT
Start: 2025-08-04

## 2025-08-21 ENCOUNTER — OFFICE VISIT (OUTPATIENT)
Dept: FAMILY MEDICINE CLINIC | Facility: CLINIC | Age: 75
End: 2025-08-21
Payer: MEDICARE

## 2025-08-21 VITALS
HEART RATE: 82 BPM | DIASTOLIC BLOOD PRESSURE: 78 MMHG | TEMPERATURE: 97.9 F | OXYGEN SATURATION: 96 % | WEIGHT: 187.8 LBS | HEIGHT: 68 IN | BODY MASS INDEX: 28.46 KG/M2 | SYSTOLIC BLOOD PRESSURE: 136 MMHG

## 2025-08-21 DIAGNOSIS — M54.42 CHRONIC BILATERAL LOW BACK PAIN WITH LEFT-SIDED SCIATICA: ICD-10-CM

## 2025-08-21 DIAGNOSIS — Z00.01 ENCOUNTER FOR GENERAL ADULT MEDICAL EXAMINATION WITH ABNORMAL FINDINGS: ICD-10-CM

## 2025-08-21 DIAGNOSIS — E66.3 OVERWEIGHT (BMI 25.0-29.9): ICD-10-CM

## 2025-08-21 DIAGNOSIS — M79.645 PAIN OF FINGER OF LEFT HAND: ICD-10-CM

## 2025-08-21 DIAGNOSIS — Z00.00 MEDICARE ANNUAL WELLNESS VISIT, SUBSEQUENT: Primary | ICD-10-CM

## 2025-08-21 DIAGNOSIS — Z79.891 CHRONIC PRESCRIPTION OPIATE USE: ICD-10-CM

## 2025-08-21 DIAGNOSIS — E79.0 HYPERURICEMIA: ICD-10-CM

## 2025-08-21 DIAGNOSIS — N40.1 BPH ASSOCIATED WITH NOCTURIA: ICD-10-CM

## 2025-08-21 DIAGNOSIS — R35.1 BPH ASSOCIATED WITH NOCTURIA: ICD-10-CM

## 2025-08-21 DIAGNOSIS — Z13.29 SCREENING FOR THYROID DISORDER: ICD-10-CM

## 2025-08-21 DIAGNOSIS — I45.10 RBBB: ICD-10-CM

## 2025-08-21 DIAGNOSIS — Z87.442 HISTORY OF KIDNEY STONES: ICD-10-CM

## 2025-08-21 DIAGNOSIS — E55.9 VITAMIN D DEFICIENCY: ICD-10-CM

## 2025-08-21 DIAGNOSIS — I10 BENIGN HYPERTENSION: ICD-10-CM

## 2025-08-21 DIAGNOSIS — R73.03 PREDIABETES: ICD-10-CM

## 2025-08-21 DIAGNOSIS — E78.2 MIXED HYPERLIPIDEMIA: ICD-10-CM

## 2025-08-21 DIAGNOSIS — G89.29 CHRONIC BILATERAL LOW BACK PAIN WITH LEFT-SIDED SCIATICA: ICD-10-CM

## 2025-08-21 DIAGNOSIS — Z86.39 HISTORY OF PRIMARY HYPERPARATHYROIDISM: ICD-10-CM

## 2025-08-21 DIAGNOSIS — H40.89 BILATERAL GLAUCOMA DUE TO COMBINATION OF MECHANISMS: ICD-10-CM

## 2025-08-21 LAB
EXPIRATION DATE: NORMAL
GLUCOSE BLDC GLUCOMTR-MCNC: 115 MG/DL (ref 70–130)
HBA1C MFR BLD: 5.6 % (ref 4.5–5.7)
Lab: NORMAL
POC ALBUMIN, URINE: 10 MG/L
POC CREATININE, URINE: 10 MG/DL
POC URINE ALB/CREA RATIO: <30

## 2025-08-21 RX ORDER — AMITRIPTYLINE HYDROCHLORIDE 50 MG/1
50 TABLET ORAL NIGHTLY
Qty: 90 TABLET | Refills: 3 | Status: SHIPPED | OUTPATIENT
Start: 2025-08-21

## 2025-08-21 RX ORDER — TAMSULOSIN HYDROCHLORIDE 0.4 MG/1
1 CAPSULE ORAL DAILY
Qty: 90 CAPSULE | Refills: 3 | Status: SHIPPED | OUTPATIENT
Start: 2025-08-21

## 2025-08-22 LAB
25(OH)D3+25(OH)D2 SERPL-MCNC: 31.7 NG/ML (ref 30–100)
ALBUMIN SERPL-MCNC: 4.4 G/DL (ref 3.8–4.8)
ALP SERPL-CCNC: 116 IU/L (ref 44–121)
ALT SERPL-CCNC: 19 IU/L (ref 0–44)
APPEARANCE UR: CLEAR
AST SERPL-CCNC: 21 IU/L (ref 0–40)
BACTERIA #/AREA URNS HPF: NORMAL /[HPF]
BILIRUB SERPL-MCNC: 1 MG/DL (ref 0–1.2)
BILIRUB UR QL STRIP: NEGATIVE
BUN SERPL-MCNC: 11 MG/DL (ref 8–27)
BUN/CREAT SERPL: 9 (ref 10–24)
CALCIUM SERPL-MCNC: 9.9 MG/DL (ref 8.6–10.2)
CASTS URNS QL MICRO: NORMAL /LPF
CHLORIDE SERPL-SCNC: 102 MMOL/L (ref 96–106)
CHOLEST SERPL-MCNC: 139 MG/DL (ref 100–199)
CO2 SERPL-SCNC: 22 MMOL/L (ref 20–29)
COLOR UR: YELLOW
CREAT SERPL-MCNC: 1.16 MG/DL (ref 0.76–1.27)
EGFRCR SERPLBLD CKD-EPI 2021: 65 ML/MIN/1.73
EPI CELLS #/AREA URNS HPF: NORMAL /HPF (ref 0–10)
GLOBULIN SER CALC-MCNC: 2.7 G/DL (ref 1.5–4.5)
GLUCOSE SERPL-MCNC: 83 MG/DL (ref 70–99)
GLUCOSE UR QL STRIP: NEGATIVE
HDLC SERPL-MCNC: 59 MG/DL
HGB UR QL STRIP: NEGATIVE
KETONES UR QL STRIP: NEGATIVE
LDLC SERPL CALC-MCNC: 63 MG/DL (ref 0–99)
LEUKOCYTE ESTERASE UR QL STRIP: NEGATIVE
MICRO URNS: NORMAL
MICRO URNS: NORMAL
NITRITE UR QL STRIP: NEGATIVE
PH UR STRIP: 6.5 [PH] (ref 5–7.5)
POTASSIUM SERPL-SCNC: 4.4 MMOL/L (ref 3.5–5.2)
PROT SERPL-MCNC: 7.1 G/DL (ref 6–8.5)
PROT UR QL STRIP: NEGATIVE
RBC #/AREA URNS HPF: NORMAL /HPF (ref 0–2)
SODIUM SERPL-SCNC: 142 MMOL/L (ref 134–144)
SP GR UR STRIP: 1.01 (ref 1–1.03)
TRIGL SERPL-MCNC: 93 MG/DL (ref 0–149)
TSH SERPL DL<=0.005 MIU/L-ACNC: 2.32 UIU/ML (ref 0.45–4.5)
URATE SERPL-MCNC: 4.6 MG/DL (ref 3.8–8.4)
URINALYSIS REFLEX: NORMAL
UROBILINOGEN UR STRIP-MCNC: 0.2 MG/DL (ref 0.2–1)
VLDLC SERPL CALC-MCNC: 17 MG/DL (ref 5–40)
WBC #/AREA URNS HPF: NORMAL /HPF (ref 0–5)

## 2025-08-23 LAB
AMPHETAMINES UR QL SCN: NEGATIVE NG/ML
BARBITURATES UR QL SCN: NEGATIVE NG/ML
BENZODIAZ UR QL SCN: NEGATIVE NG/ML
BZE UR QL SCN: NEGATIVE NG/ML
CANNABINOIDS UR QL SCN: NEGATIVE NG/ML
CREAT UR-MCNC: 33.9 MG/DL (ref 20–300)
LABORATORY COMMENT REPORT: ABNORMAL
METHADONE UR QL SCN: NEGATIVE NG/ML
OPIATES UR QL SCN: POSITIVE NG/ML
OXYCODONE+OXYMORPHONE UR QL SCN: NEGATIVE NG/ML
PCP UR QL: NEGATIVE NG/ML
PH UR: 5.9 [PH] (ref 4.5–8.9)
PROPOXYPH UR QL SCN: NEGATIVE NG/ML

## 2025-08-27 RX ORDER — MONTELUKAST SODIUM 10 MG/1
10 TABLET ORAL NIGHTLY
Qty: 90 TABLET | Refills: 3 | Status: SHIPPED | OUTPATIENT
Start: 2025-08-27

## 2025-08-27 RX ORDER — ALLOPURINOL 100 MG/1
100 TABLET ORAL DAILY
Qty: 90 TABLET | Refills: 3 | Status: SHIPPED | OUTPATIENT
Start: 2025-08-27

## (undated) DEVICE — SINGLE-USE BIOPSY FORCEPS: Brand: RADIAL JAW 4

## (undated) DEVICE — SNAR POLYP SENSATION STDOVL 27 240 BX40

## (undated) DEVICE — Device

## (undated) DEVICE — CANN O2 ETCO2 FITS ALL CONN CO2 SMPL A/ 7IN DISP LF

## (undated) DEVICE — SENSR O2 OXIMAX FNGR A/ 18IN NONSTR

## (undated) DEVICE — ADAPT CLN BIOGUARD AIR/H2O DISP

## (undated) DEVICE — SUT VIC 3/0 CT2 27IN J232H

## (undated) DEVICE — KT ORCA ORCAPOD DISP STRL

## (undated) DEVICE — PATIENT RETURN ELECTRODE, SINGLE-USE, CONTACT QUALITY MONITORING, ADULT, WITH 9FT CORD, FOR PATIENTS WEIGING OVER 33LBS. (15KG): Brand: MEGADYNE

## (undated) DEVICE — PENCL ES MEGADINE EZ/CLEAN BUTN W/HOLSTR 10FT

## (undated) DEVICE — CVR PROB GEN PURP W ISOSILK 6X48

## (undated) DEVICE — STPLR SKIN VISISTAT WD 35CT

## (undated) DEVICE — SUT VIC 2/0 TIES 18IN J111T

## (undated) DEVICE — ELECTRD BLD EZ CLN MOD XLNG 2.75IN

## (undated) DEVICE — APPL CHLORAPREP HI/LITE 26ML ORNG

## (undated) DEVICE — TUBING, SUCTION, 1/4" X 10', STRAIGHT: Brand: MEDLINE

## (undated) DEVICE — CVR TRANSD CIV FLX TPR 11.9 TO 3.8X61CM

## (undated) DEVICE — SPONGE,DISSECTOR,K,XRAY,9/16"X1/4",STRL: Brand: MEDLINE

## (undated) DEVICE — LN SMPL CO2 SHTRM SD STREAM W/M LUER

## (undated) DEVICE — GLV SURG BIOGEL M LTX PF 6 1/2

## (undated) DEVICE — MEDI-VAC YANKAUER SUCTION HANDLE W/BULBOUS TIP: Brand: CARDINAL HEALTH

## (undated) DEVICE — DRSNG TELFA PAD NONADH STR 1S 3X8IN

## (undated) DEVICE — THE TORRENT IRRIGATION SCOPE CONNECTOR IS USED WITH THE TORRENT IRRIGATION TUBING TO PROVIDE IRRIGATION FLUIDS SUCH AS STERILE WATER DURING GASTROINTESTINAL ENDOSCOPIC PROCEDURES WHEN USED IN CONJUNCTION WITH AN IRRIGATION PUMP (OR ELECTROSURGICAL UNIT).: Brand: TORRENT

## (undated) DEVICE — ADHS SKIN SURG TISS VISC PREMIERPRO EXOFIN HI/VISC FAST/DRY

## (undated) DEVICE — TRAP FLD MINIVAC MEGADYNE 100ML

## (undated) DEVICE — IRRIGATOR BULB ASEPTO 60CC STRL

## (undated) DEVICE — DRAPE,REIN 53X77,STERILE: Brand: MEDLINE

## (undated) DEVICE — TUBING, SUCTION, 1/4" X 20', STRAIGHT: Brand: MEDLINE INDUSTRIES, INC.

## (undated) DEVICE — LABEL SHEET CUSTOM 2X2 YELLOW: Brand: MEDLINE INDUSTRIES, INC.

## (undated) DEVICE — DRSNG TELFA PAD NONADH STR 1S 3X4IN

## (undated) DEVICE — THE SINGLE USE ETRAP – POLYP TRAP IS USED FOR SUCTION RETRIEVAL OF ENDOSCOPICALLY REMOVED POLYPS.: Brand: ETRAP

## (undated) DEVICE — PK ENT 40

## (undated) DEVICE — TOWEL,OR,DSP,ST,BLUE,STD,4/PK,20PK/CS: Brand: MEDLINE

## (undated) DEVICE — SUT VIC 3/0 TIES 18IN J110T

## (undated) DEVICE — DISPOSABLE BIPOLAR FORCEPS 4" (10.2CM) JEWELERS, STRAIGHT 0.4MM TIP AND 12 FT. (3.6M) CABLE: Brand: KIRWAN

## (undated) DEVICE — SUT VIC 5/0 PS2 18IN J495H

## (undated) DEVICE — SPONGE,LAP,4"X18",XR,ST,5/PK,40PK/CS: Brand: MEDLINE INDUSTRIES, INC.

## (undated) DEVICE — DRP SLUSH WARMR MACH CIR 44X44IN